# Patient Record
Sex: MALE | Race: BLACK OR AFRICAN AMERICAN | NOT HISPANIC OR LATINO | URBAN - METROPOLITAN AREA
[De-identification: names, ages, dates, MRNs, and addresses within clinical notes are randomized per-mention and may not be internally consistent; named-entity substitution may affect disease eponyms.]

---

## 2023-04-19 ENCOUNTER — NURSING HOME VISIT (OUTPATIENT)
Dept: POST ACUTE CARE | Facility: EXTERNAL LOCATION | Age: 63
End: 2023-04-19
Payer: MEDICAID

## 2023-04-19 DIAGNOSIS — F33.9 RECURRENT MAJOR DEPRESSIVE DISORDER, REMISSION STATUS UNSPECIFIED (CMS-HCC): ICD-10-CM

## 2023-04-19 DIAGNOSIS — E11.9 TYPE 2 DIABETES MELLITUS WITHOUT COMPLICATION, WITHOUT LONG-TERM CURRENT USE OF INSULIN (MULTI): Primary | ICD-10-CM

## 2023-04-19 DIAGNOSIS — R53.81 PHYSICAL DECONDITIONING: ICD-10-CM

## 2023-04-19 DIAGNOSIS — F20.0 CHRONIC PARANOID SCHIZOPHRENIA (MULTI): ICD-10-CM

## 2023-04-19 DIAGNOSIS — I10 PRIMARY HYPERTENSION: ICD-10-CM

## 2023-04-19 PROCEDURE — 99308 SBSQ NF CARE LOW MDM 20: CPT | Performed by: INTERNAL MEDICINE

## 2023-04-19 NOTE — LETTER
Patient: Carter Jefferson  : 1960    Encounter Date: 2023    Subjective- monthly follow up.   H/O schizophrenia, hypertension, hyperlipidemia, diabetes seen today for a routine visit .He is sitting in his bed and denies any complaints no concerns per staff.   ROS:   General-no fever   Chest-no pain   Respiratory-nocough   Abdomen-no pain or diarrhea   General: no acute distress   Pain:   Vital signs: /77   T 97.8   p 79 Weight 151.6   Gen- NAD, sitting in bed   Lungs: clear to auscultation B/L   Cardio: S1-S2 normal   ABD: Soft, nontender   Musc/Skel:No deformities   Extremities: No pedal edema   Neuro: No neurological deficits   Psych: Schizophrenia     Assessment/plan-   Hypertension- stable   c/w ASA   Hyperlipidemia-   Diabetes type 2-Hba1c 5.5   continue glyburide, Metformin   Schizophrenia-continue with fluphenazine   Follow-up with psych   Weakness- c/w supportive care      Electronically Signed By: Dianne Jewell MD   23 11:53 AM

## 2023-04-21 PROBLEM — E11.9 TYPE 2 DIABETES MELLITUS (MULTI): Status: ACTIVE | Noted: 2023-04-21

## 2023-04-21 PROBLEM — F20.0 CHRONIC PARANOID SCHIZOPHRENIA (MULTI): Status: ACTIVE | Noted: 2023-04-21

## 2023-04-21 PROBLEM — F32.A DEPRESSION: Status: ACTIVE | Noted: 2023-04-21

## 2023-04-21 PROBLEM — F32.9 MAJOR DEPRESSIVE DISORDER: Status: ACTIVE | Noted: 2023-04-21

## 2023-04-21 PROBLEM — R53.81 PHYSICAL DECONDITIONING: Status: ACTIVE | Noted: 2023-04-21

## 2023-04-21 PROBLEM — E11.40 DIABETIC NEUROPATHY (MULTI): Status: ACTIVE | Noted: 2023-04-21

## 2023-04-21 PROBLEM — I10 PRIMARY HYPERTENSION: Status: ACTIVE | Noted: 2023-04-21

## 2023-04-21 PROBLEM — F20.9 SCHIZOPHRENIA (MULTI): Status: ACTIVE | Noted: 2023-04-21

## 2023-04-21 PROBLEM — I25.10 CAD (CORONARY ARTERY DISEASE): Status: ACTIVE | Noted: 2023-04-21

## 2023-04-21 NOTE — PROGRESS NOTES
Subjective- monthly follow up.   H/O schizophrenia, hypertension, hyperlipidemia, diabetes seen today for a routine visit .He is sitting in his bed and denies any complaints no concerns per staff.   ROS:   General-no fever   Chest-no pain   Respiratory-nocough   Abdomen-no pain or diarrhea   General: no acute distress   Pain:   Vital signs: /77   T 97.8   p 79 Weight 151.6   Gen- NAD, sitting in bed   Lungs: clear to auscultation B/L   Cardio: S1-S2 normal   ABD: Soft, nontender   Musc/Skel:No deformities   Extremities: No pedal edema   Neuro: No neurological deficits   Psych: Schizophrenia     Assessment/plan-   Hypertension- stable   c/w ASA   Hyperlipidemia-   Diabetes type 2-Hba1c 5.5   continue glyburide, Metformin   Schizophrenia-continue with fluphenazine   Follow-up with psych   Weakness- c/w supportive care

## 2023-05-02 ENCOUNTER — NURSING HOME VISIT (OUTPATIENT)
Dept: POST ACUTE CARE | Facility: EXTERNAL LOCATION | Age: 63
End: 2023-05-02
Payer: MEDICAID

## 2023-05-02 DIAGNOSIS — M19.90 OSTEOARTHRITIS, UNSPECIFIED OSTEOARTHRITIS TYPE, UNSPECIFIED SITE: Primary | ICD-10-CM

## 2023-05-02 DIAGNOSIS — F20.0 CHRONIC PARANOID SCHIZOPHRENIA (MULTI): ICD-10-CM

## 2023-05-02 DIAGNOSIS — F29 PSYCHOSIS, UNSPECIFIED PSYCHOSIS TYPE (MULTI): ICD-10-CM

## 2023-05-02 DIAGNOSIS — E13.42 DIABETIC POLYNEUROPATHY ASSOCIATED WITH OTHER SPECIFIED DIABETES MELLITUS (MULTI): ICD-10-CM

## 2023-05-02 DIAGNOSIS — F33.9 RECURRENT MAJOR DEPRESSIVE DISORDER, REMISSION STATUS UNSPECIFIED (CMS-HCC): ICD-10-CM

## 2023-05-02 DIAGNOSIS — E11.9 TYPE 2 DIABETES MELLITUS WITHOUT COMPLICATION, WITHOUT LONG-TERM CURRENT USE OF INSULIN (MULTI): ICD-10-CM

## 2023-05-02 DIAGNOSIS — I25.10 CORONARY ARTERY DISEASE INVOLVING NATIVE HEART WITHOUT ANGINA PECTORIS, UNSPECIFIED VESSEL OR LESION TYPE: ICD-10-CM

## 2023-05-02 PROCEDURE — 99309 SBSQ NF CARE MODERATE MDM 30: CPT | Performed by: NURSE PRACTITIONER

## 2023-05-02 NOTE — LETTER
Patient: Carter Jefferson  : 1960    Encounter Date: 2023    Name: Carter Jefferson    YOB: 1960    Code Status: Full Code    Chief Complaint:   Chronic disease management;  osteoarthritis; etc....    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     Diagnoses as follows:      Osteoarthritis: Chronic. Stable.  On acetaminophen PRN for pain.   No complaints of pain today.  No reports of patient complaining of pain from staff.  Ambulates independently without any devices.  He does not walk around frequently.  No recent falls reported.   Patient seen today he is in bed.  Typically the patient is usually in bed.   Calm, cooperative.  Mentation at baseline.          Type 2 Diabetes; noncompliance with diagnostic testing:   On Metformin & Glyburide.  Prior HA1c results:  21 HA1c 5.6 %   10/12/21 HA1c 5.5 %   No recent HA1c available because patient consistently refuses blood work.  Discussed importance of allowing blood work to be done because it has been such a long time.  He has refused on multiple occasions, but he agrees to allow blood work to be done tomorrow morning.         CAD:  On Aspirin.   No complaints of chest pain.    Paranoid Schizophrenia;unspecified psychosis;Major Depressive Disorder:   On Fluphenazine.  He is compliant with taking his medications.  Cooperative but he is slightly irritable at  times.   He is calm and cooperative during assessment.  Mentation at baseline.  No agitation reported.   No recent weight loss reported.   Patient sleeps a lot.     Diabetic neuropathy:  Not on any medications currently for neuropathy.  No complaints of neuropathy symptoms today.              Reviewed  EMR  Reviewed medical, social, surgical and family history.  Reviewed all current medications and performed medication reconciliation.  Performed prescription drug management.  Reviewed vital signs AND lab results  Reviewed [facility] Documentation  Discussed patient with nursing.     ROS: 10 point ROS performed; Negative unless noted in HPI.      Surgical History  Problems    · No history of surgery    Family History  Mother    · No pertinent family history    Social History   · Current every day smoker   · Light cigarette smoker    · Lives in long-term care facility    · No illicit drug use   · Quit consuming alcohol in remote past    Allergies  NoKnown    · No Known Allergies      Outside Labs:  CBC: Date: 10/19/21, WBC: 11.6, Hgb: 12, Hct: 38.5, PLT: 310   CBC: Date: 10/12/21, WBC: 13.2, Hgb: 11.9, Hct: 38.2, PLT: 305   BMP: Date: 10/19/21, Na: 140, K: 4.2, Cl: 104, CO2: 26, BUN: 17, Cr: 1.0, Glu: 51, Ca: 9.7   BMP: Date: 10/12/21, Na: 140, K: 4.3, Cl: 103, CO2: 27, BUN: 17, Cr: 1.1, Glu: 80, Ca: 9.7   Hepatic Function Tests: Date: 10/12/21, AST: 12, ALT: 12, Alk Phos: 44, T Bili: 0.3, Albumin: 4.0   Hepatic Function Tests: Date: 10/19/21, AST: 12, ALT: 11, Alk Phos: 40, T Bili: 0.3, Albumin: 3.9   11/18/20 HA1 C 5.6 %    10/12/21 HA1c 5.5 %.          Lab Results   Component Value Date    WBC 17.2 (H) 11/11/2020    HGB 12.8 (L) 11/11/2020    HCT 41.9 11/11/2020     11/11/2020    ALT 12 11/11/2020    AST 14 11/11/2020     11/11/2020    K 3.7 11/11/2020     11/11/2020    CREATININE 0.96 11/11/2020    BUN 14 11/11/2020    CO2 27 11/11/2020    HGBA1C 5.6 11/11/2020             /90    "Pulse 75   Temp 36.4 °C (97.6 °F)   Resp 20   Ht 1.753 m (5' 9\")   Wt 66.1 kg (145 lb 12.8 oz)   SpO2 97%   BMI 21.53 kg/m²      Physical Exam  Vitals and nursing note reviewed.   Constitutional:       General: He is awake.      Appearance: He is underweight.      Comments: Chronically ill   HENT:      Head: Normocephalic.      Right Ear: External ear normal.      Left Ear: External ear normal.      Nose: Nose normal.      Mouth/Throat:      Mouth: Mucous membranes are moist.      Pharynx: Oropharynx is clear.   Eyes:      Extraocular Movements: Extraocular movements intact.      Conjunctiva/sclera: Conjunctivae normal.      Pupils: Pupils are equal, round, and reactive to light.   Cardiovascular:      Rate and Rhythm: Normal rate and regular rhythm.      Pulses: Normal pulses.      Heart sounds: Normal heart sounds.   Pulmonary:      Effort: Pulmonary effort is normal.      Breath sounds: Normal breath sounds.   Abdominal:      General: Bowel sounds are normal.      Palpations: Abdomen is soft.   Musculoskeletal:         General: Normal range of motion.      Cervical back: Normal range of motion and neck supple.   Skin:     General: Skin is warm and dry.   Neurological:      Mental Status: He is alert and oriented to person, place, and time. Mental status is at baseline.      Motor: Weakness present.   Psychiatric:         Mood and Affect: Mood normal. Affect is flat.         Behavior: Behavior is slowed. Behavior is not agitated or aggressive. Behavior is cooperative.         Judgment: Judgment is not inappropriate.          Assessment/Plan     Ordered CMP, CBC with diff., Ha1c, and lipid panel LABS for tomorrow.       Osteoarthritis:  Continue acetaminophen PRN for pain.   Monitor for pain.  Fall prevention strategies.         Type 2 Diabetes; noncompliance with diagnostic testing:   Continue Metformin & Glyburide.  Prior HA1c results:  11/18/21 HA1c 5.6 %   10/12/21 HA1c 5.5 %   No recent HA1c available " because patient consistently refuses blood work.  Discussed importance of allowing blood work to be done because it has been such a long time.  He has refused on multiple occasions, but he agrees to allow blood work to be done tomorrow morning.           CAD:  On Aspirin.   No complaints of chest pain.    Paranoid Schizophrenia;unspecified psychosis; Major Depressive Disorder:   Continue Fluphenazine.      Continue compliance with medications.  Monitor for agitation and behaviors.   Monitor for weight loss.    Diabetic neuropathy:  Not on any medications currently for neuropathy.  Monitor for signs and symptoms of neuropathy.      Problem List Items Addressed This Visit          Nervous    Diabetic neuropathy (CMS/HCC)       Circulatory    CAD (coronary artery disease)       Endocrine/Metabolic    Type 2 diabetes mellitus (CMS/HCC)       Other    Chronic paranoid schizophrenia (CMS/HCC)    Major depressive disorder     Other Visit Diagnoses       Osteoarthritis, unspecified osteoarthritis type, unspecified site    -  Primary    Psychosis, unspecified psychosis type (CMS/HCC)                CHON Valentine       Electronically Signed By: CHON Valentine   5/5/23 10:37 PM

## 2023-05-05 VITALS
TEMPERATURE: 97.6 F | WEIGHT: 145.8 LBS | BODY MASS INDEX: 21.59 KG/M2 | HEIGHT: 69 IN | OXYGEN SATURATION: 97 % | SYSTOLIC BLOOD PRESSURE: 135 MMHG | RESPIRATION RATE: 20 BRPM | DIASTOLIC BLOOD PRESSURE: 90 MMHG | HEART RATE: 75 BPM

## 2023-05-06 NOTE — PROGRESS NOTES
Name: Carter Jefferson    YOB: 1960    Code Status: Full Code    Chief Complaint:   Chronic disease management;  osteoarthritis; etc....    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     Diagnoses as follows:      Osteoarthritis: Chronic. Stable.  On acetaminophen PRN for pain.   No complaints of pain today.  No reports of patient complaining of pain from staff.  Ambulates independently without any devices.  He does not walk around frequently.  No recent falls reported.   Patient seen today he is in bed.  Typically the patient is usually in bed.   Calm, cooperative.  Mentation at baseline.          Type 2 Diabetes; noncompliance with diagnostic testing:   On Metformin & Glyburide.  Prior HA1c results:  11/18/21 HA1c 5.6 %   10/12/21 HA1c 5.5 %   No recent HA1c available because patient consistently refuses blood work.  Discussed importance of allowing blood work to be done because it has been such a long time.  He has refused on multiple occasions, but he agrees to allow blood work to be done tomorrow morning.         CAD:  On Aspirin.   No complaints of chest pain.    Paranoid Schizophrenia;unspecified psychosis;Major Depressive Disorder:   On Fluphenazine.  He is compliant with taking his medications.  Cooperative but he is slightly irritable at times.   He is calm and cooperative during assessment.  Mentation at  "baseline.  No agitation reported.   No recent weight loss reported.   Patient sleeps a lot.     Diabetic neuropathy:  Not on any medications currently for neuropathy.  No complaints of neuropathy symptoms today.              Reviewed  EMR  Reviewed medical, social, surgical and family history.  Reviewed all current medications and performed medication reconciliation.  Performed prescription drug management.  Reviewed vital signs AND lab results  Reviewed [facility] Documentation  Discussed patient with nursing.     ROS: 10 point ROS performed; Negative unless noted in HPI.      Surgical History  Problems    · No history of surgery    Family History  Mother    · No pertinent family history    Social History   · Current every day smoker   · Light cigarette smoker    · Lives in long-term care facility    · No illicit drug use   · Quit consuming alcohol in remote past    Allergies  NoKnown    · No Known Allergies      Outside Labs:  CBC: Date: 10/19/21, WBC: 11.6, Hgb: 12, Hct: 38.5, PLT: 310   CBC: Date: 10/12/21, WBC: 13.2, Hgb: 11.9, Hct: 38.2, PLT: 305   BMP: Date: 10/19/21, Na: 140, K: 4.2, Cl: 104, CO2: 26, BUN: 17, Cr: 1.0, Glu: 51, Ca: 9.7   BMP: Date: 10/12/21, Na: 140, K: 4.3, Cl: 103, CO2: 27, BUN: 17, Cr: 1.1, Glu: 80, Ca: 9.7   Hepatic Function Tests: Date: 10/12/21, AST: 12, ALT: 12, Alk Phos: 44, T Bili: 0.3, Albumin: 4.0   Hepatic Function Tests: Date: 10/19/21, AST: 12, ALT: 11, Alk Phos: 40, T Bili: 0.3, Albumin: 3.9   11/18/20 HA1 C 5.6 %    10/12/21 HA1c 5.5 %.          Lab Results   Component Value Date    WBC 17.2 (H) 11/11/2020    HGB 12.8 (L) 11/11/2020    HCT 41.9 11/11/2020     11/11/2020    ALT 12 11/11/2020    AST 14 11/11/2020     11/11/2020    K 3.7 11/11/2020     11/11/2020    CREATININE 0.96 11/11/2020    BUN 14 11/11/2020    CO2 27 11/11/2020    HGBA1C 5.6 11/11/2020             /90   Pulse 75   Temp 36.4 °C (97.6 °F)   Resp 20   Ht 1.753 m (5' 9\")  "  Wt 66.1 kg (145 lb 12.8 oz)   SpO2 97%   BMI 21.53 kg/m²      Physical Exam  Vitals and nursing note reviewed.   Constitutional:       General: He is awake.      Appearance: He is underweight.      Comments: Chronically ill   HENT:      Head: Normocephalic.      Right Ear: External ear normal.      Left Ear: External ear normal.      Nose: Nose normal.      Mouth/Throat:      Mouth: Mucous membranes are moist.      Pharynx: Oropharynx is clear.   Eyes:      Extraocular Movements: Extraocular movements intact.      Conjunctiva/sclera: Conjunctivae normal.      Pupils: Pupils are equal, round, and reactive to light.   Cardiovascular:      Rate and Rhythm: Normal rate and regular rhythm.      Pulses: Normal pulses.      Heart sounds: Normal heart sounds.   Pulmonary:      Effort: Pulmonary effort is normal.      Breath sounds: Normal breath sounds.   Abdominal:      General: Bowel sounds are normal.      Palpations: Abdomen is soft.   Musculoskeletal:         General: Normal range of motion.      Cervical back: Normal range of motion and neck supple.   Skin:     General: Skin is warm and dry.   Neurological:      Mental Status: He is alert and oriented to person, place, and time. Mental status is at baseline.      Motor: Weakness present.   Psychiatric:         Mood and Affect: Mood normal. Affect is flat.         Behavior: Behavior is slowed. Behavior is not agitated or aggressive. Behavior is cooperative.         Judgment: Judgment is not inappropriate.          Assessment/Plan      Ordered CMP, CBC with diff., Ha1c, and lipid panel LABS for tomorrow.       Osteoarthritis:  Continue acetaminophen PRN for pain.   Monitor for pain.  Fall prevention strategies.         Type 2 Diabetes; noncompliance with diagnostic testing:   Continue Metformin & Glyburide.  Prior HA1c results:  11/18/21 HA1c 5.6 %   10/12/21 HA1c 5.5 %   No recent HA1c available because patient consistently refuses blood work.  Discussed importance  of allowing blood work to be done because it has been such a long time.  He has refused on multiple occasions, but he agrees to allow blood work to be done tomorrow morning.           CAD:  On Aspirin.   No complaints of chest pain.    Paranoid Schizophrenia;unspecified psychosis; Major Depressive Disorder:   Continue Fluphenazine.      Continue compliance with medications.  Monitor for agitation and behaviors.   Monitor for weight loss.    Diabetic neuropathy:  Not on any medications currently for neuropathy.  Monitor for signs and symptoms of neuropathy.      Problem List Items Addressed This Visit          Nervous    Diabetic neuropathy (CMS/HCC)       Circulatory    CAD (coronary artery disease)       Endocrine/Metabolic    Type 2 diabetes mellitus (CMS/HCC)       Other    Chronic paranoid schizophrenia (CMS/HCC)    Major depressive disorder     Other Visit Diagnoses       Osteoarthritis, unspecified osteoarthritis type, unspecified site    -  Primary    Psychosis, unspecified psychosis type (CMS/HCC)                Bethany Jones, APRN-CNP

## 2023-06-06 ENCOUNTER — NURSING HOME VISIT (OUTPATIENT)
Dept: POST ACUTE CARE | Facility: EXTERNAL LOCATION | Age: 63
End: 2023-06-06
Payer: MEDICAID

## 2023-06-06 DIAGNOSIS — M19.90 OSTEOARTHRITIS, UNSPECIFIED OSTEOARTHRITIS TYPE, UNSPECIFIED SITE: ICD-10-CM

## 2023-06-06 DIAGNOSIS — M62.81 GENERALIZED MUSCLE WEAKNESS: ICD-10-CM

## 2023-06-06 DIAGNOSIS — I25.10 CORONARY ARTERY DISEASE INVOLVING NATIVE HEART WITHOUT ANGINA PECTORIS, UNSPECIFIED VESSEL OR LESION TYPE: ICD-10-CM

## 2023-06-06 DIAGNOSIS — F20.0 CHRONIC PARANOID SCHIZOPHRENIA (MULTI): ICD-10-CM

## 2023-06-06 DIAGNOSIS — R63.4 WEIGHT LOSS: ICD-10-CM

## 2023-06-06 DIAGNOSIS — E11.9 TYPE 2 DIABETES MELLITUS WITHOUT COMPLICATION, WITHOUT LONG-TERM CURRENT USE OF INSULIN (MULTI): Primary | ICD-10-CM

## 2023-06-06 DIAGNOSIS — F33.9 RECURRENT MAJOR DEPRESSIVE DISORDER, REMISSION STATUS UNSPECIFIED (CMS-HCC): ICD-10-CM

## 2023-06-06 DIAGNOSIS — Z91.199 NONCOMPLIANCE WITH DIAGNOSTIC TESTING: ICD-10-CM

## 2023-06-06 DIAGNOSIS — E13.42 DIABETIC POLYNEUROPATHY ASSOCIATED WITH OTHER SPECIFIED DIABETES MELLITUS (MULTI): ICD-10-CM

## 2023-06-06 PROCEDURE — 99309 SBSQ NF CARE MODERATE MDM 30: CPT | Performed by: NURSE PRACTITIONER

## 2023-06-06 NOTE — LETTER
Patient: Carter Jefferson  : 1960    Encounter Date: 2023    Name: Carter Jefferson    YOB: 1960    Code Status: Full Code    Chief Complaint:   Follow up on DM 2; etc....    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     Diagnoses as follows:    Type 2 Diabetes:   On Metformin 1000 mg PO BID & Glyburide. 2.5 mg PO Q AM.   HA1c results:  21 HA1c 5.6 %   10/12/21 HA1c 5.5 %   5/3/2023: HA1c 5%      Osteoarthritis:   On acetaminophen PRN for pain.   No complaints of pain today.  No reports of patient complaining of pain from staff.  Ambulates independently without any devices.  He does not walk around frequently.  No recent falls reported.   Patient seen today he is in bed.  Typically the patient is usually in bed.   Calm, cooperative.  Mentation at baseline.    Major Depressive Disorder; Paranoid Schizophrenia;unspecified psychosis;:   On Fluphenazine.  He is compliant with taking his medications.  Cooperative but sometimes irritable.  He is calm and cooperative during assessment.  Mentation at baseline.  No agitation reported.   No recent weight loss reported.   Patient sleeps a lot.     Weight loss:  Recent weight trends:  22 161 #  23 157.2 #  23 159.2 #  3/3/23 158.6 #  23 145.8 #  5/3/23 145.8 #  23 145.2 #  Currently on regular diet, regular texture, thin consistency.      CAD:  On Aspirin.   No complaints of chest pain.    Diabetic neuropathy:  Not on any medications currently for neuropathy.  No complaints of neuropathy symptoms today.     Noncompliance with diagnostic testing:  Patient has been noncompliant with diagnostic testing.  He always refused blood work.  Have discussed the importance of bloodwork with the patient.  Patient finally allowed blood work to be done on 5/3/23.    Generalized muscle weakness:  Patient is able to ambulate but usually is always in bed.  Does not ambulate very much.  No recent falls reported.          Reviewed  EMR  Reviewed medical, social, surgical and family history.  Reviewed all current medications and performed medication reconciliation.  Performed prescription drug management.  Reviewed vital signs AND lab results  Reviewed [facility] Documentation  Discussed patient with nursing.     ROS: 10 point ROS performed; Negative unless noted in HPI.      Surgical History  Problems    · No history of surgery    Family History  Mother    · No pertinent family history    Social History   · Current every day smoker   · Light cigarette smoker    · Lives in long-term care facility    · No illicit drug use   · Quit consuming alcohol in remote past    Allergies  NoKnown    · No Known Allergies      Outside Labs:  CBC: Date: 10/19/21, WBC: 11.6, Hgb: 12, Hct: 38.5, PLT: 310   CBC: Date: 10/12/21, WBC: 13.2, Hgb: 11.9, Hct: 38.2, PLT: 305   BMP: Date: 10/19/21, Na: 140, K: 4.2, Cl: 104, CO2: 26, BUN: 17, Cr: 1.0, Glu: 51, Ca: 9.7   BMP: Date: 10/12/21, Na: 140, K: 4.3, Cl: 103, CO2: 27, BUN: 17, Cr: 1.1, Glu: 80, Ca: 9.7   Hepatic Function Tests: Date: 10/12/21, AST: 12, ALT: 12, Alk Phos: 44, T Bili: 0.3, Albumin: 4.0   Hepatic Function Tests: Date: 10/19/21, AST: 12, ALT: 11, Alk Phos: 40, T Bili: 0.3, Albumin: 3.9   11/18/20 HA1 C 5.6 %    10/12/21 HA1c 5.5 %.     RECENT LABS:  BMP: Date: 5/3/2023 Na 141 K 4.2 Cr 0.9 BUN 17 glucose 49 Ca 9.6 GFR  "103  CBC: Date: 5/3/2023 WBC 13.3 Hgb 11.3 hematocrit 36.5 platelets 323  Liver function: Date: 5/3/2023 ALT 7 AST 11 alkaline phos.  47 bilirubin 0.4 albumin 3.8 protein 8.2  Date: 5/3/2023: Lipid panel: Cholesterol 137; triglycerides 65; HDL 31; LDL 93.  Date 5/3/2023: HA1c 5%      Lab Results   Component Value Date    WBC 17.2 (H) 11/11/2020    HGB 12.8 (L) 11/11/2020    HCT 41.9 11/11/2020     11/11/2020    ALT 12 11/11/2020    AST 14 11/11/2020     11/11/2020    K 3.7 11/11/2020     11/11/2020    CREATININE 0.96 11/11/2020    BUN 14 11/11/2020    CO2 27 11/11/2020    HGBA1C 5.6 11/11/2020             /70   Pulse 66   Temp 36.1 °C (97 °F)   Resp 18   Ht 1.753 m (5' 9\")   Wt 65.9 kg (145 lb 3.2 oz)   SpO2 99%   BMI 21.44 kg/m²      Physical Exam  Vitals and nursing note reviewed.   Constitutional:       General: He is awake.      Appearance: He is underweight.      Comments: Chronically ill   HENT:      Head: Normocephalic.      Right Ear: External ear normal.      Left Ear: External ear normal.      Nose: Nose normal.      Mouth/Throat:      Mouth: Mucous membranes are moist.      Pharynx: Oropharynx is clear.   Eyes:      Extraocular Movements: Extraocular movements intact.      Conjunctiva/sclera: Conjunctivae normal.      Pupils: Pupils are equal, round, and reactive to light.   Cardiovascular:      Rate and Rhythm: Normal rate and regular rhythm.      Pulses: Normal pulses.      Heart sounds: Normal heart sounds.   Pulmonary:      Effort: Pulmonary effort is normal.      Breath sounds: Normal breath sounds.   Abdominal:      General: Bowel sounds are normal.      Palpations: Abdomen is soft.   Musculoskeletal:         General: Normal range of motion.      Cervical back: Normal range of motion and neck supple.   Skin:     General: Skin is warm and dry.   Neurological:      Mental Status: He is alert and oriented to person, place, and time. Mental status is at baseline.      " Motor: Weakness present.   Psychiatric:         Mood and Affect: Mood normal. Affect is flat.         Behavior: Behavior is slowed. Behavior is not agitated or aggressive. Behavior is cooperative.         Judgment: Judgment is not inappropriate.          Assessment/Plan     Type 2 Diabetes:   Discontinue Metformin 1000 mg PO BID.  Start Metformin 500 mg PO BID.   Continue Glyburide. 2.5 mg PO Q AM.   Monitor Ha1c Q 3 months.  ###Start to monitor accuchecks Q AM ROUTINELY for 2 weeks.   If accuchecks WNL will reduce/discontinue some medications.         Osteoarthritis:   Continue acetaminophen PRN for pain.       Monitor for pain.      Fall prevention strategies.    Major Depressive Disorder; Paranoid Schizophrenia;unspecified psychosis;:   Continue Fluphenazine.  Monitor for agitation.  Monitor for behaviors.     Weight loss:  Monitor weights.  Continue regular diet, regular texture, thin consistency.  Dietary consult to start supplements.   In house dietician to also monitor weights.      CAD:       Continue aspirin.     Diabetic neuropathy:  Not on any medications currently for neuropathy.      Monitor for neuropathy symptoms.    Noncompliance with diagnostic testing:  Patient has been noncompliant with diagnostic testing.  He always refused blood work.  Have discussed the importance of bloodwork with the patient.  Patient finally allowed blood work to be done on 5/3/23.    Generalized muscle weakness:      Fall prevention strategies.         Problem List Items Addressed This Visit          Nervous    Diabetic neuropathy (CMS/HCC)       Circulatory    CAD (coronary artery disease)       Endocrine/Metabolic    Type 2 diabetes mellitus (CMS/HCC) - Primary       Other    Chronic paranoid schizophrenia (CMS/HCC)    Major depressive disorder     Other Visit Diagnoses       Osteoarthritis, unspecified osteoarthritis type, unspecified site        Weight loss        Noncompliance with diagnostic testing        Generalized  muscle weakness              CHON Valentine       Electronically Signed By: CHON Valentine   6/9/23  8:55 PM

## 2023-06-09 VITALS
HEIGHT: 69 IN | HEART RATE: 66 BPM | RESPIRATION RATE: 18 BRPM | DIASTOLIC BLOOD PRESSURE: 70 MMHG | TEMPERATURE: 97 F | BODY MASS INDEX: 21.51 KG/M2 | OXYGEN SATURATION: 99 % | SYSTOLIC BLOOD PRESSURE: 122 MMHG | WEIGHT: 145.2 LBS

## 2023-06-10 NOTE — PROGRESS NOTES
Name: Carter Jefferson    YOB: 1960    Code Status: Full Code    Chief Complaint:   Follow up on DM 2; etc....    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     Diagnoses as follows:    Type 2 Diabetes:   On Metformin 1000 mg PO BID & Glyburide. 2.5 mg PO Q AM.   HA1c results:  11/18/21 HA1c 5.6 %   10/12/21 HA1c 5.5 %   5/3/2023: HA1c 5%      Osteoarthritis:   On acetaminophen PRN for pain.   No complaints of pain today.  No reports of patient complaining of pain from staff.  Ambulates independently without any devices.  He does not walk around frequently.  No recent falls reported.   Patient seen today he is in bed.  Typically the patient is usually in bed.   Calm, cooperative.  Mentation at baseline.    Major Depressive Disorder; Paranoid Schizophrenia;unspecified psychosis;:   On Fluphenazine.  He is compliant with taking his medications.  Cooperative but sometimes irritable.  He is calm and cooperative during assessment.  Mentation at baseline.  No agitation reported.   No recent weight loss reported.   Patient sleeps a lot.     Weight loss:  Recent weight trends:  12/5/22 161 #  1/5/23 157.2 #  2/1/23 159.2 #  3/3/23 158.6 #  4/2/23 145.8 #  5/3/23 145.8 #  6/2/23 145.2 #  Currently on regular diet, regular texture, thin consistency.     CAD:  On Aspirin.   No complaints of chest pain.    Diabetic neuropathy:  Not on  Pt called inquiring about script from urology office that was supposed to be sent on 8/19  Informed pt of the recommendations provided by Norma De and Ketty Calvo RN yesterday  Informed there was no mention of script for pt  Reiterated recommendations and upcoming appt  Instructed to call with worsening s/s  Pt verbalized understanding  any medications currently for neuropathy.  No complaints of neuropathy symptoms today.     Noncompliance with diagnostic testing:  Patient has been noncompliant with diagnostic testing.  He always refused blood work.  Have discussed the importance of bloodwork with the patient.  Patient finally allowed blood work to be done on 5/3/23.    Generalized muscle weakness:  Patient is able to ambulate but usually is always in bed.  Does not ambulate very much.  No recent falls reported.          Reviewed  EMR  Reviewed medical, social, surgical and family history.  Reviewed all current medications and performed medication reconciliation.  Performed prescription drug management.  Reviewed vital signs AND lab results  Reviewed [facility] Documentation  Discussed patient with nursing.     ROS: 10 point ROS performed; Negative unless noted in HPI.      Surgical History  Problems    · No history of surgery    Family History  Mother    · No pertinent family history    Social History   · Current every day smoker   · Light cigarette smoker    · Lives in long-term care facility    · No illicit drug use   · Quit consuming alcohol in remote past    Allergies  NoKnown    · No Known Allergies      Outside Labs:  CBC: Date: 10/19/21, WBC: 11.6, Hgb: 12, Hct: 38.5, PLT: 310   CBC: Date: 10/12/21, WBC: 13.2, Hgb: 11.9, Hct: 38.2, PLT: 305   BMP: Date: 10/19/21, Na: 140, K: 4.2, Cl: 104, CO2: 26, BUN: 17, Cr: 1.0, Glu: 51, Ca: 9.7   BMP: Date: 10/12/21, Na: 140, K: 4.3, Cl: 103, CO2: 27, BUN: 17, Cr: 1.1, Glu: 80, Ca: 9.7   Hepatic Function Tests: Date: 10/12/21, AST: 12, ALT: 12, Alk Phos: 44, T Bili: 0.3, Albumin: 4.0   Hepatic Function Tests: Date: 10/19/21, AST: 12, ALT: 11, Alk Phos: 40, T Bili: 0.3, Albumin: 3.9   11/18/20 HA1 C 5.6 %    10/12/21 HA1c 5.5 %.     RECENT LABS:  BMP: Date: 5/3/2023 Na 141 K 4.2 Cr 0.9 BUN 17 glucose 49 Ca 9.6   CBC: Date: 5/3/2023 WBC 13.3 Hgb 11.3 hematocrit 36.5 platelets 323  Liver  "function: Date: 5/3/2023 ALT 7 AST 11 alkaline phos.  47 bilirubin 0.4 albumin 3.8 protein 8.2  Date: 5/3/2023: Lipid panel: Cholesterol 137; triglycerides 65; HDL 31; LDL 93.  Date 5/3/2023: HA1c 5%      Lab Results   Component Value Date    WBC 17.2 (H) 11/11/2020    HGB 12.8 (L) 11/11/2020    HCT 41.9 11/11/2020     11/11/2020    ALT 12 11/11/2020    AST 14 11/11/2020     11/11/2020    K 3.7 11/11/2020     11/11/2020    CREATININE 0.96 11/11/2020    BUN 14 11/11/2020    CO2 27 11/11/2020    HGBA1C 5.6 11/11/2020             /70   Pulse 66   Temp 36.1 °C (97 °F)   Resp 18   Ht 1.753 m (5' 9\")   Wt 65.9 kg (145 lb 3.2 oz)   SpO2 99%   BMI 21.44 kg/m²      Physical Exam  Vitals and nursing note reviewed.   Constitutional:       General: He is awake.      Appearance: He is underweight.      Comments: Chronically ill   HENT:      Head: Normocephalic.      Right Ear: External ear normal.      Left Ear: External ear normal.      Nose: Nose normal.      Mouth/Throat:      Mouth: Mucous membranes are moist.      Pharynx: Oropharynx is clear.   Eyes:      Extraocular Movements: Extraocular movements intact.      Conjunctiva/sclera: Conjunctivae normal.      Pupils: Pupils are equal, round, and reactive to light.   Cardiovascular:      Rate and Rhythm: Normal rate and regular rhythm.      Pulses: Normal pulses.      Heart sounds: Normal heart sounds.   Pulmonary:      Effort: Pulmonary effort is normal.      Breath sounds: Normal breath sounds.   Abdominal:      General: Bowel sounds are normal.      Palpations: Abdomen is soft.   Musculoskeletal:         General: Normal range of motion.      Cervical back: Normal range of motion and neck supple.   Skin:     General: Skin is warm and dry.   Neurological:      Mental Status: He is alert and oriented to person, place, and time. Mental status is at baseline.      Motor: Weakness present.   Psychiatric:         Mood and Affect: Mood normal. " Affect is flat.         Behavior: Behavior is slowed. Behavior is not agitated or aggressive. Behavior is cooperative.         Judgment: Judgment is not inappropriate.          Assessment/Plan      Type 2 Diabetes:   Discontinue Metformin 1000 mg PO BID.  Start Metformin 500 mg PO BID.   Continue Glyburide. 2.5 mg PO Q AM.   Monitor Ha1c Q 3 months.  ###Start to monitor accuchecks Q AM ROUTINELY for 2 weeks.   If accuchecks WNL will reduce/discontinue some medications.         Osteoarthritis:   Continue acetaminophen PRN for pain.       Monitor for pain.      Fall prevention strategies.    Major Depressive Disorder; Paranoid Schizophrenia;unspecified psychosis;:   Continue Fluphenazine.  Monitor for agitation.  Monitor for behaviors.     Weight loss:  Monitor weights.  Continue regular diet, regular texture, thin consistency.  Dietary consult to start supplements.   In house dietician to also monitor weights.      CAD:       Continue aspirin.     Diabetic neuropathy:  Not on any medications currently for neuropathy.      Monitor for neuropathy symptoms.    Noncompliance with diagnostic testing:  Patient has been noncompliant with diagnostic testing.  He always refused blood work.  Have discussed the importance of bloodwork with the patient.  Patient finally allowed blood work to be done on 5/3/23.    Generalized muscle weakness:      Fall prevention strategies.         Problem List Items Addressed This Visit          Nervous    Diabetic neuropathy (CMS/HCC)       Circulatory    CAD (coronary artery disease)       Endocrine/Metabolic    Type 2 diabetes mellitus (CMS/HCC) - Primary       Other    Chronic paranoid schizophrenia (CMS/HCC)    Major depressive disorder     Other Visit Diagnoses       Osteoarthritis, unspecified osteoarthritis type, unspecified site        Weight loss        Noncompliance with diagnostic testing        Generalized muscle weakness              Bethany Jones, APRN-CNP

## 2023-06-22 ENCOUNTER — NURSING HOME VISIT (OUTPATIENT)
Dept: POST ACUTE CARE | Facility: EXTERNAL LOCATION | Age: 63
End: 2023-06-22
Payer: MEDICAID

## 2023-06-22 DIAGNOSIS — M62.81 GENERALIZED MUSCLE WEAKNESS: ICD-10-CM

## 2023-06-22 DIAGNOSIS — I25.10 CORONARY ARTERY DISEASE INVOLVING NATIVE HEART WITHOUT ANGINA PECTORIS, UNSPECIFIED VESSEL OR LESION TYPE: ICD-10-CM

## 2023-06-22 DIAGNOSIS — F33.9 RECURRENT MAJOR DEPRESSIVE DISORDER, REMISSION STATUS UNSPECIFIED (CMS-HCC): ICD-10-CM

## 2023-06-22 DIAGNOSIS — F20.0 CHRONIC PARANOID SCHIZOPHRENIA (MULTI): ICD-10-CM
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
## 2023-06-22 DIAGNOSIS — S09.90XA INJURY OF HEAD, INITIAL ENCOUNTER: Primary | ICD-10-CM

## 2023-06-22 DIAGNOSIS — R63.4 WEIGHT LOSS: ICD-10-CM

## 2023-06-22 DIAGNOSIS — E11.9 TYPE 2 DIABETES MELLITUS WITHOUT COMPLICATION, WITHOUT LONG-TERM CURRENT USE OF INSULIN (MULTI): ICD-10-CM

## 2023-06-22 DIAGNOSIS — M19.90 OSTEOARTHRITIS, UNSPECIFIED OSTEOARTHRITIS TYPE, UNSPECIFIED SITE: ICD-10-CM

## 2023-06-22 PROCEDURE — 99309 SBSQ NF CARE MODERATE MDM 30: CPT | Performed by: NURSE PRACTITIONER

## 2023-06-22 NOTE — LETTER
Patient: Carter Jefferson  : 1960    Encounter Date: 2023    Name: Carter Jefferson    YOB: 1960    Code Status: Full Code    Chief Complaint:   Head injury; s/p fall; etc....    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     Diagnoses as follows:    Head injury; s/p fall:  Nursing and patient reports that patient fell and hit his head earlier today.  He was at the vending machine and bent over because he dropped a quarter and lost his balance and fell backwards hitting his head on the floor.  The fall was unwitnessed. His head is not bleeding. He did not lose consciousness. He did not report feeling dizziness prior to falling.   He states his head is sore where he bumped it.   He is not complaining of any other injury/pain related to the fall.   His mentation is at baseline.   He is sitting up in a wheelchair today.  He is alert, calm, and cooperative.   No complaints of dizziness.  No chest pain.      Osteoarthritis:   On acetaminophen PRN for pain.   No complaints of pain today.  No reports of patient complaining of pain from staff.  Ambulates independently without any devices.  He does not walk around frequently.    Major Depressive Disorder; Paranoid Schizophrenia;unspecified psychosis;:   On Fluphenazine.  He is compliant with taking his medications.  Cooperative but sometimes irritable.  Mentation at  baseline.  No agitation reported.   No recent weight loss reported.   Patient sleeps a lot.     Type 2 Diabetes:   On Metformin 500 mg PO BID & Glyburide. 2.5 mg PO Q AM.   HA1c results:  11/18/21 HA1c 5.6 %   10/12/21 HA1c 5.5 %   5/3/2023: HA1c 5%          Weight loss:  Recent weight trends:  12/5/22 161 #  1/5/23 157.2 #  2/1/23 159.2 #  3/3/23 158.6 #  4/2/23 145.8 #  5/3/23 145.8 #  6/2/23 145.2 #  Currently on regular diet, regular texture, thin consistency.  Weights are trending down.      CAD:  On Aspirin.   No complaints of chest pain.      Generalized muscle weakness:  Patient is able to ambulate but usually is always in bed.  Does not ambulate very much.  No recent falls reported.          Reviewed  EMR  Reviewed medical, social, surgical and family history.  Reviewed all current medications and performed medication reconciliation.  Performed prescription drug management.  Reviewed vital signs AND lab results  Reviewed [facility] Documentation  Discussed patient with nursing.     ROS: 10 point ROS performed; Negative unless noted in HPI.      Surgical History  Problems    · No history of surgery    Family History  Mother    · No pertinent family history    Social History   · Current every day smoker   · Light cigarette smoker    · Lives in long-term care facility    · No illicit drug use   · Quit consuming alcohol in remote past    Allergies  NoKnown    · No Known Allergies      Outside Labs:  CBC: Date: 10/19/21, WBC: 11.6, Hgb: 12, Hct: 38.5, PLT: 310   CBC: Date: 10/12/21, WBC: 13.2, Hgb: 11.9, Hct: 38.2, PLT: 305   BMP: Date: 10/19/21, Na: 140, K: 4.2, Cl: 104, CO2: 26, BUN: 17, Cr: 1.0, Glu: 51, Ca: 9.7   BMP: Date: 10/12/21, Na: 140, K: 4.3, Cl: 103, CO2: 27, BUN: 17, Cr: 1.1, Glu: 80, Ca: 9.7   Hepatic Function Tests: Date: 10/12/21, AST: 12, ALT: 12, Alk Phos: 44, T Bili: 0.3, Albumin: 4.0   Hepatic Function Tests: Date: 10/19/21, AST: 12, ALT: 11, Alk Phos: 40, T Bili: 0.3, Albumin: 3.9  "  11/18/20 HA1 C 5.6 %    10/12/21 HA1c 5.5 %.     RECENT LABS:  BMP: Date: 5/3/2023 Na 141 K 4.2 Cr 0.9 BUN 17 glucose 49 Ca 9.6   CBC: Date: 5/3/2023 WBC 13.3 Hgb 11.3 hematocrit 36.5 platelets 323  Liver function: Date: 5/3/2023 ALT 7 AST 11 alkaline phos.  47 bilirubin 0.4 albumin 3.8 protein 8.2  Date: 5/3/2023: Lipid panel: Cholesterol 137; triglycerides 65; HDL 31; LDL 93.  Date 5/3/2023: HA1c 5%      Lab Results   Component Value Date    WBC 17.2 (H) 11/11/2020    HGB 12.8 (L) 11/11/2020    HCT 41.9 11/11/2020     11/11/2020    ALT 12 11/11/2020    AST 14 11/11/2020     11/11/2020    K 3.7 11/11/2020     11/11/2020    CREATININE 0.96 11/11/2020    BUN 14 11/11/2020    CO2 27 11/11/2020    HGBA1C 5.6 11/11/2020             /70   Pulse 66   Temp 36.1 °C (97 °F)   Resp 18   Ht 1.753 m (5' 9\")   Wt 65.9 kg (145 lb 3.2 oz)   SpO2 99%   BMI 21.44 kg/m²      Physical Exam  Vitals and nursing note reviewed.   Constitutional:       General: He is awake.      Appearance: He is underweight.      Comments: Chronically ill   HENT:      Head: Normocephalic.      Right Ear: External ear normal.      Left Ear: External ear normal.      Nose: Nose normal.      Mouth/Throat:      Mouth: Mucous membranes are moist.      Pharynx: Oropharynx is clear.   Eyes:      Extraocular Movements: Extraocular movements intact.      Conjunctiva/sclera: Conjunctivae normal.      Pupils: Pupils are equal, round, and reactive to light.   Cardiovascular:      Rate and Rhythm: Normal rate and regular rhythm.      Pulses: Normal pulses.      Heart sounds: Normal heart sounds.   Pulmonary:      Effort: Pulmonary effort is normal.      Breath sounds: Normal breath sounds.   Abdominal:      General: Bowel sounds are normal.      Palpations: Abdomen is soft.   Musculoskeletal:         General: Normal range of motion.      Cervical back: Normal range of motion and neck supple.   Skin:     General: Skin is warm " and dry.   Neurological:      Mental Status: He is alert and oriented to person, place, and time. Mental status is at baseline.      Motor: Weakness present.   Psychiatric:         Mood and Affect: Mood normal. Affect is flat.         Behavior: Behavior is slowed. Behavior is not agitated or aggressive. Behavior is cooperative.         Judgment: Judgment is not inappropriate.          Assessment/Plan     Head injury; s/p fall:  Nursing and patient reports that patient fell and hit his head earlier today.  He was at the vending machine and bent over because he dropped a quarter and lost his balance and fell backwards hitting his head on the floor.  The fall was unwitnessed. His head is not bleeding. He did not lose consciousness. He did not report feeling dizziness prior to falling.   He states his head is sore where he bumped it.       Ordered nursing to send patient to [facility] ER for evaluation and CT of the head related to fall.       Nursing to call report to [facility] ER nurse explaining accident.       Nursing to schedule transportation to [facility] ER-pt. Does not need to go 911.    Osteoarthritis:   Continue acetaminophen PRN for pain.        Fall prevention strategies.       PT/OT eval/treat 2/2 to fall.    Paranoid Schizophrenia;unspecified psychosis; Major Depressive Disorder :   Continue Fluphenazine.    Type 2 Diabetes:   Continue Metformin 500 mg PO BID & Glyburide. 2.5 mg PO Q AM.  Monitor accuchecks QAM for 1 week.     Weight loss:  Monitor weights.  Continue regular diet, regular texture, thin consistency.     CAD:  Continue Aspirin.   Monitor for chest pain.    Generalized muscle weakness:  Fall prevention strategies.  PT/OT eval. Since recent fall.           Problem List Items Addressed This Visit          Circulatory    CAD (coronary artery disease)       Endocrine/Metabolic    Type 2 diabetes mellitus (CMS/HCC)       Other    Chronic paranoid schizophrenia (CMS/HCC)    Major depressive disorder      Other Visit Diagnoses       Injury of head, initial encounter    -  Primary    Accidental fall, initial encounter        Osteoarthritis, unspecified osteoarthritis type, unspecified site        Weight loss        Generalized muscle weakness            CHON Valentine       Electronically Signed By: CHON Valentine   6/24/23  3:37 PM

## 2023-06-23 ENCOUNTER — NURSING HOME VISIT (OUTPATIENT)
Dept: POST ACUTE CARE | Facility: EXTERNAL LOCATION | Age: 63
End: 2023-06-23
Payer: MEDICAID

## 2023-06-23 DIAGNOSIS — E11.00 TYPE 2 DIABETES MELLITUS WITH HYPEROSMOLARITY WITHOUT COMA, WITHOUT LONG-TERM CURRENT USE OF INSULIN (MULTI): ICD-10-CM

## 2023-06-23 DIAGNOSIS — I10 PRIMARY HYPERTENSION: ICD-10-CM

## 2023-06-23 DIAGNOSIS — F32.0 CURRENT MILD EPISODE OF MAJOR DEPRESSIVE DISORDER WITHOUT PRIOR EPISODE (CMS-HCC): Primary | ICD-10-CM

## 2023-06-23 PROCEDURE — 99308 SBSQ NF CARE LOW MDM 20: CPT | Performed by: INTERNAL MEDICINE

## 2023-06-23 NOTE — LETTER
Patient: Carter Jefferson  : 1960    Encounter Date: 2023     Subjective- monthly follow up.  H/O schizophrenia, hypertension, hyperlipidemia, diabetes seen today for a routine visit .He is sitting in his bed and denies any complaints no concerns per staff.  ROS:  General-no fever  Chest-no pain  Respiratory-nocough  Abdomen-no pain or diarrhea  General: no acute distress  Pain:  Vital signs: /70  T 97  79 Weight 145.2 lbs  Gen- NAD, sitting in bed  Lungs: clear to auscultation B/L  Cardio: S1-S2 normal  ABD: Soft, nontender  Musc/Skel:No deformities  Extremities: No pedal edema  Neuro: No neurological deficits  Psych: Schizophrenia  Assessment/plan-  Hypertension- stable  c/w ASA  Hyperlipidemia-  Diabetes type 2-Hba1c 5.0  D/C glyburide, c/w Metformin  Schizophrenia-continue with fluphenazine  Follow-up with psych  Weakness- c/w supportive care      Electronically Signed By: Dianne Jewell MD   23  8:37 PM

## 2023-06-24 VITALS
BODY MASS INDEX: 21.51 KG/M2 | DIASTOLIC BLOOD PRESSURE: 70 MMHG | HEART RATE: 66 BPM | HEIGHT: 69 IN | RESPIRATION RATE: 18 BRPM | TEMPERATURE: 97 F | OXYGEN SATURATION: 99 % | SYSTOLIC BLOOD PRESSURE: 122 MMHG | WEIGHT: 145.2 LBS

## 2023-06-24 NOTE — PROGRESS NOTES
Name: Carter Jefferson    YOB: 1960    Code Status: Full Code    Chief Complaint:   Head injury; s/p fall; etc....    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     Diagnoses as follows:    Head injury; s/p fall:  Nursing and patient reports that patient fell and hit his head earlier today.  He was at the vending machine and bent over because he dropped a quarter and lost his balance and fell backwards hitting his head on the floor.  The fall was unwitnessed. His head is not bleeding. He did not lose consciousness. He did not report feeling dizziness prior to falling.   He states his head is sore where he bumped it.   He is not complaining of any other injury/pain related to the fall.   His mentation is at baseline.   He is sitting up in a wheelchair today.  He is alert, calm, and cooperative.   No complaints of dizziness.  No chest pain.      Osteoarthritis:   On acetaminophen PRN for pain.   No complaints of pain today.  No reports of patient complaining of pain from staff.  Ambulates independently without any devices.  He does not walk around frequently.    Major Depressive Disorder; Paranoid Schizophrenia;unspecified psychosis;:   On Fluphenazine.  He is compliant with taking his medications.  Cooperative but sometimes irritable.  Mentation at baseline.  No agitation reported.   No recent weight loss reported.    Patient sleeps a lot.     Type 2 Diabetes:   On Metformin 500 mg PO BID & Glyburide. 2.5 mg PO Q AM.   HA1c results:  11/18/21 HA1c 5.6 %   10/12/21 HA1c 5.5 %   5/3/2023: HA1c 5%          Weight loss:  Recent weight trends:  12/5/22 161 #  1/5/23 157.2 #  2/1/23 159.2 #  3/3/23 158.6 #  4/2/23 145.8 #  5/3/23 145.8 #  6/2/23 145.2 #  Currently on regular diet, regular texture, thin consistency.  Weights are trending down.      CAD:  On Aspirin.   No complaints of chest pain.      Generalized muscle weakness:  Patient is able to ambulate but usually is always in bed.  Does not ambulate very much.  No recent falls reported.          Reviewed  EMR  Reviewed medical, social, surgical and family history.  Reviewed all current medications and performed medication reconciliation.  Performed prescription drug management.  Reviewed vital signs AND lab results  Reviewed [facility] Documentation  Discussed patient with nursing.     ROS: 10 point ROS performed; Negative unless noted in HPI.      Surgical History  Problems    · No history of surgery    Family History  Mother    · No pertinent family history    Social History   · Current every day smoker   · Light cigarette smoker    · Lives in long-term care facility    · No illicit drug use   · Quit consuming alcohol in remote past    Allergies  NoKnown    · No Known Allergies      Outside Labs:  CBC: Date: 10/19/21, WBC: 11.6, Hgb: 12, Hct: 38.5, PLT: 310   CBC: Date: 10/12/21, WBC: 13.2, Hgb: 11.9, Hct: 38.2, PLT: 305   BMP: Date: 10/19/21, Na: 140, K: 4.2, Cl: 104, CO2: 26, BUN: 17, Cr: 1.0, Glu: 51, Ca: 9.7   BMP: Date: 10/12/21, Na: 140, K: 4.3, Cl: 103, CO2: 27, BUN: 17, Cr: 1.1, Glu: 80, Ca: 9.7   Hepatic Function Tests: Date: 10/12/21, AST: 12, ALT: 12, Alk Phos: 44, T Bili: 0.3, Albumin: 4.0   Hepatic Function Tests: Date: 10/19/21, AST: 12, ALT: 11, Alk Phos: 40, T Bili: 0.3, Albumin: 3.9   11/18/20 HA1 C 5.6 %    10/12/21 HA1c 5.5 %.     RECENT  "LABS:  BMP: Date: 5/3/2023 Na 141 K 4.2 Cr 0.9 BUN 17 glucose 49 Ca 9.6   CBC: Date: 5/3/2023 WBC 13.3 Hgb 11.3 hematocrit 36.5 platelets 323  Liver function: Date: 5/3/2023 ALT 7 AST 11 alkaline phos.  47 bilirubin 0.4 albumin 3.8 protein 8.2  Date: 5/3/2023: Lipid panel: Cholesterol 137; triglycerides 65; HDL 31; LDL 93.  Date 5/3/2023: HA1c 5%      Lab Results   Component Value Date    WBC 17.2 (H) 11/11/2020    HGB 12.8 (L) 11/11/2020    HCT 41.9 11/11/2020     11/11/2020    ALT 12 11/11/2020    AST 14 11/11/2020     11/11/2020    K 3.7 11/11/2020     11/11/2020    CREATININE 0.96 11/11/2020    BUN 14 11/11/2020    CO2 27 11/11/2020    HGBA1C 5.6 11/11/2020             /70   Pulse 66   Temp 36.1 °C (97 °F)   Resp 18   Ht 1.753 m (5' 9\")   Wt 65.9 kg (145 lb 3.2 oz)   SpO2 99%   BMI 21.44 kg/m²      Physical Exam  Vitals and nursing note reviewed.   Constitutional:       General: He is awake.      Appearance: He is underweight.      Comments: Chronically ill   HENT:      Head: Normocephalic.      Right Ear: External ear normal.      Left Ear: External ear normal.      Nose: Nose normal.      Mouth/Throat:      Mouth: Mucous membranes are moist.      Pharynx: Oropharynx is clear.   Eyes:      Extraocular Movements: Extraocular movements intact.      Conjunctiva/sclera: Conjunctivae normal.      Pupils: Pupils are equal, round, and reactive to light.   Cardiovascular:      Rate and Rhythm: Normal rate and regular rhythm.      Pulses: Normal pulses.      Heart sounds: Normal heart sounds.   Pulmonary:      Effort: Pulmonary effort is normal.      Breath sounds: Normal breath sounds.   Abdominal:      General: Bowel sounds are normal.      Palpations: Abdomen is soft.   Musculoskeletal:         General: Normal range of motion.      Cervical back: Normal range of motion and neck supple.   Skin:     General: Skin is warm and dry.   Neurological:      Mental Status: He is alert and " oriented to person, place, and time. Mental status is at baseline.      Motor: Weakness present.   Psychiatric:         Mood and Affect: Mood normal. Affect is flat.         Behavior: Behavior is slowed. Behavior is not agitated or aggressive. Behavior is cooperative.         Judgment: Judgment is not inappropriate.          Assessment/Plan      Head injury; s/p fall:  Nursing and patient reports that patient fell and hit his head earlier today.  He was at the vending machine and bent over because he dropped a quarter and lost his balance and fell backwards hitting his head on the floor.  The fall was unwitnessed. His head is not bleeding. He did not lose consciousness. He did not report feeling dizziness prior to falling.   He states his head is sore where he bumped it.       Ordered nursing to send patient to [facility] ER for evaluation and CT of the head related to fall.       Nursing to call report to [facility] ER nurse explaining accident.       Nursing to schedule transportation to [facility]-pt. Does not need to go 911.    Osteoarthritis:   Continue acetaminophen PRN for pain.        Fall prevention strategies.       PT/OT eval/treat 2/2 to fall.    Paranoid Schizophrenia;unspecified psychosis; Major Depressive Disorder :   Continue Fluphenazine.    Type 2 Diabetes:   Continue Metformin 500 mg PO BID & Glyburide. 2.5 mg PO Q AM.  Monitor accuchecks QAM for 1 week.     Weight loss:  Monitor weights.  Continue regular diet, regular texture, thin consistency.     CAD:  Continue Aspirin.   Monitor for chest pain.    Generalized muscle weakness:  Fall prevention strategies.  PT/OT eval. Since recent fall.           Problem List Items Addressed This Visit          Circulatory    CAD (coronary artery disease)       Endocrine/Metabolic    Type 2 diabetes mellitus (CMS/HCC)       Other    Chronic paranoid schizophrenia (CMS/HCC)    Major depressive disorder     Other Visit Diagnoses       Injury of head, initial  encounter    -  Primary    Accidental fall, initial encounter        Osteoarthritis, unspecified osteoarthritis type, unspecified site        Weight loss        Generalized muscle weakness            Bethany Jones, APRN-CNP    details…

## 2023-06-29 NOTE — PROGRESS NOTES
Subjective- monthly follow up.  H/O schizophrenia, hypertension, hyperlipidemia, diabetes seen today for a routine visit .He is sitting in his bed and denies any complaints no concerns per staff.  ROS:  General-no fever  Chest-no pain  Respiratory-nocough  Abdomen-no pain or diarrhea  General: no acute distress  Pain:  Vital signs: /70  T 97  79 Weight 145.2 lbs  Gen- NAD, sitting in bed  Lungs: clear to auscultation B/L  Cardio: S1-S2 normal  ABD: Soft, nontender  Musc/Skel:No deformities  Extremities: No pedal edema  Neuro: No neurological deficits  Psych: Schizophrenia  Assessment/plan-  Hypertension- stable  c/w ASA  Hyperlipidemia-  Diabetes type 2-Hba1c 5.0  D/C glyburide, c/w Metformin  Schizophrenia-continue with fluphenazine  Follow-up with psych  Weakness- c/w supportive care

## 2023-07-11 ENCOUNTER — NURSING HOME VISIT (OUTPATIENT)
Dept: POST ACUTE CARE | Facility: EXTERNAL LOCATION | Age: 63
End: 2023-07-11
Payer: MEDICAID

## 2023-07-11 DIAGNOSIS — M19.90 OSTEOARTHRITIS, UNSPECIFIED OSTEOARTHRITIS TYPE, UNSPECIFIED SITE: ICD-10-CM
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
## 2023-07-11 DIAGNOSIS — F20.0 CHRONIC PARANOID SCHIZOPHRENIA (MULTI): ICD-10-CM

## 2023-07-11 DIAGNOSIS — M62.81 GENERALIZED MUSCLE WEAKNESS: ICD-10-CM

## 2023-07-11 DIAGNOSIS — F17.200 SMOKER UNMOTIVATED TO QUIT: ICD-10-CM

## 2023-07-11 DIAGNOSIS — F33.9 RECURRENT MAJOR DEPRESSIVE DISORDER, REMISSION STATUS UNSPECIFIED (CMS-HCC): ICD-10-CM

## 2023-07-11 DIAGNOSIS — R63.4 WEIGHT LOSS: ICD-10-CM

## 2023-07-11 DIAGNOSIS — E11.9 TYPE 2 DIABETES MELLITUS WITHOUT COMPLICATION, WITHOUT LONG-TERM CURRENT USE OF INSULIN (MULTI): ICD-10-CM

## 2023-07-11 DIAGNOSIS — S09.90XS HEAD INJURY, SEQUELA: Primary | ICD-10-CM

## 2023-07-11 DIAGNOSIS — I25.10 CORONARY ARTERY DISEASE INVOLVING NATIVE HEART WITHOUT ANGINA PECTORIS, UNSPECIFIED VESSEL OR LESION TYPE: ICD-10-CM

## 2023-07-11 DIAGNOSIS — F29 PSYCHOSIS, UNSPECIFIED PSYCHOSIS TYPE (MULTI): ICD-10-CM

## 2023-07-11 DIAGNOSIS — J44.9 CHRONIC OBSTRUCTIVE PULMONARY DISEASE, UNSPECIFIED COPD TYPE (MULTI): ICD-10-CM

## 2023-07-11 PROCEDURE — 99309 SBSQ NF CARE MODERATE MDM 30: CPT | Performed by: NURSE PRACTITIONER

## 2023-07-11 NOTE — LETTER
Patient: Carter Jefferson  : 1960    Encounter Date: 2023    Name: Carter Jefferson    YOB: 1960    Code Status: Full Code    Chief Complaint:   Follow up on Head injury; s/p accidental fall--sequela; etc....    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     RECENT EMERGENCY ROOM COURSE:  Patient sent to [facility] ER on 23 after a fall and head injury from [facility].   In the ER it was thought that the mechanical fall was low suspicion for TIA or stroke STEMI and STEMI metabolic   Encephalopathy. Patient is his baseline mental status no red flags no fever no tachycardia   no hypoxia and remains normotensive neuro exam is normal no traumatic injury was noted on examination.  Primary secondary survey was negative.    In the ER patient had a CT scan and cervical spine scan which were without contrast and were negative unremarkable.  He also had a lung x-ray, which showed COPD. [facility] was [facility] to [facility] from the ER with   supportive care with strict and precautions and fall precautions.         Patient at [facility] in Long Term Care.    Diagnoses as follows:    Head injury; s/p fall--sequela:  Nursing and patient reported that patient fell and hit his head on 23.  He was at the vending machine and bent over because he dropped a quarter and  lost his balance and fell backwards hitting his head on the floor.  The fall was unwitnessed. His head is not bleeding. He did not lose consciousness. He did not report feeling dizziness prior to falling.   Patient was sent to [facility] Er for evaluation and treatment.   He was not found to have any injuries related to the fall.   Patient seen today, he is in bed.   No complaints of head pain or headaches.   His mentation is at baseline.   He is alert, calm, and cooperative.   No complaints of dizziness.  No chest pain.    Type 2 Diabetes:   On Metformin 500 mg PO BID & Glyburide. 2.5 mg PO Q AM.   HA1c results:  11/18/21 HA1c 5.6 %   10/12/21 HA1c 5.5 %   5/3/2023: HA1c 5%      COPD; current every day smoker:  Recent chest x-ray performed in the ER at [facility] showed COPD.  No complaints of cough. No SOB, or desaturations reported.   Not on any medications for COPD.   Patient smokes daily, he states he smokes 2-3 cigarettes per day.  Discussed smoking cessation.   He does not wish to quit smoking at this time.         Major Depressive Disorder; Paranoid Schizophrenia;unspecified psychosis;:   On Fluphenazine.  He is compliant with taking his medications.  Cooperative but sometimes irritable.  Mentation at baseline.  No agitation reported.   No recent weight loss reported.   Patient sleeps a lot.     Osteoarthritis:   On acetaminophen PRN for pain.   No complaints of pain today.  No reports of patient complaining of pain from staff.  Ambulates independently without any devices.  He does not walk around frequently.     CAD:    On aspirin.      No complaints of chest pain.          Weight loss:  Recent weight trends:  1/5/23 157.2 #  2/1/23 159.2 #  3/3/23 158.6 #  4/2/23 145.8 #  5/3/23 145.8 #  6/2/23 145.2 #  7/5/23 143 #  Currently on regular diet, regular texture, thin consistency.  Weights are trending down.     Generalized muscle weakness:  Patient is able to ambulate but usually is always in bed.  Does not  ambulate very much.       Reviewed [facility] ER records.   Reviewed  EMR  Reviewed medical, social, surgical and family history.  Reviewed all current medications and performed medication reconciliation.  Performed prescription drug management.  Reviewed vital signs AND lab results  Reviewed [facility] Documentation  Discussed patient with nursing.     ROS: 10 point ROS performed; Negative unless noted in HPI.      Surgical History  Problems    · No history of surgery    Family History  Mother    · No pertinent family history    Social History   · Current every day smoker   · Light cigarette smoker    · Lives in long-term care facility    · No illicit drug use   · Quit consuming alcohol in remote past    Allergies  NoKnown    · No Known Allergies      Outside Labs:  CBC: Date: 10/19/21, WBC: 11.6, Hgb: 12, Hct: 38.5, PLT: 310   CBC: Date: 10/12/21, WBC: 13.2, Hgb: 11.9, Hct: 38.2, PLT: 305   BMP: Date: 10/19/21, Na: 140, K: 4.2, Cl: 104, CO2: 26, BUN: 17, Cr: 1.0, Glu: 51, Ca: 9.7   BMP: Date: 10/12/21, Na: 140, K: 4.3, Cl: 103, CO2: 27, BUN: 17, Cr: 1.1, Glu: 80, Ca: 9.7   Hepatic Function Tests: Date: 10/12/21, AST: 12, ALT: 12, Alk Phos: 44, T Bili: 0.3, Albumin: 4.0   Hepatic Function Tests: Date: 10/19/21, AST: 12, ALT: 11, Alk Phos: 40, T Bili: 0.3, Albumin: 3.9   11/18/20 HA1 C 5.6 %    10/12/21 HA1c 5.5 %.     RECENT LABS:  BMP: Date: 5/3/2023 Na 141 K 4.2 Cr 0.9 BUN 17 glucose 49 Ca 9.6   CBC: Date: 5/3/2023 WBC 13.3 Hgb 11.3 hematocrit 36.5 platelets 323  Liver function: Date: 5/3/2023 ALT 7 AST 11 alkaline phos.  47 bilirubin 0.4 albumin 3.8 protein 8.2  Date: 5/3/2023: Lipid panel: Cholesterol 137; triglycerides 65; HDL 31; LDL 93.  Date 5/3/2023: HA1c 5%      Lab Results   Component Value Date    WBC 17.2 (H) 11/11/2020    HGB 12.8 (L) 11/11/2020    HCT 41.9 11/11/2020     11/11/2020    ALT 12 11/11/2020    AST 14 11/11/2020     11/11/2020    K 3.7 11/11/2020      "11/11/2020    CREATININE 0.96 11/11/2020    BUN 14 11/11/2020    CO2 27 11/11/2020    HGBA1C 5.6 11/11/2020             /70   Pulse 66   Temp 36.1 °C (97 °F)   Resp 18   Ht 1.753 m (5' 9\")   Wt 64.9 kg (143 lb)   SpO2 98%   BMI 21.12 kg/m²      Physical Exam  Vitals and nursing note reviewed.   Constitutional:       General: He is awake.      Appearance: He is underweight.      Comments: Chronically ill   HENT:      Head: Normocephalic.      Right Ear: External ear normal.      Left Ear: External ear normal.      Nose: Nose normal.      Mouth/Throat:      Mouth: Mucous membranes are moist.      Pharynx: Oropharynx is clear.   Eyes:      Extraocular Movements: Extraocular movements intact.      Conjunctiva/sclera: Conjunctivae normal.      Pupils: Pupils are equal, round, and reactive to light.   Cardiovascular:      Rate and Rhythm: Normal rate and regular rhythm.      Pulses: Normal pulses.      Heart sounds: Normal heart sounds.   Pulmonary:      Effort: Pulmonary effort is normal.      Breath sounds: Normal breath sounds.   Abdominal:      General: Bowel sounds are normal.      Palpations: Abdomen is soft.   Musculoskeletal:         General: Normal range of motion.      Cervical back: Normal range of motion and neck supple.   Skin:     General: Skin is warm and dry.   Neurological:      Mental Status: He is alert and oriented to person, place, and time. Mental status is at baseline.      Motor: Weakness present.   Psychiatric:         Mood and Affect: Mood normal. Affect is flat.         Behavior: Behavior is slowed. Behavior is not agitated or aggressive. Behavior is cooperative.         Judgment: Judgment is not inappropriate.          Assessment/Plan   Ordered CMP, CBC with diff. And Ha1c for tomorrow.    Head injury; s/p fall--sequela:  No complaints of head pain.       Patient was sent to [facility] Er for evaluation and treatment.   He was not found to have any injuries related to the fall. "   Fall prevention strategies.    Type 2 Diabetes:   Continue Metformin 500 mg PO BID & Glyburide. 2.5 mg PO Q AM.   Monitor Ha1c.      COPD; current every day smoker:  Recent chest x-ray performed in the ER at [facility] showed COPD.  Monitor for cough and SOB.  Discussed smoking cessation.   He does not wish to quit smoking at this time.         Major Depressive Disorder; Paranoid Schizophrenia;unspecified psychosis;:   Continue Fluphenazine.  Monitor for agitation and psychosis.         Osteoarthritis:   Continue acetaminophen PRN for pain.        CAD:    Continue aspirin.      Monitor for chest pain          Weight loss:  Monitor weights.  Continue regular diet, regular texture, thin consistency.  Dietary consult.          Generalized muscle weakness:       Fall prevention strategies.                  Problem List Items Addressed This Visit          Cardiac and Vasculature    CAD (coronary artery disease)       Endocrine/Metabolic    Type 2 diabetes mellitus (CMS/HCC)       Mental Health    Chronic paranoid schizophrenia (CMS/HCC)    Major depressive disorder     Other Visit Diagnoses       Head injury, sequela    -  Primary    Accidental fall, sequela        Chronic obstructive pulmonary disease, unspecified COPD type (CMS/HCC)        Smoker unmotivated to quit        Psychosis, unspecified psychosis type (CMS/HCC)        Osteoarthritis, unspecified osteoarthritis type, unspecified site        Weight loss        Generalized muscle weakness            CHON Valentine       Electronically Signed By: CHON Valenitne   7/15/23  6:02 PM

## 2023-07-15 VITALS
HEART RATE: 66 BPM | WEIGHT: 143 LBS | DIASTOLIC BLOOD PRESSURE: 70 MMHG | SYSTOLIC BLOOD PRESSURE: 122 MMHG | OXYGEN SATURATION: 98 % | RESPIRATION RATE: 18 BRPM | HEIGHT: 69 IN | BODY MASS INDEX: 21.18 KG/M2 | TEMPERATURE: 97 F

## 2023-07-15 NOTE — PROGRESS NOTES
Name: Carter Jefferson    YOB: 1960    Code Status: Full Code    Chief Complaint:   Follow up on Head injury; s/p accidental fall--sequela; etc....    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     RECENT EMERGENCY ROOM COURSE:  Patient sent to [facility] ER on 6/22/23 after a fall and head injury from [facility].   In the ER it was thought that the mechanical fall was low suspicion for TIA or stroke STEMI and STEMI metabolic   Encephalopathy. Patient is his baseline mental status no red flags no fever no tachycardia   no hypoxia and remains normotensive neuro exam is normal no traumatic injury was noted on examination.  Primary secondary survey was negative.    In the ER patient had a CT scan and cervical spine scan which were without contrast and were negative unremarkable.  He also had a lung x-ray, which showed COPD. [facility] to [facility] from the ER with   supportive care with strict and precautions and fall precautions.         Patient at [facility] in Long Term Care.    Diagnoses as follows:    Head injury; s/p fall--sequela:  Nursing and patient reported that patient fell and hit his head on 6/22/23.  He was at the vending machine and bent over because he dropped a quarter and lost his balance and fell backwards hitting his head on the floor.  The fall  was unwitnessed. His head is not bleeding. He did not lose consciousness. He did not report feeling dizziness prior to falling.   Patient was sent to [facility] Er for evaluation and treatment.   He was not found to have any injuries related to the fall.   Patient seen today, he is in bed.   No complaints of head pain or headaches.   His mentation is at baseline.   He is alert, calm, and cooperative.   No complaints of dizziness.  No chest pain.    Type 2 Diabetes:   On Metformin 500 mg PO BID & Glyburide. 2.5 mg PO Q AM.   HA1c results:  11/18/21 HA1c 5.6 %   10/12/21 HA1c 5.5 %   5/3/2023: HA1c 5%      COPD; current every day smoker:  Recent chest x-ray performed in the ER at [facility] showed COPD.  No complaints of cough. No SOB, or desaturations reported.   Not on any medications for COPD.   Patient smokes daily, he states he smokes 2-3 cigarettes per day.  Discussed smoking cessation.   He does not wish to quit smoking at this time.         Major Depressive Disorder; Paranoid Schizophrenia;unspecified psychosis;:   On Fluphenazine.  He is compliant with taking his medications.  Cooperative but sometimes irritable.  Mentation at baseline.  No agitation reported.   No recent weight loss reported.   Patient sleeps a lot.     Osteoarthritis:   On acetaminophen PRN for pain.   No complaints of pain today.  No reports of patient complaining of pain from staff.  Ambulates independently without any devices.  He does not walk around frequently.     CAD:    On aspirin.      No complaints of chest pain.          Weight loss:  Recent weight trends:  1/5/23 157.2 #  2/1/23 159.2 #  3/3/23 158.6 #  4/2/23 145.8 #  5/3/23 145.8 #  6/2/23 145.2 #  7/5/23 143 #  Currently on regular diet, regular texture, thin consistency.  Weights are trending down.     Generalized muscle weakness:  Patient is able to ambulate but usually is always in bed.  Does not ambulate very much.       Reviewed [facility] ER records.   Reviewed   EMR  Reviewed medical, social, surgical and family history.  Reviewed all current medications and performed medication reconciliation.  Performed prescription drug management.  Reviewed vital signs AND lab results  Reviewed [facility] Care Documentation  Discussed patient with nursing.     ROS: 10 point ROS performed; Negative unless noted in HPI.      Surgical History  Problems    · No history of surgery    Family History  Mother    · No pertinent family history    Social History   · Current every day smoker   · Light cigarette smoker    · Lives in long-term care facility    · No illicit drug use   · Quit consuming alcohol in remote past    Allergies  NoKnown    · No Known Allergies      Outside Labs:  CBC: Date: 10/19/21, WBC: 11.6, Hgb: 12, Hct: 38.5, PLT: 310   CBC: Date: 10/12/21, WBC: 13.2, Hgb: 11.9, Hct: 38.2, PLT: 305   BMP: Date: 10/19/21, Na: 140, K: 4.2, Cl: 104, CO2: 26, BUN: 17, Cr: 1.0, Glu: 51, Ca: 9.7   BMP: Date: 10/12/21, Na: 140, K: 4.3, Cl: 103, CO2: 27, BUN: 17, Cr: 1.1, Glu: 80, Ca: 9.7   Hepatic Function Tests: Date: 10/12/21, AST: 12, ALT: 12, Alk Phos: 44, T Bili: 0.3, Albumin: 4.0   Hepatic Function Tests: Date: 10/19/21, AST: 12, ALT: 11, Alk Phos: 40, T Bili: 0.3, Albumin: 3.9   11/18/20 HA1 C 5.6 %    10/12/21 HA1c 5.5 %.     RECENT LABS:  BMP: Date: 5/3/2023 Na 141 K 4.2 Cr 0.9 BUN 17 glucose 49 Ca 9.6   CBC: Date: 5/3/2023 WBC 13.3 Hgb 11.3 hematocrit 36.5 platelets 323  Liver function: Date: 5/3/2023 ALT 7 AST 11 alkaline phos.  47 bilirubin 0.4 albumin 3.8 protein 8.2  Date: 5/3/2023: Lipid panel: Cholesterol 137; triglycerides 65; HDL 31; LDL 93.  Date 5/3/2023: HA1c 5%      Lab Results   Component Value Date    WBC 17.2 (H) 11/11/2020    HGB 12.8 (L) 11/11/2020    HCT 41.9 11/11/2020     11/11/2020    ALT 12 11/11/2020    AST 14 11/11/2020     11/11/2020    K 3.7 11/11/2020     11/11/2020    CREATININE 0.96 11/11/2020    BUN 14 11/11/2020    CO2 27  "11/11/2020    HGBA1C 5.6 11/11/2020             /70   Pulse 66   Temp 36.1 °C (97 °F)   Resp 18   Ht 1.753 m (5' 9\")   Wt 64.9 kg (143 lb)   SpO2 98%   BMI 21.12 kg/m²      Physical Exam  Vitals and nursing note reviewed.   Constitutional:       General: He is awake.      Appearance: He is underweight.      Comments: Chronically ill   HENT:      Head: Normocephalic.      Right Ear: External ear normal.      Left Ear: External ear normal.      Nose: Nose normal.      Mouth/Throat:      Mouth: Mucous membranes are moist.      Pharynx: Oropharynx is clear.   Eyes:      Extraocular Movements: Extraocular movements intact.      Conjunctiva/sclera: Conjunctivae normal.      Pupils: Pupils are equal, round, and reactive to light.   Cardiovascular:      Rate and Rhythm: Normal rate and regular rhythm.      Pulses: Normal pulses.      Heart sounds: Normal heart sounds.   Pulmonary:      Effort: Pulmonary effort is normal.      Breath sounds: Normal breath sounds.   Abdominal:      General: Bowel sounds are normal.      Palpations: Abdomen is soft.   Musculoskeletal:         General: Normal range of motion.      Cervical back: Normal range of motion and neck supple.   Skin:     General: Skin is warm and dry.   Neurological:      Mental Status: He is alert and oriented to person, place, and time. Mental status is at baseline.      Motor: Weakness present.   Psychiatric:         Mood and Affect: Mood normal. Affect is flat.         Behavior: Behavior is slowed. Behavior is not agitated or aggressive. Behavior is cooperative.         Judgment: Judgment is not inappropriate.          Assessment/Plan    Ordered CMP, CBC with diff. And Ha1c for tomorrow.    Head injury; s/p fall--sequela:  No complaints of head pain.       Patient was sent to [facility] Er for evaluation and treatment.   He was not found to have any injuries related to the fall.   Fall prevention strategies.    Type 2 Diabetes:   Continue Metformin 500 " mg PO BID & Glyburide. 2.5 mg PO Q AM.   Monitor Ha1c.      COPD; current every day smoker:  Recent chest x-ray performed in the ER at [facility] showed COPD.  Monitor for cough and SOB.  Discussed smoking cessation.   He does not wish to quit smoking at this time.         Major Depressive Disorder; Paranoid Schizophrenia;unspecified psychosis;:   Continue Fluphenazine.  Monitor for agitation and psychosis.         Osteoarthritis:   Continue acetaminophen PRN for pain.        CAD:    Continue aspirin.      Monitor for chest pain          Weight loss:  Monitor weights.  Continue regular diet, regular texture, thin consistency.  Dietary consult.          Generalized muscle weakness:       Fall prevention strategies.                  Problem List Items Addressed This Visit          Cardiac and Vasculature    CAD (coronary artery disease)       Endocrine/Metabolic    Type 2 diabetes mellitus (CMS/HCC)       Mental Health    Chronic paranoid schizophrenia (CMS/HCC)    Major depressive disorder     Other Visit Diagnoses       Head injury, sequela    -  Primary    Accidental fall, sequela        Chronic obstructive pulmonary disease, unspecified COPD type (CMS/HCC)        Smoker unmotivated to quit        Psychosis, unspecified psychosis type (CMS/HCC)        Osteoarthritis, unspecified osteoarthritis type, unspecified site        Weight loss        Generalized muscle weakness            Bethany Jones, APRN-CNP

## 2023-08-03 ENCOUNTER — NURSING HOME VISIT (OUTPATIENT)
Dept: POST ACUTE CARE | Facility: EXTERNAL LOCATION | Age: 63
End: 2023-08-03
Payer: MEDICAID

## 2023-08-03 DIAGNOSIS — E11.9 TYPE 2 DIABETES MELLITUS WITHOUT COMPLICATION, WITHOUT LONG-TERM CURRENT USE OF INSULIN (MULTI): Primary | ICD-10-CM

## 2023-08-03 DIAGNOSIS — F20.0 CHRONIC PARANOID SCHIZOPHRENIA (MULTI): ICD-10-CM

## 2023-08-03 DIAGNOSIS — Z91.199 NONCOMPLIANCE WITH DIAGNOSTIC TESTING: ICD-10-CM

## 2023-08-03 DIAGNOSIS — F29 PSYCHOSIS, UNSPECIFIED PSYCHOSIS TYPE (MULTI): ICD-10-CM

## 2023-08-03 DIAGNOSIS — E11.42 DIABETIC POLYNEUROPATHY ASSOCIATED WITH TYPE 2 DIABETES MELLITUS (MULTI): ICD-10-CM

## 2023-08-03 DIAGNOSIS — M62.81 GENERALIZED MUSCLE WEAKNESS: ICD-10-CM

## 2023-08-03 DIAGNOSIS — R63.4 WEIGHT LOSS: ICD-10-CM

## 2023-08-03 DIAGNOSIS — F33.9 RECURRENT MAJOR DEPRESSIVE DISORDER, REMISSION STATUS UNSPECIFIED (CMS-HCC): ICD-10-CM

## 2023-08-03 DIAGNOSIS — M19.90 OSTEOARTHRITIS, UNSPECIFIED OSTEOARTHRITIS TYPE, UNSPECIFIED SITE: ICD-10-CM

## 2023-08-03 DIAGNOSIS — I25.10 CORONARY ARTERY DISEASE INVOLVING NATIVE HEART WITHOUT ANGINA PECTORIS, UNSPECIFIED VESSEL OR LESION TYPE: ICD-10-CM

## 2023-08-03 PROCEDURE — 99309 SBSQ NF CARE MODERATE MDM 30: CPT | Performed by: NURSE PRACTITIONER

## 2023-08-03 NOTE — LETTER
Patient: Carter Jefferson  : 1960    Encounter Date: 2023    Name: Carter Jefferson    YOB: 1960    Code Status: Full Code    Chief Complaint:   Follow up on DM 2; etc....    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     Diagnoses as follows:    Type 2 Diabetes:   On Metformin 500 mg PO BID & Glyburide 2.5 mg PO Q AM.  Metformin was recently reduced from 1000 mg PO BID to 500 mg PO BID due to Ha1c remaining consistently low.    HA1c results:  21 HA1c 5.6 %   10/12/21 HA1c 5.5 %   5/3/2023: HA1c 5%  Monitored accuchecks briefly.  Accucheck results were: 220 mg/dL, 97 mg/dL, 137 mg/dL.  Patient seen today he is in bed.  Typically the patient is usually in bed.   Calm, cooperative.  Mentation at baseline.  No complaints of chest pain, headaches or dizziness.        Paranoid Schizophrenia;unspecified psychosis; Major Depressive Disorder:   On Fluphenazine.  He is compliant with taking his medications.  Cooperative but sometimes irritable.  He is calm and cooperative during assessment today.  His mentation at baseline.  No agitation reported by staff.  Patient sleeps a lot.      CAD:  On Aspirin.   No complaints of chest pain.    Diabetic neuropathy:  Not on any medications currently for neuropathy.  No complaints of neuropathy symptoms today.       Osteoarthritis:   On acetaminophen PRN for pain.   No  complaints of pain today.  No reports of patient complaining of pain from staff.  Ambulates independently without any devices.  He does not walk around frequently.  No recent falls reported.     Weight loss:  Recent weight trends:  12/5/22 161 #  1/5/23 157.2 #  2/1/23 159.2 #  3/3/23 158.6 #  4/2/23 145.8 #  5/3/23 145.8 #  6/2/23 145.2 #  7/5/23 143 #  8/3/23 148 #  Currently on regular diet, regular texture, thin consistency.  Patient has gained a small amount of weight this month.       Noncompliance with diagnostic testing:  Patient has been noncompliant with diagnostic testing.  He always refused blood work.  Patient recently refused blood work again in July.   Have discussed the importance of bloodwork with the patient.  He allowed blood work to be done on 5/3/23.    Generalized muscle weakness:  Patient is able to ambulate but usually is always in bed.  Does not ambulate very much.  No recent falls reported.          Reviewed  EMR  Reviewed medical, social, surgical and family history.  Reviewed all current medications and performed medication reconciliation.  Performed prescription drug management.  Reviewed vital signs AND lab results  Reviewed [facility] Documentation  Discussed patient with nursing.     ROS: 10 point ROS performed; Negative unless noted in HPI.      Surgical History  Problems    · No history of surgery    Family History  Mother    · No pertinent family history    Social History   · Current every day smoker   · Light cigarette smoker    · Lives in long-term care facility    · No illicit drug use   · Quit consuming alcohol in remote past    Allergies  NoKnown    · No Known Allergies      Outside Labs:  CBC: Date: 10/19/21, WBC: 11.6, Hgb: 12, Hct: 38.5, PLT: 310   CBC: Date: 10/12/21, WBC: 13.2, Hgb: 11.9, Hct: 38.2, PLT: 305   BMP: Date: 10/19/21, Na: 140, K: 4.2, Cl: 104, CO2: 26, BUN: 17, Cr: 1.0, Glu: 51, Ca: 9.7   BMP: Date: 10/12/21, Na: 140, K: 4.3, Cl: 103, CO2: 27, BUN:  "17, Cr: 1.1, Glu: 80, Ca: 9.7   Hepatic Function Tests: Date: 10/12/21, AST: 12, ALT: 12, Alk Phos: 44, T Bili: 0.3, Albumin: 4.0   Hepatic Function Tests: Date: 10/19/21, AST: 12, ALT: 11, Alk Phos: 40, T Bili: 0.3, Albumin: 3.9   11/18/20 HA1 C 5.6 %    10/12/21 HA1c 5.5 %.     RECENT LABS:  BMP: Date: 5/3/2023 Na 141 K 4.2 Cr 0.9 BUN 17 glucose 49 Ca 9.6   CBC: Date: 5/3/2023 WBC 13.3 Hgb 11.3 hematocrit 36.5 platelets 323  Liver function: Date: 5/3/2023 ALT 7 AST 11 alkaline phos.  47 bilirubin 0.4 albumin 3.8 protein 8.2  Date: 5/3/2023: Lipid panel: Cholesterol 137; triglycerides 65; HDL 31; LDL 93.  Date 5/3/2023: HA1c 5%      Lab Results   Component Value Date    WBC 17.2 (H) 11/11/2020    HGB 12.8 (L) 11/11/2020    HCT 41.9 11/11/2020     11/11/2020    ALT 12 11/11/2020    AST 14 11/11/2020     11/11/2020    K 3.7 11/11/2020     11/11/2020    CREATININE 0.96 11/11/2020    BUN 14 11/11/2020    CO2 27 11/11/2020    HGBA1C 5.6 11/11/2020             /70   Pulse 68   Temp 36.4 °C (97.6 °F)   Resp 18   Ht 1.753 m (5' 9\")   Wt 67.1 kg (148 lb)   SpO2 98%   BMI 21.86 kg/m²      Physical Exam  Vitals and nursing note reviewed.   Constitutional:       General: He is awake.      Appearance: He is underweight.      Comments: Chronically ill   HENT:      Head: Normocephalic.      Right Ear: External ear normal.      Left Ear: External ear normal.      Nose: Nose normal.      Mouth/Throat:      Mouth: Mucous membranes are moist.      Pharynx: Oropharynx is clear.   Eyes:      Extraocular Movements: Extraocular movements intact.      Conjunctiva/sclera: Conjunctivae normal.      Pupils: Pupils are equal, round, and reactive to light.   Cardiovascular:      Rate and Rhythm: Normal rate and regular rhythm.      Pulses: Normal pulses.      Heart sounds: Normal heart sounds.   Pulmonary:      Effort: Pulmonary effort is normal.      Breath sounds: Normal breath sounds.   Abdominal:      " General: Bowel sounds are normal.      Palpations: Abdomen is soft.   Musculoskeletal:         General: Normal range of motion.      Cervical back: Normal range of motion and neck supple.   Skin:     General: Skin is warm and dry.   Neurological:      Mental Status: He is alert and oriented to person, place, and time. Mental status is at baseline.      Motor: Weakness present.   Psychiatric:         Mood and Affect: Mood normal. Affect is flat.         Behavior: Behavior is slowed. Behavior is not agitated or aggressive. Behavior is cooperative.         Judgment: Judgment is not inappropriate.          Assessment/Plan   Ordered CMP, CBC with diff. Ha1c and vitamin D 25 hydroxy level.      Type 2 Diabetes:  Continue Metformin 500 mg PO BID.   Continue Glyburide 2.5 mg PO Q AM.   Monitor Ha1c Q 3 months.  ###Start to monitor accuchecks Q AM ROUTINELY for 2 weeks.   If accuchecks WNL will reduce/discontinue some medications.     Paranoid Schizophrenia;unspecified psychosis; Major Depressive Disorder:           Continue Fluphenazine.          Monitor for agitation.      CAD:    Continue Aspirin.     Monitor for chest pain.    Diabetic neuropathy:  Monitor for worsening neuropathy.    Osteoarthritis:   Continue acetaminophen PRN for pain.   Monitor for pain.     Weight loss:  Monitor weights.  Continue regular diet, regular texture, thin consistency.    Noncompliance with diagnostic testing:  Discussed noncompliance with patient.    Generalized muscle weakness:      Fall prevention strategies.                     Problem List Items Addressed This Visit          Cardiac and Vasculature    CAD (coronary artery disease)       Endocrine/Metabolic    Type 2 diabetes mellitus (CMS/HCC) - Primary       Mental Health    Chronic paranoid schizophrenia (CMS/HCC)    Major depressive disorder       Neuro    Diabetic neuropathy (CMS/HCC)     Other Visit Diagnoses       Psychosis, unspecified psychosis type (CMS/HCC)         Osteoarthritis, unspecified osteoarthritis type, unspecified site        Weight loss        Noncompliance with diagnostic testing        Generalized muscle weakness            CHON Valentine       Electronically Signed By: CHON Valentine   8/6/23  6:09 PM

## 2023-08-06 VITALS
TEMPERATURE: 97.6 F | BODY MASS INDEX: 21.92 KG/M2 | HEART RATE: 68 BPM | DIASTOLIC BLOOD PRESSURE: 70 MMHG | OXYGEN SATURATION: 98 % | SYSTOLIC BLOOD PRESSURE: 122 MMHG | RESPIRATION RATE: 18 BRPM | WEIGHT: 148 LBS | HEIGHT: 69 IN

## 2023-08-06 NOTE — PROGRESS NOTES
Name: Carter Jefferson    YOB: 1960    Code Status: Full Code    Chief Complaint:   Follow up on DM 2; etc....    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     Diagnoses as follows:    Type 2 Diabetes:   On Metformin 500 mg PO BID & Glyburide 2.5 mg PO Q AM.  Metformin was recently reduced from 1000 mg PO BID to 500 mg PO BID due to Ha1c remaining consistently low.    HA1c results:  11/18/21 HA1c 5.6 %   10/12/21 HA1c 5.5 %   5/3/2023: HA1c 5%  Monitored accuchecks briefly.  Accucheck results were: 220 mg/dL, 97 mg/dL, 137 mg/dL.  Patient seen today he is in bed.  Typically the patient is usually in bed.   Calm, cooperative.  Mentation at baseline.  No complaints of chest pain, headaches or dizziness.        Paranoid Schizophrenia;unspecified psychosis; Major Depressive Disorder:   On Fluphenazine.  He is compliant with taking his medications.  Cooperative but sometimes irritable.  He is calm and cooperative during assessment today.  His mentation at baseline.  No agitation reported by staff.  Patient sleeps a lot.      CAD:  On Aspirin.   No complaints of chest pain.    Diabetic neuropathy:  Not on any medications currently for neuropathy.  No complaints of neuropathy symptoms today.       Osteoarthritis:   On acetaminophen PRN for pain.   No complaints of pain today.  No reports of patient complaining of pain from  staff.  Ambulates independently without any devices.  He does not walk around frequently.  No recent falls reported.     Weight loss:  Recent weight trends:  12/5/22 161 #  1/5/23 157.2 #  2/1/23 159.2 #  3/3/23 158.6 #  4/2/23 145.8 #  5/3/23 145.8 #  6/2/23 145.2 #  7/5/23 143 #  8/3/23 148 #  Currently on regular diet, regular texture, thin consistency.  Patient has gained a small amount of weight this month.       Noncompliance with diagnostic testing:  Patient has been noncompliant with diagnostic testing.  He always refused blood work.  Patient recently refused blood work again in July.   Have discussed the importance of bloodwork with the patient.  He allowed blood work to be done on 5/3/23.    Generalized muscle weakness:  Patient is able to ambulate but usually is always in bed.  Does not ambulate very much.  No recent falls reported.          Reviewed  EMR  Reviewed medical, social, surgical and family history.  Reviewed all current medications and performed medication reconciliation.  Performed prescription drug management.  Reviewed vital signs AND lab results  Reviewed [facility] Documentation  Discussed patient with nursing.     ROS: 10 point ROS performed; Negative unless noted in HPI.      Surgical History  Problems    · No history of surgery    Family History  Mother    · No pertinent family history    Social History   · Current every day smoker   · Light cigarette smoker    · Lives in long-term care facility    · No illicit drug use   · Quit consuming alcohol in remote past    Allergies  NoKnown    · No Known Allergies      Outside Labs:  CBC: Date: 10/19/21, WBC: 11.6, Hgb: 12, Hct: 38.5, PLT: 310   CBC: Date: 10/12/21, WBC: 13.2, Hgb: 11.9, Hct: 38.2, PLT: 305   BMP: Date: 10/19/21, Na: 140, K: 4.2, Cl: 104, CO2: 26, BUN: 17, Cr: 1.0, Glu: 51, Ca: 9.7   BMP: Date: 10/12/21, Na: 140, K: 4.3, Cl: 103, CO2: 27, BUN: 17, Cr: 1.1, Glu: 80, Ca: 9.7   Hepatic Function Tests: Date: 10/12/21,  "AST: 12, ALT: 12, Alk Phos: 44, T Bili: 0.3, Albumin: 4.0   Hepatic Function Tests: Date: 10/19/21, AST: 12, ALT: 11, Alk Phos: 40, T Bili: 0.3, Albumin: 3.9   11/18/20 HA1 C 5.6 %    10/12/21 HA1c 5.5 %.     RECENT LABS:  BMP: Date: 5/3/2023 Na 141 K 4.2 Cr 0.9 BUN 17 glucose 49 Ca 9.6   CBC: Date: 5/3/2023 WBC 13.3 Hgb 11.3 hematocrit 36.5 platelets 323  Liver function: Date: 5/3/2023 ALT 7 AST 11 alkaline phos.  47 bilirubin 0.4 albumin 3.8 protein 8.2  Date: 5/3/2023: Lipid panel: Cholesterol 137; triglycerides 65; HDL 31; LDL 93.  Date 5/3/2023: HA1c 5%      Lab Results   Component Value Date    WBC 17.2 (H) 11/11/2020    HGB 12.8 (L) 11/11/2020    HCT 41.9 11/11/2020     11/11/2020    ALT 12 11/11/2020    AST 14 11/11/2020     11/11/2020    K 3.7 11/11/2020     11/11/2020    CREATININE 0.96 11/11/2020    BUN 14 11/11/2020    CO2 27 11/11/2020    HGBA1C 5.6 11/11/2020             /70   Pulse 68   Temp 36.4 °C (97.6 °F)   Resp 18   Ht 1.753 m (5' 9\")   Wt 67.1 kg (148 lb)   SpO2 98%   BMI 21.86 kg/m²      Physical Exam  Vitals and nursing note reviewed.   Constitutional:       General: He is awake.      Appearance: He is underweight.      Comments: Chronically ill   HENT:      Head: Normocephalic.      Right Ear: External ear normal.      Left Ear: External ear normal.      Nose: Nose normal.      Mouth/Throat:      Mouth: Mucous membranes are moist.      Pharynx: Oropharynx is clear.   Eyes:      Extraocular Movements: Extraocular movements intact.      Conjunctiva/sclera: Conjunctivae normal.      Pupils: Pupils are equal, round, and reactive to light.   Cardiovascular:      Rate and Rhythm: Normal rate and regular rhythm.      Pulses: Normal pulses.      Heart sounds: Normal heart sounds.   Pulmonary:      Effort: Pulmonary effort is normal.      Breath sounds: Normal breath sounds.   Abdominal:      General: Bowel sounds are normal.      Palpations: Abdomen is soft. "   Musculoskeletal:         General: Normal range of motion.      Cervical back: Normal range of motion and neck supple.   Skin:     General: Skin is warm and dry.   Neurological:      Mental Status: He is alert and oriented to person, place, and time. Mental status is at baseline.      Motor: Weakness present.   Psychiatric:         Mood and Affect: Mood normal. Affect is flat.         Behavior: Behavior is slowed. Behavior is not agitated or aggressive. Behavior is cooperative.         Judgment: Judgment is not inappropriate.          Assessment/Plan    Ordered CMP, CBC with diff. Ha1c and vitamin D 25 hydroxy level.      Type 2 Diabetes:  Continue Metformin 500 mg PO BID.   Continue Glyburide 2.5 mg PO Q AM.   Monitor Ha1c Q 3 months.  ###Start to monitor accuchecks Q AM ROUTINELY for 2 weeks.   If accuchecks WNL will reduce/discontinue some medications.     Paranoid Schizophrenia;unspecified psychosis; Major Depressive Disorder:           Continue Fluphenazine.          Monitor for agitation.      CAD:    Continue Aspirin.     Monitor for chest pain.    Diabetic neuropathy:  Monitor for worsening neuropathy.    Osteoarthritis:   Continue acetaminophen PRN for pain.   Monitor for pain.     Weight loss:  Monitor weights.  Continue regular diet, regular texture, thin consistency.    Noncompliance with diagnostic testing:  Discussed noncompliance with patient.    Generalized muscle weakness:      Fall prevention strategies.                     Problem List Items Addressed This Visit          Cardiac and Vasculature    CAD (coronary artery disease)       Endocrine/Metabolic    Type 2 diabetes mellitus (CMS/HCC) - Primary       Mental Health    Chronic paranoid schizophrenia (CMS/HCC)    Major depressive disorder       Neuro    Diabetic neuropathy (CMS/HCC)     Other Visit Diagnoses       Psychosis, unspecified psychosis type (CMS/HCC)        Osteoarthritis, unspecified osteoarthritis type, unspecified site         Weight loss        Noncompliance with diagnostic testing        Generalized muscle weakness            Bethany Jones, APRN-CNP

## 2023-08-23 ENCOUNTER — NURSING HOME VISIT (OUTPATIENT)
Dept: POST ACUTE CARE | Facility: EXTERNAL LOCATION | Age: 63
End: 2023-08-23
Payer: MEDICAID

## 2023-08-23 DIAGNOSIS — I10 PRIMARY HYPERTENSION: ICD-10-CM

## 2023-08-23 DIAGNOSIS — E11.00 TYPE 2 DIABETES MELLITUS WITH HYPEROSMOLARITY WITHOUT COMA, WITHOUT LONG-TERM CURRENT USE OF INSULIN (MULTI): Primary | ICD-10-CM

## 2023-08-23 DIAGNOSIS — F20.89 OTHER SCHIZOPHRENIA (MULTI): ICD-10-CM

## 2023-08-23 DIAGNOSIS — F32.0 CURRENT MILD EPISODE OF MAJOR DEPRESSIVE DISORDER WITHOUT PRIOR EPISODE (CMS-HCC): ICD-10-CM

## 2023-08-23 PROCEDURE — 99308 SBSQ NF CARE LOW MDM 20: CPT | Performed by: INTERNAL MEDICINE

## 2023-08-23 NOTE — LETTER
Patient: Carter Jefferson  : 1960    Encounter Date: 2023    Subjective- monthly follow up.   H/O schizophrenia, hypertension, hyperlipidemia, diabetes seen today for a routine visit .He is lying in his bed and denies any complaints no concerns per staff.   ROS:   General-no fever   Chest-no pain   Respiratory-nocough   Abdomen-no pain or diarrhea   General: no acute distress   Pain:   Vital signs: /95   T 97   79 Weight 148 lbs   Gen- NAD, sitting in bed   Lungs: clear to auscultation B/L   Cardio: S1-S2 normal   ABD: Soft, nontender   Musc/Skel:No deformities   Extremities: No pedal edema   Neuro: No neurological deficits   Psych: Schizophrenia   Assessment/plan-   Hypertension- stable   c/w ASA   Hyperlipidemia-   Diabetes type 2-Hba1c 5.0   D/C glyburide, c/w Metformin   Schizophrenia-continue with fluphenazine   Follow-up with psych   Weakness- c/w supportive care      Electronically Signed By: Dianne Jewell MD   23 12:59 PM

## 2023-08-25 NOTE — PROGRESS NOTES
Subjective- monthly follow up.   H/O schizophrenia, hypertension, hyperlipidemia, diabetes seen today for a routine visit .He is lying in his bed and denies any complaints no concerns per staff.   ROS:   General-no fever   Chest-no pain   Respiratory-nocough   Abdomen-no pain or diarrhea   General: no acute distress   Pain:   Vital signs: /95   T 97   79 Weight 148 lbs   Gen- NAD, sitting in bed   Lungs: clear to auscultation B/L   Cardio: S1-S2 normal   ABD: Soft, nontender   Musc/Skel:No deformities   Extremities: No pedal edema   Neuro: No neurological deficits   Psych: Schizophrenia   Assessment/plan-   Hypertension- stable   c/w ASA   Hyperlipidemia-   Diabetes type 2-Hba1c 5.0   D/C glyburide, c/w Metformin   Schizophrenia-continue with fluphenazine   Follow-up with psych   Weakness- c/w supportive care

## 2023-09-14 ENCOUNTER — NURSING HOME VISIT (OUTPATIENT)
Dept: POST ACUTE CARE | Facility: EXTERNAL LOCATION | Age: 63
End: 2023-09-14
Payer: MEDICAID

## 2023-09-14 DIAGNOSIS — F33.9 RECURRENT MAJOR DEPRESSIVE DISORDER, REMISSION STATUS UNSPECIFIED (CMS-HCC): ICD-10-CM

## 2023-09-14 DIAGNOSIS — E11.9 TYPE 2 DIABETES MELLITUS WITHOUT COMPLICATION, WITHOUT LONG-TERM CURRENT USE OF INSULIN (MULTI): Primary | ICD-10-CM

## 2023-09-14 DIAGNOSIS — F20.0 CHRONIC PARANOID SCHIZOPHRENIA (MULTI): ICD-10-CM

## 2023-09-14 DIAGNOSIS — R63.4 WEIGHT LOSS: ICD-10-CM

## 2023-09-14 DIAGNOSIS — E16.2 HYPOGLYCEMIA: ICD-10-CM

## 2023-09-14 DIAGNOSIS — M62.81 GENERALIZED MUSCLE WEAKNESS: ICD-10-CM

## 2023-09-14 DIAGNOSIS — Z91.199 NONCOMPLIANCE WITH DIAGNOSTIC TESTING: ICD-10-CM

## 2023-09-14 DIAGNOSIS — F29 PSYCHOSIS, UNSPECIFIED PSYCHOSIS TYPE (MULTI): ICD-10-CM

## 2023-09-14 DIAGNOSIS — I25.10 CORONARY ARTERY DISEASE INVOLVING NATIVE HEART WITHOUT ANGINA PECTORIS, UNSPECIFIED VESSEL OR LESION TYPE: ICD-10-CM

## 2023-09-14 DIAGNOSIS — E11.42 DIABETIC POLYNEUROPATHY ASSOCIATED WITH TYPE 2 DIABETES MELLITUS (MULTI): ICD-10-CM

## 2023-09-14 DIAGNOSIS — M19.90 OSTEOARTHRITIS, UNSPECIFIED OSTEOARTHRITIS TYPE, UNSPECIFIED SITE: ICD-10-CM

## 2023-09-14 PROCEDURE — 99309 SBSQ NF CARE MODERATE MDM 30: CPT | Performed by: NURSE PRACTITIONER

## 2023-09-14 NOTE — LETTER
Patient: Carter Jefferson  : 1960    Encounter Date: 2023    Name: Carter Jefferson    YOB: 1960    Code Status: Full Code    Chief Complaint:   Follow up on DM 2; etc....    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     Diagnoses as follows:    Type 2 Diabetes; hypoglycemia:   On Metformin 500 mg PO BID & Glyburide 2.5 mg PO Q AM.   HA1c results:  21 HA1c 5.6 %   10/12/21 HA1c 5.5 %   5/3/2023: HA1c 5%  2023 Ha1c 5.3 %           Recent glucose on 8/3/23  labs low---44 mg/dL          Patient was recently reduced from Metformin 1000 mg PO BID to 500 mg PO BID.   Patient seen today he is in bed.  Typically the patient is usually in bed.   Calm, cooperative.  Mentation at baseline.  No complaints of chest pain, headaches or dizziness.    CAD:  On Aspirin.   No complaints of chest pain.          Paranoid Schizophrenia;unspecified psychosis; Major Depressive Disorder:   On Fluphenazine.  He is compliant with taking his medications.  Cooperative but sometimes irritable.  He is calm and cooperative during assessment today.  His mentation at baseline.  No agitation reported by staff.  Patient sleeps a lot.         Osteoarthritis:   On acetaminophen PRN for pain.   No complaints of pain today.  No reports of patient complaining of pain from staff.  Ambulates independently without any devices.  He does not  Recommend pelvic ultrasound. She can do yoga, exercise and balanced eating. Meditation, aromatherapy. Consider SSRI.   walk around frequently.  No recent falls reported.     Weight loss:  Recent weight trends:  3/3/23 158.6 #  4/2/23 145.8 #  5/3/23 145.8 #  6/2/23 145.2 #  7/5/23 143 #  8/3/23 148 #  9/1/23 145.8 #      Currently on regular diet, regular texture, thin consistency.       Diabetic neuropathy:  Not on any medications currently for neuropathy.  No complaints of neuropathy symptoms today.       Noncompliance with diagnostic testing:  Patient has been noncompliant with diagnostic testing.  He always refused blood work.  Patient recently refused blood work again in July.   Have discussed the importance of bloodwork with the patient.  He allowed blood work to be done recently a couple of times.    Generalized muscle weakness:  Patient is able to ambulate but usually is always in bed.  Does not ambulate very much.  No recent falls reported.          Reviewed  EMR  Reviewed medical, social, surgical and family history.  Reviewed all current medications and performed medication reconciliation.  Performed prescription drug management.  Reviewed vital signs AND lab results  Reviewed [facility] Documentation  Discussed patient with nursing.     ROS: 10 point ROS performed; Negative unless noted in HPI.      Surgical History  Problems    · No history of surgery    Family History  Mother    · No pertinent family history    Social History   · Current every day smoker   · Light cigarette smoker    · Lives in long-term care facility    · No illicit drug use   · Quit consuming alcohol in remote past    Allergies  NoKnown    · No Known Allergies      Outside Labs:  CBC: Date: 10/19/21, WBC: 11.6, Hgb: 12, Hct: 38.5, PLT: 310   CBC: Date: 10/12/21, WBC: 13.2, Hgb: 11.9, Hct: 38.2, PLT: 305   BMP: Date: 10/19/21, Na: 140, K: 4.2, Cl: 104, CO2: 26, BUN: 17, Cr: 1.0, Glu: 51, Ca: 9.7   BMP: Date: 10/12/21, Na: 140, K: 4.3, Cl: 103, CO2: 27, BUN: 17, Cr: 1.1, Glu: 80, Ca: 9.7   Hepatic Function Tests: Date: 10/12/21, AST: 12, ALT:  "12, Alk Phos: 44, T Bili: 0.3, Albumin: 4.0   Hepatic Function Tests: Date: 10/19/21, AST: 12, ALT: 11, Alk Phos: 40, T Bili: 0.3, Albumin: 3.9   11/18/20 HA1 C 5.6 %    10/12/21 HA1c 5.5 %.     BMP: Date: 5/3/2023 Na 141 K 4.2 Cr 0.9 BUN 17 glucose 49 Ca 9.6   CBC: Date: 5/3/2023 WBC 13.3 Hgb 11.3 hematocrit 36.5 platelets 323  Liver function: Date: 5/3/2023 ALT 7 AST 11 alkaline phos.  47 bilirubin 0.4 albumin 3.8 protein 8.2  Date: 5/3/2023: Lipid panel: Cholesterol 137; triglycerides 65; HDL 31; LDL 93.  Date 5/3/2023: HA1c 5%    RECENT LABS:  BMP: Date: 8/4/2023 Na 140 K 4.1 Cr 1.1 BUN 18 glucose 44 Ca 9.2 GFR 82  CBC: Date: 8/4/2023 WBC 10.6 Hgb 10.5 hematocrit 33.3 platelets 388  Liver function: Date: 8/4/2023 ALT 5 AST 11 alkaline phos.  57 bilirubin 0.3 albumin 3.7 protein 7.9    8/4/2023 hemoglobin A1c 5.3%    8/4/2023 vitamin D 25-hydroxy level 49.22 WNL          Lab Results   Component Value Date    WBC 17.2 (H) 11/11/2020    HGB 12.8 (L) 11/11/2020    HCT 41.9 11/11/2020     11/11/2020    ALT 12 11/11/2020    AST 14 11/11/2020     11/11/2020    K 3.7 11/11/2020     11/11/2020    CREATININE 0.96 11/11/2020    BUN 14 11/11/2020    CO2 27 11/11/2020    HGBA1C 5.6 11/11/2020             /83   Pulse 68   Temp 36.4 °C (97.6 °F)   Resp 18   Ht 1.753 m (5' 9\")   Wt 66.1 kg (145 lb 12.8 oz)   SpO2 97%   BMI 21.53 kg/m²      Physical Exam  Vitals and nursing note reviewed.   Constitutional:       General: He is awake.      Appearance: He is underweight.      Comments: Chronically ill   HENT:      Head: Normocephalic.      Right Ear: External ear normal.      Left Ear: External ear normal.      Nose: Nose normal.      Mouth/Throat:      Mouth: Mucous membranes are moist.      Pharynx: Oropharynx is clear.   Eyes:      Extraocular Movements: Extraocular movements intact.      Conjunctiva/sclera: Conjunctivae normal.      Pupils: Pupils are equal, round, and reactive to light. "   Cardiovascular:      Rate and Rhythm: Normal rate and regular rhythm.      Pulses: Normal pulses.      Heart sounds: Normal heart sounds.   Pulmonary:      Effort: Pulmonary effort is normal.      Breath sounds: Normal breath sounds.   Abdominal:      General: Bowel sounds are normal.      Palpations: Abdomen is soft.   Musculoskeletal:         General: Normal range of motion.      Cervical back: Normal range of motion and neck supple.   Skin:     General: Skin is warm and dry.   Neurological:      Mental Status: He is alert and oriented to person, place, and time. Mental status is at baseline.      Motor: Weakness present.   Psychiatric:         Mood and Affect: Mood normal. Affect is flat.         Behavior: Behavior is slowed. Behavior is not agitated or aggressive. Behavior is cooperative.         Judgment: Judgment is not inappropriate.          Assessment/Plan      Type 2 Diabetes; hypoglycemia:  Continue Metformin 500 mg PO BID.   Discontinue Glyburide 2.5 mg PO Q AM.   Monitor Ha1c Q 3 months.  ###Start to monitor accuchecks Q AM ROUTINELY for 2 weeks then Q AM once a week.  Start diabetic snack at bedtime.   If accuchecks remain WNL, will discontinue metformin 500 mg PO BID and start metformin 500 mg PO Q AM.    CAD:      Continue Aspirin.       Monitor for chest pain.          Paranoid Schizophrenia; unspecified psychosis; Major Depressive Disorder:   Continue Fluphenazine.  He is compliant with taking his medications.  Monitor for agitation, psychosis, and behaviors.      Osteoarthritis:   Continue acetaminophen PRN for pain.     Weight loss:      Monitor weights.     Dietary consult for weight loss.     Diabetic neuropathy:  Not on any medications currently for neuropathy.      Noncompliance with diagnostic testing:  Patient has been noncompliant with diagnostic testing.  Have discussed the importance of bloodwork with the patient.  He allowed blood work to be done recently a couple of  times.    Generalized muscle weakness:  Patient is able to ambulate but usually is always in bed.  Does not ambulate very much.  Fall prevention strategies.            Problem List Items Addressed This Visit          Cardiac and Vasculature    CAD (coronary artery disease)       Endocrine/Metabolic    Type 2 diabetes mellitus (CMS/HCC) - Primary       Mental Health    Chronic paranoid schizophrenia (CMS/HCC)    Major depressive disorder       Neuro    Diabetic neuropathy (CMS/HCC)     Other Visit Diagnoses       Hypoglycemia        Psychosis, unspecified psychosis type (CMS/HCC)        Osteoarthritis, unspecified osteoarthritis type, unspecified site        Weight loss        Noncompliance with diagnostic testing        Generalized muscle weakness            CHON Valentine       Electronically Signed By: CHON Valentine   9/17/23  9:01 PM

## 2023-09-17 VITALS
BODY MASS INDEX: 21.59 KG/M2 | SYSTOLIC BLOOD PRESSURE: 125 MMHG | WEIGHT: 145.8 LBS | HEIGHT: 69 IN | OXYGEN SATURATION: 97 % | TEMPERATURE: 97.6 F | HEART RATE: 68 BPM | DIASTOLIC BLOOD PRESSURE: 83 MMHG | RESPIRATION RATE: 18 BRPM

## 2023-10-12 ENCOUNTER — NURSING HOME VISIT (OUTPATIENT)
Dept: POST ACUTE CARE | Facility: EXTERNAL LOCATION | Age: 63
End: 2023-10-12
Payer: MEDICAID

## 2023-10-12 DIAGNOSIS — R63.4 WEIGHT LOSS: ICD-10-CM

## 2023-10-12 DIAGNOSIS — M62.81 GENERALIZED MUSCLE WEAKNESS: ICD-10-CM

## 2023-10-12 DIAGNOSIS — F29 PSYCHOSIS, UNSPECIFIED PSYCHOSIS TYPE (MULTI): ICD-10-CM

## 2023-10-12 DIAGNOSIS — Z91.199 NONCOMPLIANCE WITH DIAGNOSTIC TESTING: ICD-10-CM

## 2023-10-12 DIAGNOSIS — F20.0 CHRONIC PARANOID SCHIZOPHRENIA (MULTI): ICD-10-CM

## 2023-10-12 DIAGNOSIS — E11.42 DIABETIC POLYNEUROPATHY ASSOCIATED WITH TYPE 2 DIABETES MELLITUS (MULTI): ICD-10-CM

## 2023-10-12 DIAGNOSIS — I25.10 CORONARY ARTERY DISEASE INVOLVING NATIVE HEART WITHOUT ANGINA PECTORIS, UNSPECIFIED VESSEL OR LESION TYPE: ICD-10-CM

## 2023-10-12 DIAGNOSIS — M19.90 OSTEOARTHRITIS, UNSPECIFIED OSTEOARTHRITIS TYPE, UNSPECIFIED SITE: ICD-10-CM

## 2023-10-12 DIAGNOSIS — F33.9 RECURRENT MAJOR DEPRESSIVE DISORDER, REMISSION STATUS UNSPECIFIED (CMS-HCC): ICD-10-CM

## 2023-10-12 DIAGNOSIS — F20.89 OTHER SCHIZOPHRENIA (MULTI): ICD-10-CM

## 2023-10-12 DIAGNOSIS — E11.9 TYPE 2 DIABETES MELLITUS WITHOUT COMPLICATION, WITHOUT LONG-TERM CURRENT USE OF INSULIN (MULTI): Primary | ICD-10-CM

## 2023-10-12 PROCEDURE — 99309 SBSQ NF CARE MODERATE MDM 30: CPT | Performed by: NURSE PRACTITIONER

## 2023-10-12 NOTE — LETTER
Patient: Carter Jefferson  : 1960    Encounter Date: 10/12/2023    Name: Carter Jefferson    YOB: 1960    Code Status: Full Code    Chief Complaint:   Follow up on DM 2; etc....    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     Diagnoses as follows:    Type 2 Diabetes:   Curently on Metformin 500 mg PO BID.  Glyburide 2.5 mg was recently discontinued.    HA1c results:  21 HA1c 5.6 %   10/12/21 HA1c 5.5 %   5/3/2023: HA1c 5%  2023 Ha1c 5.3 %  No hypoglycemia reported.           Patient was recently reduced from Metformin 1000 mg PO BID to 500 mg PO BID.           Recent accuchecks: 127 mg/dL, 136 mg/dL, 113 mg/dL.  Patient seen today he is in bed.  Typically the patient is usually in bed.   Calm, cooperative.  Mentation at baseline.  No complaints of chest pain, headaches or dizziness.    Weight loss:  Recent weight trends:  3/3/23 158.6 #  23 145.8 #  5/3/23 145.8 #  23 145.2 #  23 143 #  8/3/23 148 #  23 145.8 #  10/12/23 151.8 #  Slightly weight gain.      Currently on regular diet, regular texture, thin consistency.         Paranoid Schizophrenia;unspecified psychosis; Major Depressive Disorder:   On Fluphenazine.  Patient is compliant with taking his medications.  He is cooperative most of the time.  He is calm and cooperative today.  His mentation is at baseline.  No recent agitation  reported by staff.  Patient generally sleeps a lot.         Diabetic neuropathy:  Not on any medications currently for neuropathy.  No complaints of neuropathy symptoms today.         CAD:      On Aspirin.       No complaints of chest pain.          Generalized muscle weakness:  Patient is able to ambulate but usually prefers to stay in bed.  Does not ambulate very much.  No recent falls reported.     Osteoarthritis:   On acetaminophen PRN for pain.   No complaints of pain today.  No reports of patient complaining of pain from staff.  Ambulates independently without any devices.  He does not walk around frequently.  No recent falls reported.     Noncompliance with diagnostic testing:  Patient has been noncompliant with diagnostic testing.  He usually refuses blood work.  Patient recently refused blood work again in July.   Have discussed the importance of bloodwork with the patient.  He allowed blood work to be done recently in August, 2023.               Reviewed  EMR  Reviewed medical, social, surgical and family history.  Reviewed all current medications and performed medication reconciliation.  Performed prescription drug management.  Reviewed vital signs AND lab results  Reviewed [facility] Documentation  Discussed patient with nursing.     ROS: 10 point ROS performed; Negative unless noted in HPI.      Surgical History  Problems    · No history of surgery    Family History  Mother    · No pertinent family history    Social History   · Current every day smoker   · Light cigarette smoker    · Lives in long-term care facility    · No illicit drug use   · Quit consuming alcohol in remote past    Allergies  NoKnown    · No Known Allergies      Outside Labs:  CBC: Date: 10/19/21, WBC: 11.6, Hgb: 12, Hct: 38.5, PLT: 310   CBC: Date: 10/12/21, WBC: 13.2, Hgb: 11.9, Hct: 38.2, PLT: 305   BMP: Date: 10/19/21, Na: 140, K: 4.2, Cl: 104, CO2: 26, BUN: 17, Cr: 1.0, Glu: 51, Ca: 9.7   BMP: Date: 10/12/21, Na: 140,  "K: 4.3, Cl: 103, CO2: 27, BUN: 17, Cr: 1.1, Glu: 80, Ca: 9.7   Hepatic Function Tests: Date: 10/12/21, AST: 12, ALT: 12, Alk Phos: 44, T Bili: 0.3, Albumin: 4.0   Hepatic Function Tests: Date: 10/19/21, AST: 12, ALT: 11, Alk Phos: 40, T Bili: 0.3, Albumin: 3.9   11/18/20 HA1 C 5.6 %    10/12/21 HA1c 5.5 %.     BMP: Date: 5/3/2023 Na 141 K 4.2 Cr 0.9 BUN 17 glucose 49 Ca 9.6   CBC: Date: 5/3/2023 WBC 13.3 Hgb 11.3 hematocrit 36.5 platelets 323  Liver function: Date: 5/3/2023 ALT 7 AST 11 alkaline phos.  47 bilirubin 0.4 albumin 3.8 protein 8.2  Date: 5/3/2023: Lipid panel: Cholesterol 137; triglycerides 65; HDL 31; LDL 93.  Date 5/3/2023: HA1c 5%    RECENT LABS:  BMP: Date: 8/4/2023 Na 140 K 4.1 Cr 1.1 BUN 18 glucose 44 Ca 9.2 GFR 82  CBC: Date: 8/4/2023 WBC 10.6 Hgb 10.5 hematocrit 33.3 platelets 388  Liver function: Date: 8/4/2023 ALT 5 AST 11 alkaline phos.  57 bilirubin 0.3 albumin 3.7 protein 7.9    8/4/2023 hemoglobin A1c 5.3%    8/4/2023 vitamin D 25-hydroxy level 49.22 WNL          Lab Results   Component Value Date    WBC 17.2 (H) 11/11/2020    HGB 12.8 (L) 11/11/2020    HCT 41.9 11/11/2020     11/11/2020    ALT 12 11/11/2020    AST 14 11/11/2020     11/11/2020    K 3.7 11/11/2020     11/11/2020    CREATININE 0.96 11/11/2020    BUN 14 11/11/2020    CO2 27 11/11/2020    HGBA1C 5.6 11/11/2020             /78   Pulse 88   Temp 36.9 °C (98.5 °F)   Resp 18   Ht 1.753 m (5' 9\")   Wt 68.9 kg (151 lb 12.8 oz)   SpO2 98%   BMI 22.42 kg/m²      Physical Exam  Vitals and nursing note reviewed.   Constitutional:       General: He is awake.      Appearance: He is underweight.      Comments: Chronically ill   HENT:      Head: Normocephalic.      Right Ear: External ear normal.      Left Ear: External ear normal.      Nose: Nose normal.      Mouth/Throat:      Mouth: Mucous membranes are moist.      Pharynx: Oropharynx is clear.   Eyes:      Extraocular Movements: Extraocular " movements intact.      Conjunctiva/sclera: Conjunctivae normal.      Pupils: Pupils are equal, round, and reactive to light.   Cardiovascular:      Rate and Rhythm: Normal rate and regular rhythm.      Pulses: Normal pulses.      Heart sounds: Normal heart sounds.   Pulmonary:      Effort: Pulmonary effort is normal.      Breath sounds: Normal breath sounds.   Abdominal:      General: Bowel sounds are normal.      Palpations: Abdomen is soft.   Musculoskeletal:         General: Normal range of motion.      Cervical back: Normal range of motion and neck supple.   Skin:     General: Skin is warm and dry.   Neurological:      Mental Status: He is alert and oriented to person, place, and time. Mental status is at baseline.      Motor: Weakness present.   Psychiatric:         Mood and Affect: Mood normal. Affect is flat.         Behavior: Behavior is slowed. Behavior is not agitated or aggressive. Behavior is cooperative.         Judgment: Judgment is not inappropriate.          Assessment/Plan    Ordered BMP, CBC with diff. Ha1c for the 1st Tuesday in November, 2023.       Type 2 Diabetes:  Discontinue Metformin 500 mg PO BID.  Start Metformin 500 mg PO Q AM.           Monitor Ha1c Q 3 months.  Continue to monitor accuchecks Q AM.   Continue diabetic snack at bedtime.           Order Ha1c for the 1st Tuesday in November.     Weight loss:      Monitor weights.     Dietary consult for weight loss.                Paranoid Schizophrenia; unspecified psychosis; Major Depressive Disorder:   Continue Fluphenazine.  He is compliant with taking his medications.  Monitor for agitation, psychosis, and behaviors.     Diabetic neuropathy:  Not on any medications currently for neuropathy.    CAD:      Continue Aspirin.       Monitor for chest pain.                Osteoarthritis:   Continue acetaminophen PRN for pain.                 Generalized muscle weakness:  Patient is able to ambulate but usually is always in bed.  Does not  ambulate very much.  Fall prevention strategies.     Noncompliance with diagnostic testing:  Patient has been noncompliant with diagnostic testing.  Have discussed the importance of bloodwork with the patient.  He allowed blood work to be done recently a couple of times.               Problem List Items Addressed This Visit          Cardiac and Vasculature    CAD (coronary artery disease)       Endocrine/Metabolic    Type 2 diabetes mellitus (CMS/HCC) - Primary       Mental Health    Chronic paranoid schizophrenia (CMS/HCC)    Major depressive disorder    Schizophrenia (CMS/HCC)       Neuro    Diabetic neuropathy (CMS/[facility])     Other Visit Diagnoses       Weight loss        Psychosis, unspecified psychosis type (CMS/HCC)        Osteoarthritis, unspecified osteoarthritis type, unspecified site        Generalized muscle weakness        Noncompliance with diagnostic testing            CHON Valentine       Electronically Signed By: CHON Valentine   10/18/23  3:41 PM

## 2023-10-18 VITALS
BODY MASS INDEX: 22.48 KG/M2 | RESPIRATION RATE: 18 BRPM | HEART RATE: 88 BPM | WEIGHT: 151.8 LBS | HEIGHT: 69 IN | OXYGEN SATURATION: 98 % | DIASTOLIC BLOOD PRESSURE: 78 MMHG | SYSTOLIC BLOOD PRESSURE: 140 MMHG | TEMPERATURE: 98.5 F

## 2023-10-18 NOTE — PROGRESS NOTES
Name: Carter Jefferson    YOB: 1960    Code Status: Full Code    Chief Complaint:   Follow up on DM 2; etc....    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     Diagnoses as follows:    Type 2 Diabetes:   Curently on Metformin 500 mg PO BID.  Glyburide 2.5 mg was recently discontinued.    HA1c results:  11/18/21 HA1c 5.6 %   10/12/21 HA1c 5.5 %   5/3/2023: HA1c 5%  8/4/2023 Ha1c 5.3 %  No hypoglycemia reported.           Patient was recently reduced from Metformin 1000 mg PO BID to 500 mg PO BID.           Recent accuchecks: 127 mg/dL, 136 mg/dL, 113 mg/dL.  Patient seen today he is in bed.  Typically the patient is usually in bed.   Calm, cooperative.  Mentation at baseline.  No complaints of chest pain, headaches or dizziness.    Weight loss:  Recent weight trends:  3/3/23 158.6 #  4/2/23 145.8 #  5/3/23 145.8 #  6/2/23 145.2 #  7/5/23 143 #  8/3/23 148 #  9/1/23 145.8 #  10/12/23 151.8 #  Slightly weight gain.      Currently on regular diet, regular texture, thin consistency.         Paranoid Schizophrenia;unspecified psychosis; Major Depressive Disorder:   On Fluphenazine.  Patient is compliant with taking his medications.  He is cooperative most of the time.  He is calm and cooperative today.  His mentation is at baseline.  No recent agitation reported by staff.  Patient generally sleeps a lot.         Diabetic  neuropathy:  Not on any medications currently for neuropathy.  No complaints of neuropathy symptoms today.         CAD:      On Aspirin.       No complaints of chest pain.          Generalized muscle weakness:  Patient is able to ambulate but usually prefers to stay in bed.  Does not ambulate very much.  No recent falls reported.     Osteoarthritis:   On acetaminophen PRN for pain.   No complaints of pain today.  No reports of patient complaining of pain from staff.  Ambulates independently without any devices.  He does not walk around frequently.  No recent falls reported.     Noncompliance with diagnostic testing:  Patient has been noncompliant with diagnostic testing.  He usually refuses blood work.  Patient recently refused blood work again in July.   Have discussed the importance of bloodwork with the patient.  He allowed blood work to be done recently in August, 2023.               Reviewed  EMR  Reviewed medical, social, surgical and family history.  Reviewed all current medications and performed medication reconciliation.  Performed prescription drug management.  Reviewed vital signs AND lab results  Reviewed [facility] Documentation  Discussed patient with nursing.     ROS: 10 point ROS performed; Negative unless noted in HPI.      Surgical History  Problems    · No history of surgery    Family History  Mother    · No pertinent family history    Social History   · Current every day smoker   · Light cigarette smoker    · Lives in long-term care facility    · No illicit drug use   · Quit consuming alcohol in remote past    Allergies  NoKnown    · No Known Allergies      Outside Labs:  CBC: Date: 10/19/21, WBC: 11.6, Hgb: 12, Hct: 38.5, PLT: 310   CBC: Date: 10/12/21, WBC: 13.2, Hgb: 11.9, Hct: 38.2, PLT: 305   BMP: Date: 10/19/21, Na: 140, K: 4.2, Cl: 104, CO2: 26, BUN: 17, Cr: 1.0, Glu: 51, Ca: 9.7   BMP: Date: 10/12/21, Na: 140, K: 4.3, Cl: 103, CO2: 27, BUN: 17, Cr: 1.1, Glu: 80, Ca: 9.7   Hepatic  "Function Tests: Date: 10/12/21, AST: 12, ALT: 12, Alk Phos: 44, T Bili: 0.3, Albumin: 4.0   Hepatic Function Tests: Date: 10/19/21, AST: 12, ALT: 11, Alk Phos: 40, T Bili: 0.3, Albumin: 3.9   11/18/20 HA1 C 5.6 %    10/12/21 HA1c 5.5 %.     BMP: Date: 5/3/2023 Na 141 K 4.2 Cr 0.9 BUN 17 glucose 49 Ca 9.6   CBC: Date: 5/3/2023 WBC 13.3 Hgb 11.3 hematocrit 36.5 platelets 323  Liver function: Date: 5/3/2023 ALT 7 AST 11 alkaline phos.  47 bilirubin 0.4 albumin 3.8 protein 8.2  Date: 5/3/2023: Lipid panel: Cholesterol 137; triglycerides 65; HDL 31; LDL 93.  Date 5/3/2023: HA1c 5%    RECENT LABS:  BMP: Date: 8/4/2023 Na 140 K 4.1 Cr 1.1 BUN 18 glucose 44 Ca 9.2 GFR 82  CBC: Date: 8/4/2023 WBC 10.6 Hgb 10.5 hematocrit 33.3 platelets 388  Liver function: Date: 8/4/2023 ALT 5 AST 11 alkaline phos.  57 bilirubin 0.3 albumin 3.7 protein 7.9    8/4/2023 hemoglobin A1c 5.3%    8/4/2023 vitamin D 25-hydroxy level 49.22 WNL          Lab Results   Component Value Date    WBC 17.2 (H) 11/11/2020    HGB 12.8 (L) 11/11/2020    HCT 41.9 11/11/2020     11/11/2020    ALT 12 11/11/2020    AST 14 11/11/2020     11/11/2020    K 3.7 11/11/2020     11/11/2020    CREATININE 0.96 11/11/2020    BUN 14 11/11/2020    CO2 27 11/11/2020    HGBA1C 5.6 11/11/2020             /78   Pulse 88   Temp 36.9 °C (98.5 °F)   Resp 18   Ht 1.753 m (5' 9\")   Wt 68.9 kg (151 lb 12.8 oz)   SpO2 98%   BMI 22.42 kg/m²      Physical Exam  Vitals and nursing note reviewed.   Constitutional:       General: He is awake.      Appearance: He is underweight.      Comments: Chronically ill   HENT:      Head: Normocephalic.      Right Ear: External ear normal.      Left Ear: External ear normal.      Nose: Nose normal.      Mouth/Throat:      Mouth: Mucous membranes are moist.      Pharynx: Oropharynx is clear.   Eyes:      Extraocular Movements: Extraocular movements intact.      Conjunctiva/sclera: Conjunctivae normal.      Pupils: " Pupils are equal, round, and reactive to light.   Cardiovascular:      Rate and Rhythm: Normal rate and regular rhythm.      Pulses: Normal pulses.      Heart sounds: Normal heart sounds.   Pulmonary:      Effort: Pulmonary effort is normal.      Breath sounds: Normal breath sounds.   Abdominal:      General: Bowel sounds are normal.      Palpations: Abdomen is soft.   Musculoskeletal:         General: Normal range of motion.      Cervical back: Normal range of motion and neck supple.   Skin:     General: Skin is warm and dry.   Neurological:      Mental Status: He is alert and oriented to person, place, and time. Mental status is at baseline.      Motor: Weakness present.   Psychiatric:         Mood and Affect: Mood normal. Affect is flat.         Behavior: Behavior is slowed. Behavior is not agitated or aggressive. Behavior is cooperative.         Judgment: Judgment is not inappropriate.          Assessment/Plan     Ordered BMP, CBC with diff. Ha1c for the 1st Tuesday in November, 2023.       Type 2 Diabetes:  Discontinue Metformin 500 mg PO BID.  Start Metformin 500 mg PO Q AM.           Monitor Ha1c Q 3 months.  Continue to monitor accuchecks Q AM.   Continue diabetic snack at bedtime.           Order Ha1c for the 1st Tuesday in November.     Weight loss:      Monitor weights.     Dietary consult for weight loss.                Paranoid Schizophrenia; unspecified psychosis; Major Depressive Disorder:   Continue Fluphenazine.  He is compliant with taking his medications.  Monitor for agitation, psychosis, and behaviors.     Diabetic neuropathy:  Not on any medications currently for neuropathy.    CAD:      Continue Aspirin.       Monitor for chest pain.                Osteoarthritis:   Continue acetaminophen PRN for pain.                 Generalized muscle weakness:  Patient is able to ambulate but usually is always in bed.  Does not ambulate very much.  Fall prevention strategies.     Noncompliance with  diagnostic testing:  Patient has been noncompliant with diagnostic testing.  Have discussed the importance of bloodwork with the patient.  He allowed blood work to be done recently a couple of times.               Problem List Items Addressed This Visit          Cardiac and Vasculature    CAD (coronary artery disease)       Endocrine/Metabolic    Type 2 diabetes mellitus (CMS/HCC) - Primary       Mental Health    Chronic paranoid schizophrenia (CMS/HCC)    Major depressive disorder    Schizophrenia (CMS/HCC)       Neuro    Diabetic neuropathy (CMS/HCC)     Other Visit Diagnoses       Weight loss        Psychosis, unspecified psychosis type (CMS/HCC)        Osteoarthritis, unspecified osteoarthritis type, unspecified site        Generalized muscle weakness        Noncompliance with diagnostic testing            Bethany Jones, APRN-CNP

## 2023-11-07 ENCOUNTER — NURSING HOME VISIT (OUTPATIENT)
Dept: POST ACUTE CARE | Facility: EXTERNAL LOCATION | Age: 63
End: 2023-11-07
Payer: MEDICAID

## 2023-11-07 DIAGNOSIS — E13.42 DIABETIC POLYNEUROPATHY ASSOCIATED WITH OTHER SPECIFIED DIABETES MELLITUS (MULTI): ICD-10-CM

## 2023-11-07 DIAGNOSIS — F29 PSYCHOSIS, UNSPECIFIED PSYCHOSIS TYPE (MULTI): ICD-10-CM

## 2023-11-07 DIAGNOSIS — F33.9 RECURRENT MAJOR DEPRESSIVE DISORDER, REMISSION STATUS UNSPECIFIED (CMS-HCC): ICD-10-CM

## 2023-11-07 DIAGNOSIS — M19.90 OSTEOARTHRITIS, UNSPECIFIED OSTEOARTHRITIS TYPE, UNSPECIFIED SITE: ICD-10-CM

## 2023-11-07 DIAGNOSIS — R68.89 FLUCTUATION OF WEIGHT: ICD-10-CM

## 2023-11-07 DIAGNOSIS — F20.89 OTHER SCHIZOPHRENIA (MULTI): ICD-10-CM

## 2023-11-07 DIAGNOSIS — I25.10 CORONARY ARTERY DISEASE INVOLVING NATIVE HEART WITHOUT ANGINA PECTORIS, UNSPECIFIED VESSEL OR LESION TYPE: ICD-10-CM

## 2023-11-07 DIAGNOSIS — M62.81 GENERALIZED MUSCLE WEAKNESS: ICD-10-CM

## 2023-11-07 DIAGNOSIS — E11.9 TYPE 2 DIABETES MELLITUS WITHOUT COMPLICATION, WITHOUT LONG-TERM CURRENT USE OF INSULIN (MULTI): Primary | ICD-10-CM

## 2023-11-07 PROCEDURE — 99309 SBSQ NF CARE MODERATE MDM 30: CPT | Performed by: NURSE PRACTITIONER

## 2023-11-07 NOTE — LETTER
Patient: Carter Jefferson  : 1960    Encounter Date: 2023    Name: Carter Jefferson    YOB: 1960    Code Status: Full Code    Chief Complaint:   Follow up on DM 2; etc....    HPI       63 year old male at [facility] in [facility].  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     Diagnoses as follows:    Type 2 Diabetes:   On Metformin 500 mg PO every day.  Glyburide 2.5 mg was recently discontinued and metformin was decreased to 500 mg PO every day.  However, now Ha1c is increasing.   HA1c results:  21 HA1c 5.6 %   10/12/21 HA1c 5.5 %   5/3/2023: HA1c 5%  2023 Ha1c 5.3 %  2023: Hemoglobin A1c 6.1%  No hypoglycemia reported.          Recent accuchecks: 120 mg/dL, 137 mg/dL, 118 mg/dL.  Patient seen today he is in bed.  Calm, cooperative.  Mentation at baseline.  No complaints of chest pain, headaches or dizziness.    Paranoid Schizophrenia;unspecified psychosis; Major Depressive Disorder:   On Fluphenazine.  Patient is compliant with taking his medications.  He is cooperative most of the time.  He is calm and cooperative today.  His mentation is at baseline.  No recent agitation reported by staff.  Patient generally sleeps a lot.      CAD:      On Aspirin.       No complaints of chest pain.    Weight fluctuation:  Recent weight trends:  3/3/23 158.6 #  23 145.8 #  5/3/23 145.8  #  6/2/23 145.2 #  7/5/23 143 #  8/3/23 148 #  9/1/23 145.8 #  10/12/23 151.8 #  11/4/23 151#      Currently on regular diet, regular texture, thin consistency.               Diabetic neuropathy:  Not on any medications currently for neuropathy.  No complaints of neuropathy symptoms today.     Generalized muscle weakness:  Patient is able to ambulate but usually prefers to stay in bed.  Does not ambulate very much.  No recent falls reported.     Osteoarthritis:   On acetaminophen PRN for pain.   No complaints of pain today.  No reports of patient complaining of pain from staff.  Ambulates independently without any devices.  He does not walk around frequently.  No recent falls reported.                Reviewed  EMR  Reviewed medical, social, surgical and family history.  Reviewed all current medications and performed medication reconciliation.  Performed prescription drug management.  Reviewed vital signs AND lab results  Reviewed [facility] Documentation  Discussed patient with nursing.     ROS: 10 point ROS performed; Negative unless noted in HPI.      Surgical History  Problems    · No history of surgery    Family History  Mother    · No pertinent family history    Social History   · Current every day smoker   · Light cigarette smoker    · Lives in long-term care facility    · No illicit drug use   · Quit consuming alcohol in remote past    Allergies  NoKnown    · No Known Allergies      Outside Labs:  CBC: Date: 10/19/21, WBC: 11.6, Hgb: 12, Hct: 38.5, PLT: 310   CBC: Date: 10/12/21, WBC: 13.2, Hgb: 11.9, Hct: 38.2, PLT: 305   BMP: Date: 10/19/21, Na: 140, K: 4.2, Cl: 104, CO2: 26, BUN: 17, Cr: 1.0, Glu: 51, Ca: 9.7   BMP: Date: 10/12/21, Na: 140, K: 4.3, Cl: 103, CO2: 27, BUN: 17, Cr: 1.1, Glu: 80, Ca: 9.7   Hepatic Function Tests: Date: 10/12/21, AST: 12, ALT: 12, Alk Phos: 44, T Bili: 0.3, Albumin: 4.0   Hepatic Function Tests: Date: 10/19/21, AST: 12, ALT: 11, Alk Phos: 40, T Bili: 0.3, Albumin: 3.9  "  11/18/20 HA1 C 5.6 %    10/12/21 HA1c 5.5 %.     BMP: Date: 5/3/2023 Na 141 K 4.2 Cr 0.9 BUN 17 glucose 49 Ca 9.6   CBC: Date: 5/3/2023 WBC 13.3 Hgb 11.3 hematocrit 36.5 platelets 323  Liver function: Date: 5/3/2023 ALT 7 AST 11 alkaline phos.  47 bilirubin 0.4 albumin 3.8 protein 8.2  Date: 5/3/2023: Lipid panel: Cholesterol 137; triglycerides 65; HDL 31; LDL 93.  Date 5/3/2023: HA1c 5%    BMP: Date: 8/4/2023 Na 140 K 4.1 Cr 1.1 BUN 18 glucose 44 Ca 9.2 GFR 82  CBC: Date: 8/4/2023 WBC 10.6 Hgb 10.5 hematocrit 33.3 platelets 388  Liver function: Date: 8/4/2023 ALT 5 AST 11 alkaline phos.  57 bilirubin 0.3 albumin 3.7 protein 7.9    8/4/2023 hemoglobin A1c 5.3%    8/4/2023 vitamin D 25-hydroxy level 49.22 WNL    BMP: Date: 11/7/2023 Na 140 K 4.7 Cr 1.1 BUN 18 glucose 96 Ca 9.4 GFR 82  CBC: Date: 11/7/2023 WBC 10.1 Hgb 11 hematocrit 35.4 platelets 278    11/7/2023: Hemoglobin A1c 6.1%                Lab Results   Component Value Date    WBC 17.2 (H) 11/11/2020    HGB 12.8 (L) 11/11/2020    HCT 41.9 11/11/2020     11/11/2020    ALT 12 11/11/2020    AST 14 11/11/2020     11/11/2020    K 3.7 11/11/2020     11/11/2020    CREATININE 0.96 11/11/2020    BUN 14 11/11/2020    CO2 27 11/11/2020    HGBA1C 5.6 11/11/2020             /68   Pulse 88   Temp 36.8 °C (98.2 °F)   Resp 18   Ht 1.753 m (5' 9\")   Wt 68.5 kg (151 lb)   SpO2 97%   BMI 22.30 kg/m²      Physical Exam  Vitals and nursing note reviewed.   Constitutional:       General: He is awake.      Appearance: He is underweight.      Comments: Chronically ill   HENT:      Head: Normocephalic.      Right Ear: External ear normal.      Left Ear: External ear normal.      Nose: Nose normal.      Mouth/Throat:      Mouth: Mucous membranes are moist.      Pharynx: Oropharynx is clear.   Eyes:      Extraocular Movements: Extraocular movements intact.      Conjunctiva/sclera: Conjunctivae normal.      Pupils: Pupils are equal, round, and " reactive to light.   Cardiovascular:      Rate and Rhythm: Normal rate and regular rhythm.      Pulses: Normal pulses.      Heart sounds: Normal heart sounds.   Pulmonary:      Effort: Pulmonary effort is normal.      Breath sounds: Normal breath sounds.   Abdominal:      General: Bowel sounds are normal.      Palpations: Abdomen is soft.   Musculoskeletal:         General: Normal range of motion.      Cervical back: Normal range of motion and neck supple.   Skin:     General: Skin is warm and dry.   Neurological:      Mental Status: He is alert and oriented to person, place, and time. Mental status is at baseline.      Motor: Weakness present.   Psychiatric:         Mood and Affect: Mood normal. Affect is flat.         Behavior: Behavior is slowed. Behavior is not agitated or aggressive. Behavior is cooperative.         Judgment: Judgment is not inappropriate.          Assessment/Plan            Type 2 Diabetes:  Discontinue Metformin 500 mg PO QD  Start Metformin 500 mg PO BID.           Monitor Ha1c Q 3 months.  Continue to monitor accuchecks Q AM.   Continue diabetic snack at bedtime.             Paranoid Schizophrenia;unspecified psychosis; Major Depressive Disorder:           Continue Fluphenazine.          Follow up with in house psych.            CAD:          Continue  Aspirin.           Monitor for chest pain.           Weight fluctuation:          Monitor weights.            Diabetic neuropathy:  Not on any medications currently for neuropathy.             Generalized muscle weakness:  Patient is able to ambulate but usually prefers to stay in bed.  Does not ambulate very much.  Fall prevention strategies.    Osteoarthritis:   Continue acetaminophen PRN for pain.        Problem List Items Addressed This Visit          Cardiac and Vasculature    CAD (coronary artery disease)       Endocrine/Metabolic    Type 2 diabetes mellitus (CMS/HCC) - Primary       Mental Health    Major depressive disorder     Schizophrenia (CMS/[facility])       Neuro    Diabetic neuropathy (CMS/[facility])     Other Visit Diagnoses       Psychosis, unspecified psychosis type (CMS/[facility])        Fluctuation of weight        Osteoarthritis, unspecified osteoarthritis type, unspecified site        Generalized muscle weakness            CHON Valentine       Electronically Signed By: CHON Valentine   11/11/23 11:53 PM

## 2023-11-11 VITALS
TEMPERATURE: 98.2 F | RESPIRATION RATE: 18 BRPM | HEART RATE: 88 BPM | WEIGHT: 151 LBS | SYSTOLIC BLOOD PRESSURE: 109 MMHG | DIASTOLIC BLOOD PRESSURE: 68 MMHG | BODY MASS INDEX: 22.36 KG/M2 | HEIGHT: 69 IN | OXYGEN SATURATION: 97 %

## 2023-11-12 NOTE — PROGRESS NOTES
Name: Carter Jefferson    YOB: 1960    Code Status: Full Code    Chief Complaint:   Follow up on DM 2; etc....    HPI       63 year old male at [facility] in Long Term [facility].  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     Diagnoses as follows:    Type 2 Diabetes:   On Metformin 500 mg PO every day.  Glyburide 2.5 mg was recently discontinued and metformin was decreased to 500 mg PO every day.  However, now Ha1c is increasing.   HA1c results:  11/18/21 HA1c 5.6 %   10/12/21 HA1c 5.5 %   5/3/2023: HA1c 5%  8/4/2023 Ha1c 5.3 %  11/7/2023: Hemoglobin A1c 6.1%  No hypoglycemia reported.          Recent accuchecks: 120 mg/dL, 137 mg/dL, 118 mg/dL.  Patient seen today he is in bed.  Calm, cooperative.  Mentation at baseline.  No complaints of chest pain, headaches or dizziness.    Paranoid Schizophrenia;unspecified psychosis; Major Depressive Disorder:   On Fluphenazine.  Patient is compliant with taking his medications.  He is cooperative most of the time.  He is calm and cooperative today.  His mentation is at baseline.  No recent agitation reported by staff.  Patient generally sleeps a lot.      CAD:      On Aspirin.       No complaints of chest pain.    Weight fluctuation:  Recent weight trends:  3/3/23 158.6 #  4/2/23 145.8 #  5/3/23 145.8 #  6/2/23 145.2 #  7/5/23 143 #  8/3/23 148 #  9/1/23 145.8 #  10/12/23  151.8 #  11/4/23 151#      Currently on regular diet, regular texture, thin consistency.               Diabetic neuropathy:  Not on any medications currently for neuropathy.  No complaints of neuropathy symptoms today.     Generalized muscle weakness:  Patient is able to ambulate but usually prefers to stay in bed.  Does not ambulate very much.  No recent falls reported.     Osteoarthritis:   On acetaminophen PRN for pain.   No complaints of pain today.  No reports of patient complaining of pain from staff.  Ambulates independently without any devices.  He does not walk around frequently.  No recent falls reported.                Reviewed  EMR  Reviewed medical, social, surgical and family history.  Reviewed all current medications and performed medication reconciliation.  Performed prescription drug management.  Reviewed vital signs AND lab results  Reviewed [facility] Documentation  Discussed patient with nursing.     ROS: 10 point ROS performed; Negative unless noted in HPI.      Surgical History  Problems    · No history of surgery    Family History  Mother    · No pertinent family history    Social History   · Current every day smoker   · Light cigarette smoker    · Lives in long-term care facility    · No illicit drug use   · Quit consuming alcohol in remote past    Allergies  NoKnown    · No Known Allergies      Outside Labs:  CBC: Date: 10/19/21, WBC: 11.6, Hgb: 12, Hct: 38.5, PLT: 310   CBC: Date: 10/12/21, WBC: 13.2, Hgb: 11.9, Hct: 38.2, PLT: 305   BMP: Date: 10/19/21, Na: 140, K: 4.2, Cl: 104, CO2: 26, BUN: 17, Cr: 1.0, Glu: 51, Ca: 9.7   BMP: Date: 10/12/21, Na: 140, K: 4.3, Cl: 103, CO2: 27, BUN: 17, Cr: 1.1, Glu: 80, Ca: 9.7   Hepatic Function Tests: Date: 10/12/21, AST: 12, ALT: 12, Alk Phos: 44, T Bili: 0.3, Albumin: 4.0   Hepatic Function Tests: Date: 10/19/21, AST: 12, ALT: 11, Alk Phos: 40, T Bili: 0.3, Albumin: 3.9   11/18/20 HA1 C 5.6 %    10/12/21 HA1c 5.5 %.     BMP: Date: 5/3/2023  "Na 141 K 4.2 Cr 0.9 BUN 17 glucose 49 Ca 9.6   CBC: Date: 5/3/2023 WBC 13.3 Hgb 11.3 hematocrit 36.5 platelets 323  Liver function: Date: 5/3/2023 ALT 7 AST 11 alkaline phos.  47 bilirubin 0.4 albumin 3.8 protein 8.2  Date: 5/3/2023: Lipid panel: Cholesterol 137; triglycerides 65; HDL 31; LDL 93.  Date 5/3/2023: HA1c 5%    BMP: Date: 8/4/2023 Na 140 K 4.1 Cr 1.1 BUN 18 glucose 44 Ca 9.2 GFR 82  CBC: Date: 8/4/2023 WBC 10.6 Hgb 10.5 hematocrit 33.3 platelets 388  Liver function: Date: 8/4/2023 ALT 5 AST 11 alkaline phos.  57 bilirubin 0.3 albumin 3.7 protein 7.9    8/4/2023 hemoglobin A1c 5.3%    8/4/2023 vitamin D 25-hydroxy level 49.22 WNL    BMP: Date: 11/7/2023 Na 140 K 4.7 Cr 1.1 BUN 18 glucose 96 Ca 9.4 GFR 82  CBC: Date: 11/7/2023 WBC 10.1 Hgb 11 hematocrit 35.4 platelets 278    11/7/2023: Hemoglobin A1c 6.1%                Lab Results   Component Value Date    WBC 17.2 (H) 11/11/2020    HGB 12.8 (L) 11/11/2020    HCT 41.9 11/11/2020     11/11/2020    ALT 12 11/11/2020    AST 14 11/11/2020     11/11/2020    K 3.7 11/11/2020     11/11/2020    CREATININE 0.96 11/11/2020    BUN 14 11/11/2020    CO2 27 11/11/2020    HGBA1C 5.6 11/11/2020             /68   Pulse 88   Temp 36.8 °C (98.2 °F)   Resp 18   Ht 1.753 m (5' 9\")   Wt 68.5 kg (151 lb)   SpO2 97%   BMI 22.30 kg/m²      Physical Exam  Vitals and nursing note reviewed.   Constitutional:       General: He is awake.      Appearance: He is underweight.      Comments: Chronically ill   HENT:      Head: Normocephalic.      Right Ear: External ear normal.      Left Ear: External ear normal.      Nose: Nose normal.      Mouth/Throat:      Mouth: Mucous membranes are moist.      Pharynx: Oropharynx is clear.   Eyes:      Extraocular Movements: Extraocular movements intact.      Conjunctiva/sclera: Conjunctivae normal.      Pupils: Pupils are equal, round, and reactive to light.   Cardiovascular:      Rate and Rhythm: Normal rate " and regular rhythm.      Pulses: Normal pulses.      Heart sounds: Normal heart sounds.   Pulmonary:      Effort: Pulmonary effort is normal.      Breath sounds: Normal breath sounds.   Abdominal:      General: Bowel sounds are normal.      Palpations: Abdomen is soft.   Musculoskeletal:         General: Normal range of motion.      Cervical back: Normal range of motion and neck supple.   Skin:     General: Skin is warm and dry.   Neurological:      Mental Status: He is alert and oriented to person, place, and time. Mental status is at baseline.      Motor: Weakness present.   Psychiatric:         Mood and Affect: Mood normal. Affect is flat.         Behavior: Behavior is slowed. Behavior is not agitated or aggressive. Behavior is cooperative.         Judgment: Judgment is not inappropriate.          Assessment/Plan             Type 2 Diabetes:  Discontinue Metformin 500 mg PO QD  Start Metformin 500 mg PO BID.           Monitor Ha1c Q 3 months.  Continue to monitor accuchecks Q AM.   Continue diabetic snack at bedtime.             Paranoid Schizophrenia;unspecified psychosis; Major Depressive Disorder:           Continue Fluphenazine.          Follow up with in house psych.            CAD:          Continue  Aspirin.           Monitor for chest pain.           Weight fluctuation:          Monitor weights.            Diabetic neuropathy:  Not on any medications currently for neuropathy.             Generalized muscle weakness:  Patient is able to ambulate but usually prefers to stay in bed.  Does not ambulate very much.  Fall prevention strategies.    Osteoarthritis:   Continue acetaminophen PRN for pain.        Problem List Items Addressed This Visit          Cardiac and Vasculature    CAD (coronary artery disease)       Endocrine/Metabolic    Type 2 diabetes mellitus (CMS/HCC) - Primary       Mental Health    Major depressive disorder    Schizophrenia (CMS/HCC)       Neuro    Diabetic neuropathy (CMS/[facility])      Other Visit Diagnoses       Psychosis, unspecified psychosis type (CMS/[facility])        Fluctuation of weight        Osteoarthritis, unspecified osteoarthritis type, unspecified site        Generalized muscle weakness            Bethany Jones, APRN-CNP

## 2023-12-06 ENCOUNTER — NURSING HOME VISIT (OUTPATIENT)
Dept: POST ACUTE CARE | Facility: EXTERNAL LOCATION | Age: 63
End: 2023-12-06
Payer: MEDICAID

## 2023-12-06 DIAGNOSIS — I10 PRIMARY HYPERTENSION: ICD-10-CM

## 2023-12-06 DIAGNOSIS — F33.9 RECURRENT MAJOR DEPRESSIVE DISORDER, REMISSION STATUS UNSPECIFIED (CMS-HCC): ICD-10-CM

## 2023-12-06 DIAGNOSIS — E11.9 TYPE 2 DIABETES MELLITUS WITHOUT COMPLICATION, WITHOUT LONG-TERM CURRENT USE OF INSULIN (MULTI): Primary | ICD-10-CM

## 2023-12-06 DIAGNOSIS — F20.89 OTHER SCHIZOPHRENIA (MULTI): ICD-10-CM

## 2023-12-06 PROCEDURE — 99308 SBSQ NF CARE LOW MDM 20: CPT | Performed by: INTERNAL MEDICINE

## 2023-12-14 ENCOUNTER — NURSING HOME VISIT (OUTPATIENT)
Dept: POST ACUTE CARE | Facility: EXTERNAL LOCATION | Age: 63
End: 2023-12-14
Payer: MEDICAID

## 2023-12-14 DIAGNOSIS — Z91.199 NONCOMPLIANCE WITH DIAGNOSTIC TESTING: ICD-10-CM

## 2023-12-14 DIAGNOSIS — F20.0 CHRONIC PARANOID SCHIZOPHRENIA (MULTI): Primary | ICD-10-CM

## 2023-12-14 DIAGNOSIS — F33.9 RECURRENT MAJOR DEPRESSIVE DISORDER, REMISSION STATUS UNSPECIFIED (CMS-HCC): ICD-10-CM

## 2023-12-14 DIAGNOSIS — E11.9 TYPE 2 DIABETES MELLITUS WITHOUT COMPLICATION, WITHOUT LONG-TERM CURRENT USE OF INSULIN (MULTI): ICD-10-CM

## 2023-12-14 DIAGNOSIS — F29 PSYCHOSIS, UNSPECIFIED PSYCHOSIS TYPE (MULTI): ICD-10-CM

## 2023-12-14 DIAGNOSIS — M62.81 GENERALIZED MUSCLE WEAKNESS: ICD-10-CM

## 2023-12-14 PROCEDURE — 99308 SBSQ NF CARE LOW MDM 20: CPT | Performed by: NURSE PRACTITIONER

## 2023-12-14 NOTE — PROGRESS NOTES
Subjective- monthly follow up.   H/O schizophrenia, hypertension, hyperlipidemia, diabetes seen today for a routine visit .He is lying in his bed and denies any complaints no concerns per staff.   ROS:   General-no fever   Chest-no pain   Respiratory-nocough   Abdomen-no pain or diarrhea   General: no acute distress   Pain:   Vital signs: /95   T 97   79 Weight 148 lbs   Gen- NAD, sitting in bed   Lungs: clear to auscultation B/L   Cardio: S1-S2 normal   ABD: Soft, nontender   Musc/Skel:No deformities   Extremities: No pedal edema   Neuro: No neurological deficits   Psych: Schizophrenia     Assessment/plan-   Hypertension- stable   c/w ASA   Hyperlipidemia-   Diabetes type 2-Hba1c 6.1   c/w Metformin   Schizophrenia-continue with fluphenazine   Follow-up with psych   Weakness- c/w supportive care

## 2023-12-14 NOTE — LETTER
Patient: Carter Jefferson  : 1960    Encounter Date: 2023    Name: Carter Jefferson    YOB: 1960    Code Status: Full Code    Chief Complaint:   Follow up on Paranoid Schizophrenia; etc....    HPI       63 year old male at [facility] in [facility].  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     Diagnoses as follows:    Paranoid Schizophrenia; unspecified psychosis; Major Depressive Disorder; noncompliance with bloodwork results:   On Fluphenazine.  No psychosis or agitation reported.   Nursing reports he is  compliant with taking his medications.  However he is noncompliant with bloodwork at times.  Discussed importance of compliance with blood work with patient.   He is cooperative most of the time.  He is calm and cooperative today.  His mentation is at baseline.  No recent agitation reported by staff.  Patient generally sleeps a lot.   Patient seen today he is in bed.  Calm, cooperative.  Mentation at baseline.  No complaints of chest pain, headaches or dizziness.         Type 2 Diabetes:   On Metformin 500 mg PO every day.  Glyburide 2.5 mg was recently discontinued and metformin was decreased to 500 mg PO every day.  However, now Ha1c is increasing.   HA1c results:  21 HA1c 5.6 %   10/12/21 HA1c 5.5 %   5/3/2023: HA1c 5%  2023 Ha1c 5.3 %  2023: Hemoglobin A1c 6.1%  No  hypoglycemia reported.    Generalized muscle weakness:  Patient is able to ambulate but usually prefers to stay in bed.  Does not ambulate very much.  No recent falls reported.                Reviewed  EMR  Reviewed medical, social, surgical and family history.  Reviewed all current medications and performed medication reconciliation.  Performed prescription drug management.  Reviewed vital signs AND lab results  Reviewed [facility] Documentation  Discussed patient with nursing.     ROS: 10 point ROS performed; Negative unless noted in HPI.      Surgical History  Problems    · No history of surgery    Family History  Mother    · No pertinent family history    Social History   · Current every day smoker   · Light cigarette smoker    · Lives in long-term care facility    · No illicit drug use   · Quit consuming alcohol in remote past    Allergies  NoKnown    · No Known Allergies      Outside Labs:  CBC: Date: 10/19/21, WBC: 11.6, Hgb: 12, Hct: 38.5, PLT: 310   CBC: Date: 10/12/21, WBC: 13.2, Hgb: 11.9, Hct: 38.2, PLT: 305   BMP: Date: 10/19/21, Na: 140, K: 4.2, Cl: 104, CO2: 26, BUN: 17, Cr: 1.0, Glu: 51, Ca: 9.7   BMP: Date: 10/12/21, Na: 140, K: 4.3, Cl: 103, CO2: 27, BUN: 17, Cr: 1.1, Glu: 80, Ca: 9.7   Hepatic Function Tests: Date: 10/12/21, AST: 12, ALT: 12, Alk Phos: 44, T Bili: 0.3, Albumin: 4.0   Hepatic Function Tests: Date: 10/19/21, AST: 12, ALT: 11, Alk Phos: 40, T Bili: 0.3, Albumin: 3.9   11/18/20 HA1 C 5.6 %    10/12/21 HA1c 5.5 %.     BMP: Date: 5/3/2023 Na 141 K 4.2 Cr 0.9 BUN 17 glucose 49 Ca 9.6   CBC: Date: 5/3/2023 WBC 13.3 Hgb 11.3 hematocrit 36.5 platelets 323  Liver function: Date: 5/3/2023 ALT 7 AST 11 alkaline phos.  47 bilirubin 0.4 albumin 3.8 protein 8.2  Date: 5/3/2023: Lipid panel: Cholesterol 137; triglycerides 65; HDL 31; LDL 93.  Date 5/3/2023: HA1c 5%    BMP: Date: 8/4/2023 Na 140 K 4.1 Cr 1.1 BUN 18 glucose 44 Ca 9.2 GFR 82  CBC: Date: 8/4/2023 WBC 10.6 Hgb 10.5  "hematocrit 33.3 platelets 388  Liver function: Date: 8/4/2023 ALT 5 AST 11 alkaline phos.  57 bilirubin 0.3 albumin 3.7 protein 7.9    8/4/2023 hemoglobin A1c 5.3%    8/4/2023 vitamin D 25-hydroxy level 49.22 WNL    BMP: Date: 11/7/2023 Na 140 K 4.7 Cr 1.1 BUN 18 glucose 96 Ca 9.4 GFR 82  CBC: Date: 11/7/2023 WBC 10.1 Hgb 11 hematocrit 35.4 platelets 278    11/7/2023: Hemoglobin A1c 6.1%                Lab Results   Component Value Date    WBC 17.2 (H) 11/11/2020    HGB 12.8 (L) 11/11/2020    HCT 41.9 11/11/2020     11/11/2020    ALT 12 11/11/2020    AST 14 11/11/2020     11/11/2020    K 3.7 11/11/2020     11/11/2020    CREATININE 0.96 11/11/2020    BUN 14 11/11/2020    CO2 27 11/11/2020    HGBA1C 5.6 11/11/2020             /72   Pulse 66   Temp 36.4 °C (97.6 °F)   Resp 16   Ht 1.753 m (5' 9\")   Wt 66.5 kg (146 lb 11.2 oz)   SpO2 95%   BMI 21.66 kg/m²      Physical Exam  Vitals and nursing note reviewed.   Constitutional:       General: He is awake.      Appearance: He is underweight.      Comments: Chronically ill   HENT:      Head: Normocephalic.      Right Ear: External ear normal.      Left Ear: External ear normal.      Nose: Nose normal.      Mouth/Throat:      Mouth: Mucous membranes are moist.      Pharynx: Oropharynx is clear.   Eyes:      Extraocular Movements: Extraocular movements intact.      Conjunctiva/sclera: Conjunctivae normal.      Pupils: Pupils are equal, round, and reactive to light.   Cardiovascular:      Rate and Rhythm: Normal rate and regular rhythm.      Pulses: Normal pulses.      Heart sounds: Normal heart sounds.   Pulmonary:      Effort: Pulmonary effort is normal.      Breath sounds: Normal breath sounds.   Abdominal:      General: Bowel sounds are normal.      Palpations: Abdomen is soft.   Musculoskeletal:         General: Normal range of motion.      Cervical back: Normal range of motion and neck supple.   Skin:     General: Skin is warm and dry. "   Neurological:      Mental Status: He is alert and oriented to person, place, and time. Mental status is at baseline.      Motor: Weakness present.   Psychiatric:         Mood and Affect: Mood normal. Affect is flat.         Behavior: Behavior is slowed. Behavior is not agitated or aggressive. Behavior is cooperative.         Judgment: Judgment is not inappropriate.          Assessment/Plan                      Paranoid Schizophrenia;unspecified psychosis; Major Depressive Disorder; noncompliance with bloodwork:           Continue Fluphenazine.          Monitor for agitation and psychosis.          Encourage compliance with bloodwork.           Follow up with in house psych.            Type 2 Diabetes:  Continue Metformin 500 mg PO BID.           Monitor Ha1c Q 3 months.  Continue diabetic snack at bedtime.               Generalized muscle weakness:  Patient is able to ambulate but usually prefers to stay in bed.  Does not ambulate very much.  Fall prevention strategies.         Problem List Items Addressed This Visit          Endocrine/Metabolic    Type 2 diabetes mellitus (CMS/HCC)       Mental Health    Chronic paranoid schizophrenia (CMS/HCC) - Primary    Major depressive disorder     Other Visit Diagnoses       Psychosis, unspecified psychosis type (CMS/HCC)        Noncompliance with diagnostic testing        Generalized muscle weakness            CHON Valentine       Electronically Signed By: CHON Valentine   12/17/23  7:28 PM

## 2023-12-17 VITALS
RESPIRATION RATE: 16 BRPM | WEIGHT: 146.7 LBS | BODY MASS INDEX: 21.73 KG/M2 | HEIGHT: 69 IN | TEMPERATURE: 97.6 F | DIASTOLIC BLOOD PRESSURE: 72 MMHG | HEART RATE: 66 BPM | SYSTOLIC BLOOD PRESSURE: 121 MMHG | OXYGEN SATURATION: 95 %

## 2023-12-18 NOTE — PROGRESS NOTES
Name: Carter Jefferson    YOB: 1960    Code Status: Full Code    Chief Complaint:   Follow up on Paranoid Schizophrenia; etc....    HPI       63 year old male at [facility] in [facility].  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     Diagnoses as follows:    Paranoid Schizophrenia; unspecified psychosis; Major Depressive Disorder; noncompliance with bloodwork results:   On Fluphenazine.  No psychosis or agitation reported.   Nursing reports he is  compliant with taking his medications.  However he is noncompliant with bloodwork at times.  Discussed importance of compliance with blood work with patient.   He is cooperative most of the time.  He is calm and cooperative today.  His mentation is at baseline.  No recent agitation reported by staff.  Patient generally sleeps a lot.   Patient seen today he is in bed.  Calm, cooperative.  Mentation at baseline.  No complaints of chest pain, headaches or dizziness.         Type 2 Diabetes:   On Metformin 500 mg PO every day.  Glyburide 2.5 mg was recently discontinued and metformin was decreased to 500 mg PO every day.  However, now Ha1c is increasing.   HA1c results:  11/18/21 HA1c 5.6 %   10/12/21 HA1c 5.5 %   5/3/2023: HA1c 5%  8/4/2023 Ha1c 5.3 %  11/7/2023: Hemoglobin A1c 6.1%  No hypoglycemia reported.    Generalized muscle weakness:  Patient is able to  ambulate but usually prefers to stay in bed.  Does not ambulate very much.  No recent falls reported.                Reviewed  EMR  Reviewed medical, social, surgical and family history.  Reviewed all current medications and performed medication reconciliation.  Performed prescription drug management.  Reviewed vital signs AND lab results  Reviewed [facility] Documentation  Discussed patient with nursing.     ROS: 10 point ROS performed; Negative unless noted in HPI.      Surgical History  Problems    · No history of surgery    Family History  Mother    · No pertinent family history    Social History   · Current every day smoker   · Light cigarette smoker    · Lives in long-term care facility    · No illicit drug use   · Quit consuming alcohol in remote past    Allergies  NoKnown    · No Known Allergies      Outside Labs:  CBC: Date: 10/19/21, WBC: 11.6, Hgb: 12, Hct: 38.5, PLT: 310   CBC: Date: 10/12/21, WBC: 13.2, Hgb: 11.9, Hct: 38.2, PLT: 305   BMP: Date: 10/19/21, Na: 140, K: 4.2, Cl: 104, CO2: 26, BUN: 17, Cr: 1.0, Glu: 51, Ca: 9.7   BMP: Date: 10/12/21, Na: 140, K: 4.3, Cl: 103, CO2: 27, BUN: 17, Cr: 1.1, Glu: 80, Ca: 9.7   Hepatic Function Tests: Date: 10/12/21, AST: 12, ALT: 12, Alk Phos: 44, T Bili: 0.3, Albumin: 4.0   Hepatic Function Tests: Date: 10/19/21, AST: 12, ALT: 11, Alk Phos: 40, T Bili: 0.3, Albumin: 3.9   11/18/20 HA1 C 5.6 %    10/12/21 HA1c 5.5 %.     BMP: Date: 5/3/2023 Na 141 K 4.2 Cr 0.9 BUN 17 glucose 49 Ca 9.6   CBC: Date: 5/3/2023 WBC 13.3 Hgb 11.3 hematocrit 36.5 platelets 323  Liver function: Date: 5/3/2023 ALT 7 AST 11 alkaline phos.  47 bilirubin 0.4 albumin 3.8 protein 8.2  Date: 5/3/2023: Lipid panel: Cholesterol 137; triglycerides 65; HDL 31; LDL 93.  Date 5/3/2023: HA1c 5%    BMP: Date: 8/4/2023 Na 140 K 4.1 Cr 1.1 BUN 18 glucose 44 Ca 9.2 GFR 82  CBC: Date: 8/4/2023 WBC 10.6 Hgb 10.5 hematocrit 33.3 platelets 388  Liver function: Date: 8/4/2023 ALT 5 AST 11  "alkaline phos.  57 bilirubin 0.3 albumin 3.7 protein 7.9    8/4/2023 hemoglobin A1c 5.3%    8/4/2023 vitamin D 25-hydroxy level 49.22 WNL    BMP: Date: 11/7/2023 Na 140 K 4.7 Cr 1.1 BUN 18 glucose 96 Ca 9.4 GFR 82  CBC: Date: 11/7/2023 WBC 10.1 Hgb 11 hematocrit 35.4 platelets 278    11/7/2023: Hemoglobin A1c 6.1%                Lab Results   Component Value Date    WBC 17.2 (H) 11/11/2020    HGB 12.8 (L) 11/11/2020    HCT 41.9 11/11/2020     11/11/2020    ALT 12 11/11/2020    AST 14 11/11/2020     11/11/2020    K 3.7 11/11/2020     11/11/2020    CREATININE 0.96 11/11/2020    BUN 14 11/11/2020    CO2 27 11/11/2020    HGBA1C 5.6 11/11/2020             /72   Pulse 66   Temp 36.4 °C (97.6 °F)   Resp 16   Ht 1.753 m (5' 9\")   Wt 66.5 kg (146 lb 11.2 oz)   SpO2 95%   BMI 21.66 kg/m²      Physical Exam  Vitals and nursing note reviewed.   Constitutional:       General: He is awake.      Appearance: He is underweight.      Comments: Chronically ill   HENT:      Head: Normocephalic.      Right Ear: External ear normal.      Left Ear: External ear normal.      Nose: Nose normal.      Mouth/Throat:      Mouth: Mucous membranes are moist.      Pharynx: Oropharynx is clear.   Eyes:      Extraocular Movements: Extraocular movements intact.      Conjunctiva/sclera: Conjunctivae normal.      Pupils: Pupils are equal, round, and reactive to light.   Cardiovascular:      Rate and Rhythm: Normal rate and regular rhythm.      Pulses: Normal pulses.      Heart sounds: Normal heart sounds.   Pulmonary:      Effort: Pulmonary effort is normal.      Breath sounds: Normal breath sounds.   Abdominal:      General: Bowel sounds are normal.      Palpations: Abdomen is soft.   Musculoskeletal:         General: Normal range of motion.      Cervical back: Normal range of motion and neck supple.   Skin:     General: Skin is warm and dry.   Neurological:      Mental Status: He is alert and oriented to person, place, " and time. Mental status is at baseline.      Motor: Weakness present.   Psychiatric:         Mood and Affect: Mood normal. Affect is flat.         Behavior: Behavior is slowed. Behavior is not agitated or aggressive. Behavior is cooperative.         Judgment: Judgment is not inappropriate.          Assessment/Plan                       Paranoid Schizophrenia;unspecified psychosis; Major Depressive Disorder; noncompliance with bloodwork:           Continue Fluphenazine.          Monitor for agitation and psychosis.          Encourage compliance with bloodwork.           Follow up with in house psych.            Type 2 Diabetes:  Continue Metformin 500 mg PO BID.           Monitor Ha1c Q 3 months.  Continue diabetic snack at bedtime.               Generalized muscle weakness:  Patient is able to ambulate but usually prefers to stay in bed.  Does not ambulate very much.  Fall prevention strategies.         Problem List Items Addressed This Visit          Endocrine/Metabolic    Type 2 diabetes mellitus (CMS/HCC)       Mental Health    Chronic paranoid schizophrenia (CMS/HCC) - Primary    Major depressive disorder     Other Visit Diagnoses       Psychosis, unspecified psychosis type (CMS/HCC)        Noncompliance with diagnostic testing        Generalized muscle weakness            GARY Valentine-CNP

## 2024-01-16 ENCOUNTER — NURSING HOME VISIT (OUTPATIENT)
Dept: POST ACUTE CARE | Facility: EXTERNAL LOCATION | Age: 64
End: 2024-01-16
Payer: MEDICAID

## 2024-01-16 DIAGNOSIS — R68.89 FLUCTUATION OF WEIGHT: ICD-10-CM

## 2024-01-16 DIAGNOSIS — F20.0 CHRONIC PARANOID SCHIZOPHRENIA (MULTI): ICD-10-CM

## 2024-01-16 DIAGNOSIS — F33.9 RECURRENT MAJOR DEPRESSIVE DISORDER, REMISSION STATUS UNSPECIFIED (CMS-HCC): ICD-10-CM

## 2024-01-16 DIAGNOSIS — M62.81 GENERALIZED MUSCLE WEAKNESS: ICD-10-CM

## 2024-01-16 DIAGNOSIS — F29 PSYCHOSIS, UNSPECIFIED PSYCHOSIS TYPE (MULTI): ICD-10-CM

## 2024-01-16 DIAGNOSIS — M19.90 OSTEOARTHRITIS, UNSPECIFIED OSTEOARTHRITIS TYPE, UNSPECIFIED SITE: ICD-10-CM

## 2024-01-16 DIAGNOSIS — E11.9 TYPE 2 DIABETES MELLITUS WITHOUT COMPLICATION, WITHOUT LONG-TERM CURRENT USE OF INSULIN (MULTI): Primary | ICD-10-CM

## 2024-01-16 DIAGNOSIS — I25.10 CORONARY ARTERY DISEASE INVOLVING NATIVE HEART WITHOUT ANGINA PECTORIS, UNSPECIFIED VESSEL OR LESION TYPE: ICD-10-CM

## 2024-01-16 PROCEDURE — 99309 SBSQ NF CARE MODERATE MDM 30: CPT | Performed by: NURSE PRACTITIONER

## 2024-01-16 NOTE — LETTER
Patient: Carter Jefferson  : 1960    Encounter Date: 2024    Name: Carter Jefferson    YOB: 1960    Code Status: Full Code    Chief Complaint:   Follow up on DM 2; etc....    HPI       63 year old male at [facility] in [facility].  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     Diagnoses as follows:    Type 2 Diabetes:   On Metformin 500 mg PO BID.               Ha1c trends:  21 HA1c 5.6 %   10/12/21 HA1c 5.5 %   5/3/2023: HA1c 5%  2023 Ha1c 5.3 %  2023: Hemoglobin A1c 6.1%  Monitoring Ha1c q 3 months.  No hypoglycemia reported.  Patient seen today he is in bed.  Calm, cooperative.  Mentation at baseline.  No complaints of chest pain, headaches or dizziness.              Paranoid Schizophrenia; Major Depressive Disorder; unspecified psychosis:   On Fluphenazine.  No psychosis or agitation reported.   Nursing reports he is  compliant with taking his medications.  He is calm and cooperative today.  His mentation is at baseline.  No recent agitation reported by staff.  Patient generally sleeps a lot and likes to stay in bed.    CAD:               On Aspirin.   No complaints of chest pain today.  No chest pain reported by staff.    Osteoarthritis:   Has acetaminophen PRN for pain.   No complaints of any OA related pain today.  No reports of patient complaining of pain from staff.  He  ambulates well without any devices.  He does not walk around frequently.  No recent falls reported.     Weight fluctuation; Weight loss:  Patient had lost weight, but not has gain some weight back.  Recent weight trends:  5/3/23 145.8 #  6/2/23 145.2 #  7/5/23 143 #  8/3/23 148 #  9/1/23 145.8 #  10/19/23 148.2 #  11/23/23 150 #  12/4/23 149.4 #   1/8/24 154.8 #  Currently on regular diet, regular texture, thin consistency.  Patient has gained a small amount of weight this month.     Generalized muscle weakness:  Patient is able to ambulate but usually prefers to stay in bed.  Does not ambulate very much.  No recent falls reported.                    Reviewed  EMR  Reviewed medical, social, surgical and family history.  Reviewed all current medications and performed medication reconciliation.  Performed prescription drug management.  Reviewed vital signs AND lab results  Reviewed [facility] Documentation  Discussed patient with nursing.     ROS: 10 point ROS performed; Negative unless noted in HPI.      Surgical History  Problems    · No history of surgery    Family History  Mother    · No pertinent family history    Social History   · Current every day smoker   · Light cigarette smoker    · Lives in long-term care facility    · No illicit drug use   · Quit consuming alcohol in remote past    Allergies  NoKnown    · No Known Allergies      Outside Labs:  CBC: Date: 10/19/21, WBC: 11.6, Hgb: 12, Hct: 38.5, PLT: 310   CBC: Date: 10/12/21, WBC: 13.2, Hgb: 11.9, Hct: 38.2, PLT: 305   BMP: Date: 10/19/21, Na: 140, K: 4.2, Cl: 104, CO2: 26, BUN: 17, Cr: 1.0, Glu: 51, Ca: 9.7   BMP: Date: 10/12/21, Na: 140, K: 4.3, Cl: 103, CO2: 27, BUN: 17, Cr: 1.1, Glu: 80, Ca: 9.7   Hepatic Function Tests: Date: 10/12/21, AST: 12, ALT: 12, Alk Phos: 44, T Bili: 0.3, Albumin: 4.0   Hepatic Function Tests: Date: 10/19/21, AST: 12, ALT: 11, Alk Phos: 40, T Bili: 0.3, Albumin: 3.9   11/18/20 HA1 C 5.6 %    10/12/21 HA1c 5.5 %.  "    BMP: Date: 5/3/2023 Na 141 K 4.2 Cr 0.9 BUN 17 glucose 49 Ca 9.6   CBC: Date: 5/3/2023 WBC 13.3 Hgb 11.3 hematocrit 36.5 platelets 323  Liver function: Date: 5/3/2023 ALT 7 AST 11 alkaline phos.  47 bilirubin 0.4 albumin 3.8 protein 8.2  Date: 5/3/2023: Lipid panel: Cholesterol 137; triglycerides 65; HDL 31; LDL 93.  Date 5/3/2023: HA1c 5%    BMP: Date: 8/4/2023 Na 140 K 4.1 Cr 1.1 BUN 18 glucose 44 Ca 9.2 GFR 82  CBC: Date: 8/4/2023 WBC 10.6 Hgb 10.5 hematocrit 33.3 platelets 388  Liver function: Date: 8/4/2023 ALT 5 AST 11 alkaline phos.  57 bilirubin 0.3 albumin 3.7 protein 7.9    8/4/2023 hemoglobin A1c 5.3%    8/4/2023 vitamin D 25-hydroxy level 49.22 WNL    BMP: Date: 11/7/2023 Na 140 K 4.7 Cr 1.1 BUN 18 glucose 96 Ca 9.4 GFR 82  CBC: Date: 11/7/2023 WBC 10.1 Hgb 11 hematocrit 35.4 platelets 278    11/7/2023: Hemoglobin A1c 6.1%                Lab Results   Component Value Date    WBC 17.2 (H) 11/11/2020    HGB 12.8 (L) 11/11/2020    HCT 41.9 11/11/2020     11/11/2020    ALT 12 11/11/2020    AST 14 11/11/2020     11/11/2020    K 3.7 11/11/2020     11/11/2020    CREATININE 0.96 11/11/2020    BUN 14 11/11/2020    CO2 27 11/11/2020    HGBA1C 5.6 11/11/2020             /73   Pulse 61   Temp 36.4 °C (97.6 °F)   Resp 20   Ht 1.753 m (5' 9\")   Wt 70.2 kg (154 lb 12.8 oz)   SpO2 97%   BMI 22.86 kg/m²      Physical Exam  Vitals and nursing note reviewed.   Constitutional:       General: He is awake.      Appearance: He is underweight.      Comments: Chronically ill   HENT:      Head: Normocephalic.      Right Ear: External ear normal.      Left Ear: External ear normal.      Nose: Nose normal.      Mouth/Throat:      Mouth: Mucous membranes are moist.      Pharynx: Oropharynx is clear.   Eyes:      Extraocular Movements: Extraocular movements intact.      Conjunctiva/sclera: Conjunctivae normal.      Pupils: Pupils are equal, round, and reactive to light.   Cardiovascular:     "  Rate and Rhythm: Normal rate and regular rhythm.      Pulses: Normal pulses.      Heart sounds: Normal heart sounds.   Pulmonary:      Effort: Pulmonary effort is normal.      Breath sounds: Normal breath sounds.   Abdominal:      General: Bowel sounds are normal.      Palpations: Abdomen is soft.   Musculoskeletal:         General: Normal range of motion.      Cervical back: Normal range of motion and neck supple.   Skin:     General: Skin is warm and dry.   Neurological:      Mental Status: He is alert and oriented to person, place, and time. Mental status is at baseline.      Motor: Weakness present.   Psychiatric:         Mood and Affect: Mood normal. Affect is flat.         Behavior: Behavior is slowed. Behavior is not agitated or aggressive. Behavior is cooperative.         Judgment: Judgment is not inappropriate.          Assessment/Plan    DM 2:  Continue Metformin 500 mg PO BID.  Monitor Ha1c Q 3 months.  Continue to eat diabetic diet.              Paranoid Schizophrenia; Major Depressive Disorder; unspecified psychosis:   Continue Fluphenazine.  Monitor for psychosis or agitation.    CAD:               Continue Aspirin.     Monitor for chest pain.    Osteoarthritis:   Continue acetaminophen PRN for pain.   Monitor for pain.  Fall prevention strategies.    Weight fluctuation; Weight loss:  Monitor weights.  Recent weight gain.  Continue regular diet, regular texture, thin consistency.    Generalized muscle weakness:  Patient is able to ambulate but usually prefers to stay in bed.  Staff to encourage patient to get out of bed.  Fall prevention strategies.      Problem List Items Addressed This Visit          Cardiac and Vasculature    CAD (coronary artery disease)       Endocrine/Metabolic    Type 2 diabetes mellitus (CMS/HCC) - Primary       Mental Health    Chronic paranoid schizophrenia (CMS/HCC)    Major depressive disorder     Other Visit Diagnoses       Psychosis, unspecified psychosis type (CMS/HCC)         Osteoarthritis, unspecified osteoarthritis type, unspecified site        Fluctuation of weight        Generalized muscle weakness            CHON Valentine       Electronically Signed By: CHON Valentine   1/20/24  5:03 PM

## 2024-01-20 VITALS
SYSTOLIC BLOOD PRESSURE: 127 MMHG | TEMPERATURE: 97.6 F | OXYGEN SATURATION: 97 % | DIASTOLIC BLOOD PRESSURE: 73 MMHG | HEART RATE: 61 BPM | RESPIRATION RATE: 20 BRPM | BODY MASS INDEX: 22.93 KG/M2 | HEIGHT: 69 IN | WEIGHT: 154.8 LBS

## 2024-01-20 NOTE — PROGRESS NOTES
Name: Carter Jefferson    YOB: 1960    Code Status: Full Code    Chief Complaint:   Follow up on DM 2; etc....    HPI       63 year old male at [facility] in Long Term Care.  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     Diagnoses as follows:    Type 2 Diabetes:   On Metformin 500 mg PO BID.               Ha1c trends:  11/18/21 HA1c 5.6 %   10/12/21 HA1c 5.5 %   5/3/2023: HA1c 5%  8/4/2023 Ha1c 5.3 %  11/7/2023: Hemoglobin A1c 6.1%  Monitoring Ha1c q 3 months.  No hypoglycemia reported.  Patient seen today he is in bed.  Calm, cooperative.  Mentation at baseline.  No complaints of chest pain, headaches or dizziness.              Paranoid Schizophrenia; Major Depressive Disorder; unspecified psychosis:   On Fluphenazine.  No psychosis or agitation reported.   Nursing reports he is  compliant with taking his medications.  He is calm and cooperative today.  His mentation is at baseline.  No recent agitation reported by staff.  Patient generally sleeps a lot and likes to stay in bed.    CAD:               On Aspirin.   No complaints of chest pain today.  No chest pain reported by staff.    Osteoarthritis:   Has acetaminophen PRN for pain.   No complaints of any OA related pain today.  No reports of patient complaining of pain from staff.  He ambulates well without any devices.  He does not walk around  frequently.  No recent falls reported.     Weight fluctuation; Weight loss:  Patient had lost weight, but not has gain some weight back.  Recent weight trends:  5/3/23 145.8 #  6/2/23 145.2 #  7/5/23 143 #  8/3/23 148 #  9/1/23 145.8 #  10/19/23 148.2 #  11/23/23 150 #  12/4/23 149.4 #   1/8/24 154.8 #  Currently on regular diet, regular texture, thin consistency.  Patient has gained a small amount of weight this month.     Generalized muscle weakness:  Patient is able to ambulate but usually prefers to stay in bed.  Does not ambulate very much.  No recent falls reported.                    Reviewed  EMR  Reviewed medical, social, surgical and family history.  Reviewed all current medications and performed medication reconciliation.  Performed prescription drug management.  Reviewed vital signs AND lab results  Reviewed [facility] Documentation  Discussed patient with nursing.     ROS: 10 point ROS performed; Negative unless noted in HPI.      Surgical History  Problems    · No history of surgery    Family History  Mother    · No pertinent family history    Social History   · Current every day smoker   · Light cigarette smoker    · Lives in long-term care facility    · No illicit drug use   · Quit consuming alcohol in remote past    Allergies  NoKnown    · No Known Allergies      Outside Labs:  CBC: Date: 10/19/21, WBC: 11.6, Hgb: 12, Hct: 38.5, PLT: 310   CBC: Date: 10/12/21, WBC: 13.2, Hgb: 11.9, Hct: 38.2, PLT: 305   BMP: Date: 10/19/21, Na: 140, K: 4.2, Cl: 104, CO2: 26, BUN: 17, Cr: 1.0, Glu: 51, Ca: 9.7   BMP: Date: 10/12/21, Na: 140, K: 4.3, Cl: 103, CO2: 27, BUN: 17, Cr: 1.1, Glu: 80, Ca: 9.7   Hepatic Function Tests: Date: 10/12/21, AST: 12, ALT: 12, Alk Phos: 44, T Bili: 0.3, Albumin: 4.0   Hepatic Function Tests: Date: 10/19/21, AST: 12, ALT: 11, Alk Phos: 40, T Bili: 0.3, Albumin: 3.9   11/18/20 HA1 C 5.6 %    10/12/21 HA1c 5.5 %.     BMP: Date: 5/3/2023 Na 141 K 4.2 Cr 0.9 BUN 17 glucose 49 Ca  "9.6   CBC: Date: 5/3/2023 WBC 13.3 Hgb 11.3 hematocrit 36.5 platelets 323  Liver function: Date: 5/3/2023 ALT 7 AST 11 alkaline phos.  47 bilirubin 0.4 albumin 3.8 protein 8.2  Date: 5/3/2023: Lipid panel: Cholesterol 137; triglycerides 65; HDL 31; LDL 93.  Date 5/3/2023: HA1c 5%    BMP: Date: 8/4/2023 Na 140 K 4.1 Cr 1.1 BUN 18 glucose 44 Ca 9.2 GFR 82  CBC: Date: 8/4/2023 WBC 10.6 Hgb 10.5 hematocrit 33.3 platelets 388  Liver function: Date: 8/4/2023 ALT 5 AST 11 alkaline phos.  57 bilirubin 0.3 albumin 3.7 protein 7.9    8/4/2023 hemoglobin A1c 5.3%    8/4/2023 vitamin D 25-hydroxy level 49.22 WNL    BMP: Date: 11/7/2023 Na 140 K 4.7 Cr 1.1 BUN 18 glucose 96 Ca 9.4 GFR 82  CBC: Date: 11/7/2023 WBC 10.1 Hgb 11 hematocrit 35.4 platelets 278    11/7/2023: Hemoglobin A1c 6.1%                Lab Results   Component Value Date    WBC 17.2 (H) 11/11/2020    HGB 12.8 (L) 11/11/2020    HCT 41.9 11/11/2020     11/11/2020    ALT 12 11/11/2020    AST 14 11/11/2020     11/11/2020    K 3.7 11/11/2020     11/11/2020    CREATININE 0.96 11/11/2020    BUN 14 11/11/2020    CO2 27 11/11/2020    HGBA1C 5.6 11/11/2020             /73   Pulse 61   Temp 36.4 °C (97.6 °F)   Resp 20   Ht 1.753 m (5' 9\")   Wt 70.2 kg (154 lb 12.8 oz)   SpO2 97%   BMI 22.86 kg/m²      Physical Exam  Vitals and nursing note reviewed.   Constitutional:       General: He is awake.      Appearance: He is underweight.      Comments: Chronically ill   HENT:      Head: Normocephalic.      Right Ear: External ear normal.      Left Ear: External ear normal.      Nose: Nose normal.      Mouth/Throat:      Mouth: Mucous membranes are moist.      Pharynx: Oropharynx is clear.   Eyes:      Extraocular Movements: Extraocular movements intact.      Conjunctiva/sclera: Conjunctivae normal.      Pupils: Pupils are equal, round, and reactive to light.   Cardiovascular:      Rate and Rhythm: Normal rate and regular rhythm.      Pulses: " Normal pulses.      Heart sounds: Normal heart sounds.   Pulmonary:      Effort: Pulmonary effort is normal.      Breath sounds: Normal breath sounds.   Abdominal:      General: Bowel sounds are normal.      Palpations: Abdomen is soft.   Musculoskeletal:         General: Normal range of motion.      Cervical back: Normal range of motion and neck supple.   Skin:     General: Skin is warm and dry.   Neurological:      Mental Status: He is alert and oriented to person, place, and time. Mental status is at baseline.      Motor: Weakness present.   Psychiatric:         Mood and Affect: Mood normal. Affect is flat.         Behavior: Behavior is slowed. Behavior is not agitated or aggressive. Behavior is cooperative.         Judgment: Judgment is not inappropriate.          Assessment/Plan     DM 2:  Continue Metformin 500 mg PO BID.  Monitor Ha1c Q 3 months.  Continue to eat diabetic diet.              Paranoid Schizophrenia; Major Depressive Disorder; unspecified psychosis:   Continue Fluphenazine.  Monitor for psychosis or agitation.    CAD:               Continue Aspirin.     Monitor for chest pain.    Osteoarthritis:   Continue acetaminophen PRN for pain.   Monitor for pain.  Fall prevention strategies.    Weight fluctuation; Weight loss:  Monitor weights.  Recent weight gain.  Continue regular diet, regular texture, thin consistency.    Generalized muscle weakness:  Patient is able to ambulate but usually prefers to stay in bed.  Staff to encourage patient to get out of bed.  Fall prevention strategies.      Problem List Items Addressed This Visit          Cardiac and Vasculature    CAD (coronary artery disease)       Endocrine/Metabolic    Type 2 diabetes mellitus (CMS/HCC) - Primary       Mental Health    Chronic paranoid schizophrenia (CMS/HCC)    Major depressive disorder     Other Visit Diagnoses       Psychosis, unspecified psychosis type (CMS/HCC)        Osteoarthritis, unspecified osteoarthritis type,  unspecified site        Fluctuation of weight        Generalized muscle weakness            Bethany Jones, APRN-CNP

## 2024-02-13 ENCOUNTER — NURSING HOME VISIT (OUTPATIENT)
Dept: POST ACUTE CARE | Facility: EXTERNAL LOCATION | Age: 64
End: 2024-02-13
Payer: MEDICAID

## 2024-02-13 DIAGNOSIS — E11.9 TYPE 2 DIABETES MELLITUS WITHOUT COMPLICATION, WITHOUT LONG-TERM CURRENT USE OF INSULIN (MULTI): Primary | ICD-10-CM

## 2024-02-13 DIAGNOSIS — F33.9 RECURRENT MAJOR DEPRESSIVE DISORDER, REMISSION STATUS UNSPECIFIED (CMS-HCC): ICD-10-CM

## 2024-02-13 DIAGNOSIS — F29 PSYCHOSIS, UNSPECIFIED PSYCHOSIS TYPE (MULTI): ICD-10-CM

## 2024-02-13 DIAGNOSIS — F20.0 CHRONIC PARANOID SCHIZOPHRENIA (MULTI): ICD-10-CM

## 2024-02-13 PROCEDURE — 99308 SBSQ NF CARE LOW MDM 20: CPT | Performed by: NURSE PRACTITIONER

## 2024-02-13 NOTE — LETTER
Patient: Carter Jefferson  : 1960    Encounter Date: 2024    Name: Carter Jefferson    YOB: 1960    Code Status: Full Code    Chief Complaint:   Follow up on DM 2; etc....    HPI       63 year old male at [facility] in Long Term [facility].  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     Diagnoses as follows:    Type 2 Diabetes:   On Metformin 500 mg PO BID.               Ha1c trends:  21 HA1c 5.6 %   10/12/21 HA1c 5.5 %   5/3/2023: HA1c 5%  2023 Ha1c 5.3 %  2023: Hemoglobin A1c 6.1%  Monitoring Ha1c q 3 months.  No hypoglycemia reported.  Patient seen today he is in bed.  Calm, cooperative.  Mentation at baseline.  No complaints of chest pain, headaches or dizziness.  No nausea or vomiting.   No abdominal pain.             Major Depressive Disorder;  Paranoid Schizophrenia; unspecified psychosis:   On Fluphenazine.  No psychosis or agitation reported.   Nursing reports he is  compliant with taking his medications.  His mentation is at baseline.  No recent agitation reported by staff.  Patient generally sleeps a lot and likes to stay in bed.                         Reviewed  EMR  Reviewed medical, social, surgical and family history.  Reviewed all current medications and performed medication reconciliation.  Performed prescription drug management.  Reviewed vital signs AND lab  results  Reviewed [facility] Documentation  Discussed patient with nursing.     ROS: 10 point ROS performed; Negative unless noted in HPI.      Surgical History  Problems    · No history of surgery    Family History  Mother    · No pertinent family history    Social History   · Current every day smoker   · Light cigarette smoker    · Lives in long-term care facility    · No illicit drug use   · Quit consuming alcohol in remote past    Allergies  NoKnown    · No Known Allergies      Outside Labs:  CBC: Date: 10/19/21, WBC: 11.6, Hgb: 12, Hct: 38.5, PLT: 310   CBC: Date: 10/12/21, WBC: 13.2, Hgb: 11.9, Hct: 38.2, PLT: 305   BMP: Date: 10/19/21, Na: 140, K: 4.2, Cl: 104, CO2: 26, BUN: 17, Cr: 1.0, Glu: 51, Ca: 9.7   BMP: Date: 10/12/21, Na: 140, K: 4.3, Cl: 103, CO2: 27, BUN: 17, Cr: 1.1, Glu: 80, Ca: 9.7   Hepatic Function Tests: Date: 10/12/21, AST: 12, ALT: 12, Alk Phos: 44, T Bili: 0.3, Albumin: 4.0   Hepatic Function Tests: Date: 10/19/21, AST: 12, ALT: 11, Alk Phos: 40, T Bili: 0.3, Albumin: 3.9   11/18/20 HA1 C 5.6 %    10/12/21 HA1c 5.5 %.     BMP: Date: 5/3/2023 Na 141 K 4.2 Cr 0.9 BUN 17 glucose 49 Ca 9.6   CBC: Date: 5/3/2023 WBC 13.3 Hgb 11.3 hematocrit 36.5 platelets 323  Liver function: Date: 5/3/2023 ALT 7 AST 11 alkaline phos.  47 bilirubin 0.4 albumin 3.8 protein 8.2  Date: 5/3/2023: Lipid panel: Cholesterol 137; triglycerides 65; HDL 31; LDL 93.  Date 5/3/2023: HA1c 5%    BMP: Date: 8/4/2023 Na 140 K 4.1 Cr 1.1 BUN 18 glucose 44 Ca 9.2 GFR 82  CBC: Date: 8/4/2023 WBC 10.6 Hgb 10.5 hematocrit 33.3 platelets 388  Liver function: Date: 8/4/2023 ALT 5 AST 11 alkaline phos.  57 bilirubin 0.3 albumin 3.7 protein 7.9    8/4/2023 hemoglobin A1c 5.3%    8/4/2023 vitamin D 25-hydroxy level 49.22 WNL    BMP: Date: 11/7/2023 Na 140 K 4.7 Cr 1.1 BUN 18 glucose 96 Ca 9.4 GFR 82  CBC: Date: 11/7/2023 WBC 10.1 Hgb 11 hematocrit 35.4 platelets 278    11/7/2023: Hemoglobin A1c 6.1%                Lab  "Results   Component Value Date    WBC 17.2 (H) 11/11/2020    HGB 12.8 (L) 11/11/2020    HCT 41.9 11/11/2020     11/11/2020    ALT 12 11/11/2020    AST 14 11/11/2020     11/11/2020    K 3.7 11/11/2020     11/11/2020    CREATININE 0.96 11/11/2020    BUN 14 11/11/2020    CO2 27 11/11/2020    HGBA1C 5.6 11/11/2020             /77   Pulse 64   Temp 36.6 °C (97.8 °F)   Resp 20   Ht 1.753 m (5' 9\")   Wt 68.7 kg (151 lb 6.4 oz)   SpO2 97%   BMI 22.36 kg/m²      Physical Exam  Vitals and nursing note reviewed.   Constitutional:       General: He is awake.      Appearance: He is underweight.      Comments: Chronically ill   HENT:      Head: Normocephalic.      Right Ear: External ear normal.      Left Ear: External ear normal.      Nose: Nose normal.      Mouth/Throat:      Mouth: Mucous membranes are moist.      Pharynx: Oropharynx is clear.   Eyes:      Extraocular Movements: Extraocular movements intact.      Conjunctiva/sclera: Conjunctivae normal.      Pupils: Pupils are equal, round, and reactive to light.   Cardiovascular:      Rate and Rhythm: Normal rate and regular rhythm.      Pulses: Normal pulses.      Heart sounds: Normal heart sounds.   Pulmonary:      Effort: Pulmonary effort is normal.      Breath sounds: Normal breath sounds.   Abdominal:      General: Bowel sounds are normal.      Palpations: Abdomen is soft.   Musculoskeletal:         General: Normal range of motion.      Cervical back: Normal range of motion and neck supple.   Skin:     General: Skin is warm and dry.   Neurological:      Mental Status: He is alert and oriented to person, place, and time. Mental status is at baseline.      Motor: Weakness present.   Psychiatric:         Mood and Affect: Mood normal. Affect is flat.         Behavior: Behavior is slowed. Behavior is not agitated or aggressive. Behavior is cooperative.         Judgment: Judgment is not inappropriate.          Assessment/Plan  Ordered CMP, CBC " with diff., Ha1c, lipid panel the 1st Monday in March, 2024.       DM 2:  Continue Metformin 500 mg PO BID.  Monitor Ha1c Q 3 months.  Continue to eat diabetic diet.              Major Depressive Disorder; Paranoid Schizophrenia; unspecified psychosis:   Continue Fluphenazine.  Monitor for psychosis or agitation.          Problem List Items Addressed This Visit          Endocrine/Metabolic    Type 2 diabetes mellitus (CMS/HCC) - Primary       Mental Health    Chronic paranoid schizophrenia (CMS/HCC)    Major depressive disorder     Other Visit Diagnoses       Psychosis, unspecified psychosis type (CMS/HCC)            CHON Valentine       Electronically Signed By: CHON Valentine   2/18/24  9:07 PM

## 2024-02-18 VITALS
DIASTOLIC BLOOD PRESSURE: 77 MMHG | WEIGHT: 151.4 LBS | BODY MASS INDEX: 22.42 KG/M2 | HEIGHT: 69 IN | OXYGEN SATURATION: 97 % | TEMPERATURE: 97.8 F | RESPIRATION RATE: 20 BRPM | SYSTOLIC BLOOD PRESSURE: 144 MMHG | HEART RATE: 64 BPM

## 2024-02-19 NOTE — PROGRESS NOTES
Name: Carter Jefferson    YOB: 1960    Code Status: Full Code    Chief Complaint:   Follow up on DM 2; etc....    HPI       63 year old male at [facility] in Long Term Care.  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     Diagnoses as follows:    Type 2 Diabetes:   On Metformin 500 mg PO BID.               Ha1c trends:  11/18/21 HA1c 5.6 %   10/12/21 HA1c 5.5 %   5/3/2023: HA1c 5%  8/4/2023 Ha1c 5.3 %  11/7/2023: Hemoglobin A1c 6.1%  Monitoring Ha1c q 3 months.  No hypoglycemia reported.  Patient seen today he is in bed.  Calm, cooperative.  Mentation at baseline.  No complaints of chest pain, headaches or dizziness.  No nausea or vomiting.   No abdominal pain.             Major Depressive Disorder;  Paranoid Schizophrenia; unspecified psychosis:   On Fluphenazine.  No psychosis or agitation reported.   Nursing reports he is  compliant with taking his medications.  His mentation is at baseline.  No recent agitation reported by staff.  Patient generally sleeps a lot and likes to stay in bed.                         Reviewed  EMR  Reviewed medical, social, surgical and family history.  Reviewed all current medications and performed medication reconciliation.  Performed prescription drug management.  Reviewed vital signs AND lab results  Reviewed [facility] Documentation  Discussed patient with nursing.      ROS: 10 point ROS performed; Negative unless noted in HPI.      Surgical History  Problems    · No history of surgery    Family History  Mother    · No pertinent family history    Social History   · Current every day smoker   · Light cigarette smoker    · Lives in long-term care facility    · No illicit drug use   · Quit consuming alcohol in remote past    Allergies  NoKnown    · No Known Allergies      Outside Labs:  CBC: Date: 10/19/21, WBC: 11.6, Hgb: 12, Hct: 38.5, PLT: 310   CBC: Date: 10/12/21, WBC: 13.2, Hgb: 11.9, Hct: 38.2, PLT: 305   BMP: Date: 10/19/21, Na: 140, K: 4.2, Cl: 104, CO2: 26, BUN: 17, Cr: 1.0, Glu: 51, Ca: 9.7   BMP: Date: 10/12/21, Na: 140, K: 4.3, Cl: 103, CO2: 27, BUN: 17, Cr: 1.1, Glu: 80, Ca: 9.7   Hepatic Function Tests: Date: 10/12/21, AST: 12, ALT: 12, Alk Phos: 44, T Bili: 0.3, Albumin: 4.0   Hepatic Function Tests: Date: 10/19/21, AST: 12, ALT: 11, Alk Phos: 40, T Bili: 0.3, Albumin: 3.9   11/18/20 HA1 C 5.6 %    10/12/21 HA1c 5.5 %.     BMP: Date: 5/3/2023 Na 141 K 4.2 Cr 0.9 BUN 17 glucose 49 Ca 9.6   CBC: Date: 5/3/2023 WBC 13.3 Hgb 11.3 hematocrit 36.5 platelets 323  Liver function: Date: 5/3/2023 ALT 7 AST 11 alkaline phos.  47 bilirubin 0.4 albumin 3.8 protein 8.2  Date: 5/3/2023: Lipid panel: Cholesterol 137; triglycerides 65; HDL 31; LDL 93.  Date 5/3/2023: HA1c 5%    BMP: Date: 8/4/2023 Na 140 K 4.1 Cr 1.1 BUN 18 glucose 44 Ca 9.2 GFR 82  CBC: Date: 8/4/2023 WBC 10.6 Hgb 10.5 hematocrit 33.3 platelets 388  Liver function: Date: 8/4/2023 ALT 5 AST 11 alkaline phos.  57 bilirubin 0.3 albumin 3.7 protein 7.9    8/4/2023 hemoglobin A1c 5.3%    8/4/2023 vitamin D 25-hydroxy level 49.22 WNL    BMP: Date: 11/7/2023 Na 140 K 4.7 Cr 1.1 BUN 18 glucose 96 Ca 9.4 GFR 82  CBC: Date: 11/7/2023 WBC 10.1 Hgb 11 hematocrit 35.4 platelets 278    11/7/2023: Hemoglobin A1c 6.1%                Lab Results   Component Value Date    WBC 17.2 (H) 11/11/2020    HGB 12.8 (L) 11/11/2020     "HCT 41.9 11/11/2020     11/11/2020    ALT 12 11/11/2020    AST 14 11/11/2020     11/11/2020    K 3.7 11/11/2020     11/11/2020    CREATININE 0.96 11/11/2020    BUN 14 11/11/2020    CO2 27 11/11/2020    HGBA1C 5.6 11/11/2020             /77   Pulse 64   Temp 36.6 °C (97.8 °F)   Resp 20   Ht 1.753 m (5' 9\")   Wt 68.7 kg (151 lb 6.4 oz)   SpO2 97%   BMI 22.36 kg/m²      Physical Exam  Vitals and nursing note reviewed.   Constitutional:       General: He is awake.      Appearance: He is underweight.      Comments: Chronically ill   HENT:      Head: Normocephalic.      Right Ear: External ear normal.      Left Ear: External ear normal.      Nose: Nose normal.      Mouth/Throat:      Mouth: Mucous membranes are moist.      Pharynx: Oropharynx is clear.   Eyes:      Extraocular Movements: Extraocular movements intact.      Conjunctiva/sclera: Conjunctivae normal.      Pupils: Pupils are equal, round, and reactive to light.   Cardiovascular:      Rate and Rhythm: Normal rate and regular rhythm.      Pulses: Normal pulses.      Heart sounds: Normal heart sounds.   Pulmonary:      Effort: Pulmonary effort is normal.      Breath sounds: Normal breath sounds.   Abdominal:      General: Bowel sounds are normal.      Palpations: Abdomen is soft.   Musculoskeletal:         General: Normal range of motion.      Cervical back: Normal range of motion and neck supple.   Skin:     General: Skin is warm and dry.   Neurological:      Mental Status: He is alert and oriented to person, place, and time. Mental status is at baseline.      Motor: Weakness present.   Psychiatric:         Mood and Affect: Mood normal. Affect is flat.         Behavior: Behavior is slowed. Behavior is not agitated or aggressive. Behavior is cooperative.         Judgment: Judgment is not inappropriate.          Assessment/Plan   Ordered CMP, CBC with diff., Ha1c, lipid panel the 1st Monday in March, 2024.       DM 2:  Continue " Metformin 500 mg PO BID.  Monitor Ha1c Q 3 months.  Continue to eat diabetic diet.              Major Depressive Disorder; Paranoid Schizophrenia; unspecified psychosis:   Continue Fluphenazine.  Monitor for psychosis or agitation.          Problem List Items Addressed This Visit          Endocrine/Metabolic    Type 2 diabetes mellitus (CMS/HCC) - Primary       Mental Health    Chronic paranoid schizophrenia (CMS/HCC)    Major depressive disorder     Other Visit Diagnoses       Psychosis, unspecified psychosis type (CMS/HCC)            Bethany Jones, GARY-CNP

## 2024-02-23 ENCOUNTER — NURSING HOME VISIT (OUTPATIENT)
Dept: POST ACUTE CARE | Facility: EXTERNAL LOCATION | Age: 64
End: 2024-02-23
Payer: MEDICAID

## 2024-02-23 DIAGNOSIS — F20.89 OTHER SCHIZOPHRENIA (MULTI): ICD-10-CM

## 2024-02-23 DIAGNOSIS — F33.9 RECURRENT MAJOR DEPRESSIVE DISORDER, REMISSION STATUS UNSPECIFIED (CMS-HCC): ICD-10-CM

## 2024-02-23 DIAGNOSIS — E11.00 TYPE 2 DIABETES MELLITUS WITH HYPEROSMOLARITY WITHOUT COMA, WITHOUT LONG-TERM CURRENT USE OF INSULIN (MULTI): ICD-10-CM

## 2024-02-23 DIAGNOSIS — I10 PRIMARY HYPERTENSION: ICD-10-CM

## 2024-02-23 DIAGNOSIS — J44.9 CHRONIC OBSTRUCTIVE PULMONARY DISEASE, UNSPECIFIED COPD TYPE (MULTI): Primary | ICD-10-CM

## 2024-02-23 PROCEDURE — 99308 SBSQ NF CARE LOW MDM 20: CPT | Performed by: INTERNAL MEDICINE

## 2024-02-23 NOTE — LETTER
Patient: Carter Jefferson  : 1960    Encounter Date: 2024     Subjective- monthly follow up.  H/O schizophrenia, hypertension, hyperlipidemia, diabetes seen today for a routine visit .He is walking in the hallway .Denies any complaints no concerns per staff.  ROS:  General-no fever  Chest-no pain  Respiratory-nocough  Abdomen-no pain or diarrhea  General: no acute distress  Pain:  Vital signs: /77  T 97.8  79 Weight 151 lbs  Gen- NAD,alert  Lungs: clear to auscultation B/L  Cardio: S1-S2 normal  ABD: Soft, nontender  Musc/Skel:No deformities  Extremities: No pedal edema  Neuro: No neurological deficits  Psych: Schizophrenia  Assessment/plan-  Hypertension- stable  c/w ASA  Hyperlipidemia-  Diabetes type 2-Hba1c 6.1  c/w Metformin  Schizophrenia-continue with fluphenazine  Follow-up with psych  Weakness- c/wsupportive care      Electronically Signed By: Dinane Jewell MD   24  2:50 PM

## 2024-02-29 PROBLEM — J44.9 CHRONIC OBSTRUCTIVE PULMONARY DISEASE, UNSPECIFIED COPD TYPE (MULTI): Status: ACTIVE | Noted: 2024-02-29

## 2024-02-29 PROBLEM — E11.00 TYPE 2 DIABETES MELLITUS WITH HYPEROSMOLARITY WITHOUT COMA, WITHOUT LONG-TERM CURRENT USE OF INSULIN (MULTI): Status: ACTIVE | Noted: 2023-04-21

## 2024-02-29 NOTE — PROGRESS NOTES
Subjective- monthly follow up.  H/O schizophrenia, hypertension, hyperlipidemia, diabetes seen today for a routine visit .He is walking in the hallway .Denies any complaints no concerns per staff.  ROS:  General-no fever  Chest-no pain  Respiratory-nocough  Abdomen-no pain or diarrhea  General: no acute distress  Pain:  Vital signs: /77  T 97.8  79 Weight 151 lbs  Gen- NAD,alert  Lungs: clear to auscultation B/L  Cardio: S1-S2 normal  ABD: Soft, nontender  Musc/Skel:No deformities  Extremities: No pedal edema  Neuro: No neurological deficits  Psych: Schizophrenia  Assessment/plan-  Hypertension- stable  c/w ASA  Hyperlipidemia-  Diabetes type 2-Hba1c 6.1  c/w Metformin  Schizophrenia-continue with fluphenazine  Follow-up with psych  Weakness- c/wsupportive care

## 2024-03-07 ENCOUNTER — NURSING HOME VISIT (OUTPATIENT)
Dept: POST ACUTE CARE | Facility: EXTERNAL LOCATION | Age: 64
End: 2024-03-07
Payer: MEDICAID

## 2024-03-07 DIAGNOSIS — F20.0 CHRONIC PARANOID SCHIZOPHRENIA (MULTI): ICD-10-CM

## 2024-03-07 DIAGNOSIS — F29 PSYCHOSIS, UNSPECIFIED PSYCHOSIS TYPE (MULTI): ICD-10-CM

## 2024-03-07 DIAGNOSIS — E11.9 TYPE 2 DIABETES MELLITUS WITHOUT COMPLICATION, WITHOUT LONG-TERM CURRENT USE OF INSULIN (MULTI): Primary | ICD-10-CM

## 2024-03-07 DIAGNOSIS — F33.9 RECURRENT MAJOR DEPRESSIVE DISORDER, REMISSION STATUS UNSPECIFIED (CMS-HCC): ICD-10-CM

## 2024-03-07 DIAGNOSIS — Z91.199 NONCOMPLIANCE WITH DIAGNOSTIC TESTING: ICD-10-CM

## 2024-03-07 PROCEDURE — 99308 SBSQ NF CARE LOW MDM 20: CPT | Performed by: NURSE PRACTITIONER

## 2024-03-07 NOTE — LETTER
Patient: Carter Jefferson  : 1960    Encounter Date: 2024    Name: Carter Jeffesron    YOB: 1960    Code Status: Full Code    Chief Complaint:   Follow up on DM 2; etc....    HPI       63 year old male at [facility] in Long Term Care.  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     Diagnoses as follows:    Type 2 Diabetes;  noncompliance with diagnostic tests:  On Metformin 500 mg PO BID.               Ha1c trends:  21 HA1c 5.6 %   10/12/21 HA1c 5.5 %   5/3/2023: HA1c 5%  2023 Ha1c 5.3 %  2023: Hemoglobin A1c 6.1%  Monitoring Ha1c q 3 months.  Recent Ha1c was not completed because patient refused.  No hypoglycemia reported.  Patient seen today he is in bed.  Calm, cooperative.  Mentation at baseline.  No complaints of chest pain, headaches or dizziness.  No nausea or vomiting.   No abdominal pain.             Paranoid Schizophrenia;  unspecified psychosis; Major Depressive Disorder; :   On Fluphenazine.  No psychosis or agitation reported.   Nursing reports he is  compliant with taking his medications.  His mentation is at baseline.  No recent agitation reported by staff.  Patient generally sleeps a lot and likes to stay in bed.                         Reviewed  EMR  Reviewed medical, social, surgical and family history.  Reviewed all current medications and performed medication  reconciliation.  Performed prescription drug management.  Reviewed vital signs AND lab results  Reviewed [facility] Documentation  Discussed patient with nursing.     ROS: 10 point ROS performed; Negative unless noted in HPI.      Surgical History  Problems    · No history of surgery    Family History  Mother    · No pertinent family history    Social History   · Current every day smoker   · Light cigarette smoker    · Lives in long-term care facility    · No illicit drug use   · Quit consuming alcohol in remote past    Allergies  NoKnown    · No Known Allergies      Outside Labs:  CBC: Date: 10/19/21, WBC: 11.6, Hgb: 12, Hct: 38.5, PLT: 310   CBC: Date: 10/12/21, WBC: 13.2, Hgb: 11.9, Hct: 38.2, PLT: 305   BMP: Date: 10/19/21, Na: 140, K: 4.2, Cl: 104, CO2: 26, BUN: 17, Cr: 1.0, Glu: 51, Ca: 9.7   BMP: Date: 10/12/21, Na: 140, K: 4.3, Cl: 103, CO2: 27, BUN: 17, Cr: 1.1, Glu: 80, Ca: 9.7   Hepatic Function Tests: Date: 10/12/21, AST: 12, ALT: 12, Alk Phos: 44, T Bili: 0.3, Albumin: 4.0   Hepatic Function Tests: Date: 10/19/21, AST: 12, ALT: 11, Alk Phos: 40, T Bili: 0.3, Albumin: 3.9   11/18/20 HA1 C 5.6 %    10/12/21 HA1c 5.5 %.     BMP: Date: 5/3/2023 Na 141 K 4.2 Cr 0.9 BUN 17 glucose 49 Ca 9.6   CBC: Date: 5/3/2023 WBC 13.3 Hgb 11.3 hematocrit 36.5 platelets 323  Liver function: Date: 5/3/2023 ALT 7 AST 11 alkaline phos.  47 bilirubin 0.4 albumin 3.8 protein 8.2  Date: 5/3/2023: Lipid panel: Cholesterol 137; triglycerides 65; HDL 31; LDL 93.  Date 5/3/2023: HA1c 5%    BMP: Date: 8/4/2023 Na 140 K 4.1 Cr 1.1 BUN 18 glucose 44 Ca 9.2 GFR 82  CBC: Date: 8/4/2023 WBC 10.6 Hgb 10.5 hematocrit 33.3 platelets 388  Liver function: Date: 8/4/2023 ALT 5 AST 11 alkaline phos.  57 bilirubin 0.3 albumin 3.7 protein 7.9    8/4/2023 hemoglobin A1c 5.3%    8/4/2023 vitamin D 25-hydroxy level 49.22 WNL    BMP: Date: 11/7/2023 Na 140 K 4.7 Cr 1.1 BUN 18 glucose 96 Ca 9.4 GFR 82  CBC: Date: 11/7/2023 WBC 10.1 Hgb 11  "hematocrit 35.4 platelets 278    11/7/2023: Hemoglobin A1c 6.1%                Lab Results   Component Value Date    WBC 17.2 (H) 11/11/2020    HGB 12.8 (L) 11/11/2020    HCT 41.9 11/11/2020     11/11/2020    ALT 12 11/11/2020    AST 14 11/11/2020     11/11/2020    K 3.7 11/11/2020     11/11/2020    CREATININE 0.96 11/11/2020    BUN 14 11/11/2020    CO2 27 11/11/2020    HGBA1C 5.6 11/11/2020             /77   Pulse 57   Temp 36.6 °C (97.8 °F)   Resp 18   Ht 1.753 m (5' 9\")   Wt 68.4 kg (150 lb 12.8 oz)   SpO2 98%   BMI 22.27 kg/m²      Physical Exam  Vitals and nursing note reviewed.   Constitutional:       General: He is awake.      Appearance: He is underweight.      Comments: Chronically ill   HENT:      Head: Normocephalic.      Right Ear: External ear normal.      Left Ear: External ear normal.      Nose: Nose normal.      Mouth/Throat:      Mouth: Mucous membranes are moist.      Pharynx: Oropharynx is clear.   Eyes:      Extraocular Movements: Extraocular movements intact.      Conjunctiva/sclera: Conjunctivae normal.      Pupils: Pupils are equal, round, and reactive to light.   Cardiovascular:      Rate and Rhythm: Normal rate and regular rhythm.      Pulses: Normal pulses.      Heart sounds: Normal heart sounds.   Pulmonary:      Effort: Pulmonary effort is normal.      Breath sounds: Normal breath sounds.   Abdominal:      General: Bowel sounds are normal.      Palpations: Abdomen is soft.   Musculoskeletal:         General: Normal range of motion.      Cervical back: Normal range of motion and neck supple.   Skin:     General: Skin is warm and dry.   Neurological:      Mental Status: He is alert and oriented to person, place, and time. Mental status is at baseline.      Motor: Weakness present.   Psychiatric:         Mood and Affect: Mood normal. Affect is flat.         Behavior: Behavior is slowed. Behavior is not agitated or aggressive. Behavior is cooperative.         " Judgment: Judgment is not inappropriate.          Assessment/Plan  Ordered CMP, CBC with diff., Ha1c, lipid panel for 3/11/24.      DM 2; noncompliance with diagnostic tests:  Continue Metformin 500 mg PO BID.  Monitor Ha1c Q 3 months.  Continue to eat diabetic diet.  Staff to encourage patient to allow bloodwork.             Paranoid Schizophrenia; unspecified psychosis; Major Depressive Disorder  Continue Fluphenazine.  Monitor for psychosis or agitation.          Problem List Items Addressed This Visit          Mental Health    Chronic paranoid schizophrenia (CMS/HCC)    Major depressive disorder     Other Visit Diagnoses       Type 2 diabetes mellitus without complication, without long-term current use of insulin (CMS/HCC)    -  Primary    Noncompliance with diagnostic testing        Psychosis, unspecified psychosis type (CMS/HCC)            GARY Valentine-JOSUE       Electronically Signed By: CHON Valentine   3/11/24 12:39 AM

## 2024-03-11 VITALS
HEART RATE: 57 BPM | SYSTOLIC BLOOD PRESSURE: 127 MMHG | DIASTOLIC BLOOD PRESSURE: 77 MMHG | HEIGHT: 69 IN | RESPIRATION RATE: 18 BRPM | WEIGHT: 150.8 LBS | BODY MASS INDEX: 22.33 KG/M2 | TEMPERATURE: 97.8 F | OXYGEN SATURATION: 98 %

## 2024-03-11 NOTE — PROGRESS NOTES
Name: Carter Jefferson    YOB: 1960    Code Status: Full Code    Chief Complaint:   Follow up on DM 2; etc....    HPI       63 year old male at [facility] in Long Term [facility].  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     Diagnoses as follows:    Type 2 Diabetes;  noncompliance with diagnostic tests:  On Metformin 500 mg PO BID.               Ha1c trends:  11/18/21 HA1c 5.6 %   10/12/21 HA1c 5.5 %   5/3/2023: HA1c 5%  8/4/2023 Ha1c 5.3 %  11/7/2023: Hemoglobin A1c 6.1%  Monitoring Ha1c q 3 months.  Recent Ha1c was not completed because patient refused.  No hypoglycemia reported.  Patient seen today he is in bed.  Calm, cooperative.  Mentation at baseline.  No complaints of chest pain, headaches or dizziness.  No nausea or vomiting.   No abdominal pain.             Paranoid Schizophrenia;  unspecified psychosis; Major Depressive Disorder; :   On Fluphenazine.  No psychosis or agitation reported.   Nursing reports he is  compliant with taking his medications.  His mentation is at baseline.  No recent agitation reported by staff.  Patient generally sleeps a lot and likes to stay in bed.                         Reviewed  EMR  Reviewed medical, social, surgical and family history.  Reviewed all current medications and performed medication reconciliation.  Performed prescription drug management.  Reviewed vital  signs AND lab results  Reviewed [facility] Documentation  Discussed patient with nursing.     ROS: 10 point ROS performed; Negative unless noted in HPI.      Surgical History  Problems    · No history of surgery    Family History  Mother    · No pertinent family history    Social History   · Current every day smoker   · Light cigarette smoker    · Lives in long-term care facility    · No illicit drug use   · Quit consuming alcohol in remote past    Allergies  NoKnown    · No Known Allergies      Outside Labs:  CBC: Date: 10/19/21, WBC: 11.6, Hgb: 12, Hct: 38.5, PLT: 310   CBC: Date: 10/12/21, WBC: 13.2, Hgb: 11.9, Hct: 38.2, PLT: 305   BMP: Date: 10/19/21, Na: 140, K: 4.2, Cl: 104, CO2: 26, BUN: 17, Cr: 1.0, Glu: 51, Ca: 9.7   BMP: Date: 10/12/21, Na: 140, K: 4.3, Cl: 103, CO2: 27, BUN: 17, Cr: 1.1, Glu: 80, Ca: 9.7   Hepatic Function Tests: Date: 10/12/21, AST: 12, ALT: 12, Alk Phos: 44, T Bili: 0.3, Albumin: 4.0   Hepatic Function Tests: Date: 10/19/21, AST: 12, ALT: 11, Alk Phos: 40, T Bili: 0.3, Albumin: 3.9   11/18/20 HA1 C 5.6 %    10/12/21 HA1c 5.5 %.     BMP: Date: 5/3/2023 Na 141 K 4.2 Cr 0.9 BUN 17 glucose 49 Ca 9.6   CBC: Date: 5/3/2023 WBC 13.3 Hgb 11.3 hematocrit 36.5 platelets 323  Liver function: Date: 5/3/2023 ALT 7 AST 11 alkaline phos.  47 bilirubin 0.4 albumin 3.8 protein 8.2  Date: 5/3/2023: Lipid panel: Cholesterol 137; triglycerides 65; HDL 31; LDL 93.  Date 5/3/2023: HA1c 5%    BMP: Date: 8/4/2023 Na 140 K 4.1 Cr 1.1 BUN 18 glucose 44 Ca 9.2 GFR 82  CBC: Date: 8/4/2023 WBC 10.6 Hgb 10.5 hematocrit 33.3 platelets 388  Liver function: Date: 8/4/2023 ALT 5 AST 11 alkaline phos.  57 bilirubin 0.3 albumin 3.7 protein 7.9    8/4/2023 hemoglobin A1c 5.3%    8/4/2023 vitamin D 25-hydroxy level 49.22 WNL    BMP: Date: 11/7/2023 Na 140 K 4.7 Cr 1.1 BUN 18 glucose 96 Ca 9.4 GFR 82  CBC: Date: 11/7/2023 WBC 10.1 Hgb 11 hematocrit 35.4 platelets 278    11/7/2023: Hemoglobin A1c  "6.1%                Lab Results   Component Value Date    WBC 17.2 (H) 11/11/2020    HGB 12.8 (L) 11/11/2020    HCT 41.9 11/11/2020     11/11/2020    ALT 12 11/11/2020    AST 14 11/11/2020     11/11/2020    K 3.7 11/11/2020     11/11/2020    CREATININE 0.96 11/11/2020    BUN 14 11/11/2020    CO2 27 11/11/2020    HGBA1C 5.6 11/11/2020             /77   Pulse 57   Temp 36.6 °C (97.8 °F)   Resp 18   Ht 1.753 m (5' 9\")   Wt 68.4 kg (150 lb 12.8 oz)   SpO2 98%   BMI 22.27 kg/m²      Physical Exam  Vitals and nursing note reviewed.   Constitutional:       General: He is awake.      Appearance: He is underweight.      Comments: Chronically ill   HENT:      Head: Normocephalic.      Right Ear: External ear normal.      Left Ear: External ear normal.      Nose: Nose normal.      Mouth/Throat:      Mouth: Mucous membranes are moist.      Pharynx: Oropharynx is clear.   Eyes:      Extraocular Movements: Extraocular movements intact.      Conjunctiva/sclera: Conjunctivae normal.      Pupils: Pupils are equal, round, and reactive to light.   Cardiovascular:      Rate and Rhythm: Normal rate and regular rhythm.      Pulses: Normal pulses.      Heart sounds: Normal heart sounds.   Pulmonary:      Effort: Pulmonary effort is normal.      Breath sounds: Normal breath sounds.   Abdominal:      General: Bowel sounds are normal.      Palpations: Abdomen is soft.   Musculoskeletal:         General: Normal range of motion.      Cervical back: Normal range of motion and neck supple.   Skin:     General: Skin is warm and dry.   Neurological:      Mental Status: He is alert and oriented to person, place, and time. Mental status is at baseline.      Motor: Weakness present.   Psychiatric:         Mood and Affect: Mood normal. Affect is flat.         Behavior: Behavior is slowed. Behavior is not agitated or aggressive. Behavior is cooperative.         Judgment: Judgment is not inappropriate.      "     Assessment/Plan   Ordered CMP, CBC with diff., Ha1c, lipid panel for 3/11/24.      DM 2; noncompliance with diagnostic tests:  Continue Metformin 500 mg PO BID.  Monitor Ha1c Q 3 months.  Continue to eat diabetic diet.  Staff to encourage patient to allow bloodwork.             Paranoid Schizophrenia; unspecified psychosis; Major Depressive Disorder  Continue Fluphenazine.  Monitor for psychosis or agitation.          Problem List Items Addressed This Visit          Mental Health    Chronic paranoid schizophrenia (CMS/HCC)    Major depressive disorder     Other Visit Diagnoses       Type 2 diabetes mellitus without complication, without long-term current use of insulin (CMS/HCC)    -  Primary    Noncompliance with diagnostic testing        Psychosis, unspecified psychosis type (CMS/HCC)            Bethany Jones, GARY-CNP

## 2024-04-16 ENCOUNTER — NURSING HOME VISIT (OUTPATIENT)
Dept: POST ACUTE CARE | Facility: EXTERNAL LOCATION | Age: 64
End: 2024-04-16
Payer: MEDICAID

## 2024-04-16 DIAGNOSIS — E11.9 TYPE 2 DIABETES MELLITUS WITHOUT COMPLICATION, WITHOUT LONG-TERM CURRENT USE OF INSULIN (MULTI): ICD-10-CM

## 2024-04-16 DIAGNOSIS — D72.829 LEUKOCYTOSIS, UNSPECIFIED TYPE: Primary | ICD-10-CM

## 2024-04-16 DIAGNOSIS — L02.31 ABSCESS OF BUTTOCK: ICD-10-CM

## 2024-04-16 DIAGNOSIS — M19.90 OSTEOARTHRITIS, UNSPECIFIED OSTEOARTHRITIS TYPE, UNSPECIFIED SITE: ICD-10-CM

## 2024-04-16 DIAGNOSIS — F20.0 CHRONIC PARANOID SCHIZOPHRENIA (MULTI): ICD-10-CM

## 2024-04-16 DIAGNOSIS — F29 PSYCHOSIS, UNSPECIFIED PSYCHOSIS TYPE (MULTI): ICD-10-CM

## 2024-04-16 DIAGNOSIS — Z91.199 NONCOMPLIANCE WITH DIAGNOSTIC TESTING: ICD-10-CM

## 2024-04-16 DIAGNOSIS — F33.9 RECURRENT MAJOR DEPRESSIVE DISORDER, REMISSION STATUS UNSPECIFIED (CMS-HCC): ICD-10-CM

## 2024-04-16 PROCEDURE — 99309 SBSQ NF CARE MODERATE MDM 30: CPT | Performed by: NURSE PRACTITIONER

## 2024-04-16 NOTE — LETTER
Patient: Carter Jefferson  : 1960    Encounter Date: 2024    Name: Carter Jefferson    YOB: 1960    Code Status: Full Code    Chief Complaint:   Leukocytosis; abscess on buttocks; etc....    HPI       64 year old male at [facility] in Long Term [facility].  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     Diagnoses as follows:    Leukocytosis; abscess on buttocks:  Patient has elevated WBC.  Recent WBC as follows:  4/10/24 WBC 17.7  24 WBC 11.3  WBC improving since started cephalexin.  He was started on cephalexin on 4/10/24--end date is 24.  Patient states the area is improving.  Patient stated he does not want me to look at the area today.  Patient seen today he is in bed.  Calm and cooperative, but not agreeable to exam of his buttocks.  Mentation at baseline.         Type 2 Diabetes;  noncompliance with diagnostic tests:  On Metformin 500 mg PO BID.               Ha1c trends:  21 HA1c 5.6 %   10/12/21 HA1c 5.5 %   5/3/2023: HA1c 5%  2023 Ha1c 5.3 %  2023: Hemoglobin A1c 6.1%  Monitoring Ha1c q 3 months.  Recent Ha1c was not completed because patient refused.  No hypoglycemia reported.  No complaints of chest pain, headaches or dizziness.  No nausea or vomiting.   No abdominal pain.    Osteoarthritis:   Has acetaminophen PRN for pain.   No complaints of any OA related pain  today.  No reports of patient complaining of pain from staff.  He ambulates well without any devices.  He does not walk around frequently.  No recent falls reported.                Paranoid Schizophrenia;   Major Depressive Disorder; unspecified psychosis :   On Fluphenazine.  No psychosis or agitation reported.   Nursing reports he is  compliant with taking his medications.  His mentation is at baseline.  No recent agitation reported by staff.  Patient generally sleeps a lot and likes to stay in bed.                               Reviewed  EMR  Reviewed medical, social, surgical and family history.  Reviewed all current medications and performed medication reconciliation.  Performed prescription drug management.  Reviewed vital signs AND lab results  Reviewed [facility] Documentation  Discussed patient with nursing.     ROS: 10 point ROS performed; Negative unless noted in HPI.      Surgical History  Problems    · No history of surgery    Family History  Mother    · No pertinent family history    Social History   · Current every day smoker   · Light cigarette smoker    · Lives in long-term care facility    · No illicit drug use   · Quit consuming alcohol in remote past    Allergies  NoKnown    · No Known Allergies      Outside Labs:  CBC: Date: 10/19/21, WBC: 11.6, Hgb: 12, Hct: 38.5, PLT: 310   CBC: Date: 10/12/21, WBC: 13.2, Hgb: 11.9, Hct: 38.2, PLT: 305   BMP: Date: 10/19/21, Na: 140, K: 4.2, Cl: 104, CO2: 26, BUN: 17, Cr: 1.0, Glu: 51, Ca: 9.7   BMP: Date: 10/12/21, Na: 140, K: 4.3, Cl: 103, CO2: 27, BUN: 17, Cr: 1.1, Glu: 80, Ca: 9.7   Hepatic Function Tests: Date: 10/12/21, AST: 12, ALT: 12, Alk Phos: 44, T Bili: 0.3, Albumin: 4.0   Hepatic Function Tests: Date: 10/19/21, AST: 12, ALT: 11, Alk Phos: 40, T Bili: 0.3, Albumin: 3.9   11/18/20 HA1 C 5.6 %    10/12/21 HA1c 5.5 %.     BMP: Date: 5/3/2023 Na 141 K 4.2 Cr 0.9 BUN 17 glucose 49 Ca 9.6   CBC: Date: 5/3/2023 WBC 13.3 Hgb 11.3 hematocrit  "36.5 platelets 323  Liver function: Date: 5/3/2023 ALT 7 AST 11 alkaline phos.  47 bilirubin 0.4 albumin 3.8 protein 8.2  Date: 5/3/2023: Lipid panel: Cholesterol 137; triglycerides 65; HDL 31; LDL 93.  Date 5/3/2023: HA1c 5%    BMP: Date: 8/4/2023 Na 140 K 4.1 Cr 1.1 BUN 18 glucose 44 Ca 9.2 GFR 82  CBC: Date: 8/4/2023 WBC 10.6 Hgb 10.5 hematocrit 33.3 platelets 388  Liver function: Date: 8/4/2023 ALT 5 AST 11 alkaline phos.  57 bilirubin 0.3 albumin 3.7 protein 7.9    8/4/2023 hemoglobin A1c 5.3%    8/4/2023 vitamin D 25-hydroxy level 49.22 WNL    BMP: Date: 11/7/2023 Na 140 K 4.7 Cr 1.1 BUN 18 glucose 96 Ca 9.4 GFR 82  CBC: Date: 11/7/2023 WBC 10.1 Hgb 11 hematocrit 35.4 platelets 278    11/7/2023: Hemoglobin A1c 6.1%    BMP: Date: 4/10/2024 Na 136 K 4.5 Cr 1.1 BUN 13 glucose 171 Ca 9 GFR 82  CBC: Date: 4/10/2024 WBC 17.7 Hgb 10.1 hematocrit 31.7 platelets 510    BMP: Date: 4/12/2024 Na 138 K 4.5 Cr 1.1 BUN 12 glucose 171 Ca 9 GFR 82  CBC: Date: 4/12/2024 WBC 11.5 Hgb 10.2 hematocrit 31.3 platelets 512          Lab Results   Component Value Date    WBC 17.2 (H) 11/11/2020    HGB 12.8 (L) 11/11/2020    HCT 41.9 11/11/2020     11/11/2020    ALT 12 11/11/2020    AST 14 11/11/2020     11/11/2020    K 3.7 11/11/2020     11/11/2020    CREATININE 0.96 11/11/2020    BUN 14 11/11/2020    CO2 27 11/11/2020    HGBA1C 5.6 11/11/2020             /77   Pulse 83   Temp 36.5 °C (97.7 °F)   Resp 20   Ht 1.763 m (5' 9.4\")   Wt 68.8 kg (151 lb 9.6 oz)   SpO2 95%   BMI 22.13 kg/m²      Physical Exam  Vitals and nursing note reviewed.   Constitutional:       General: He is awake.      Appearance: He is underweight.      Comments: Chronically ill   HENT:      Head: Normocephalic.      Right Ear: External ear normal.      Left Ear: External ear normal.      Nose: Nose normal.      Mouth/Throat:      Mouth: Mucous membranes are moist.      Pharynx: Oropharynx is clear.   Eyes:      Extraocular Movements: " Extraocular movements intact.      Conjunctiva/sclera: Conjunctivae normal.      Pupils: Pupils are equal, round, and reactive to light.   Cardiovascular:      Rate and Rhythm: Normal rate and regular rhythm.      Pulses: Normal pulses.      Heart sounds: Normal heart sounds.   Pulmonary:      Effort: Pulmonary effort is normal.      Breath sounds: Normal breath sounds.   Abdominal:      General: Bowel sounds are normal.      Palpations: Abdomen is soft.   Musculoskeletal:         General: Normal range of motion.      Cervical back: Normal range of motion and neck supple.   Skin:     General: Skin is warm and dry.   Neurological:      Mental Status: He is alert and oriented to person, place, and time. Mental status is at baseline.      Motor: Weakness present.   Psychiatric:         Mood and Affect: Mood normal. Affect is flat.         Behavior: Behavior is slowed. Behavior is not agitated or aggressive. Behavior is cooperative.         Judgment: Judgment is not inappropriate.          Assessment/Plan  Ordered CBC with diff. And BMP for tomorrow.    Leukocytosis; abscess on buttocks:  Monitor WBC.  Continue cephalexin as ordered.  Wound nurse to follow up with patient to evaluate if abscess is resolving.        DM 2; noncompliance with diagnostic tests:  Continue Metformin 500 mg PO BID.  Monitor Ha1c Q 3 months.  Continue to eat diabetic diet.  Staff to encourage patient to allow bloodwork.    Osteoarthritis:   Continue acetaminophen PRN for pain.   He ambulates well without any devices.  Fall prevention strategies.             Paranoid Schizophrenia; Major Depressive Disorder; unspecified psychosis:  Continue Fluphenazine.  Monitor for psychosis or agitation.      Problem List Items Addressed This Visit          Mental Health    Chronic paranoid schizophrenia (Multi)    Major depressive disorder     Other Visit Diagnoses       Leukocytosis, unspecified type    -  Primary    Abscess of buttock        Type 2  diabetes mellitus without complication, without long-term current use of insulin (Multi)        Noncompliance with diagnostic testing        Osteoarthritis, unspecified osteoarthritis type, unspecified site        Psychosis, unspecified psychosis type (Multi)              CHON Valentine       Electronically Signed By: CHON Valentine   4/21/24  4:03 PM

## 2024-04-21 VITALS
HEART RATE: 83 BPM | TEMPERATURE: 97.7 F | DIASTOLIC BLOOD PRESSURE: 77 MMHG | SYSTOLIC BLOOD PRESSURE: 127 MMHG | HEIGHT: 69 IN | RESPIRATION RATE: 20 BRPM | WEIGHT: 151.6 LBS | BODY MASS INDEX: 22.45 KG/M2 | OXYGEN SATURATION: 95 %

## 2024-05-21 ENCOUNTER — NURSING HOME VISIT (OUTPATIENT)
Dept: POST ACUTE CARE | Facility: EXTERNAL LOCATION | Age: 64
End: 2024-05-21
Payer: MEDICAID

## 2024-05-21 DIAGNOSIS — Z91.199 NONCOMPLIANCE WITH DIAGNOSTIC TESTING: ICD-10-CM

## 2024-05-21 DIAGNOSIS — E11.9 TYPE 2 DIABETES MELLITUS WITHOUT COMPLICATION, WITHOUT LONG-TERM CURRENT USE OF INSULIN (MULTI): Primary | ICD-10-CM

## 2024-05-21 DIAGNOSIS — F20.0 CHRONIC PARANOID SCHIZOPHRENIA (MULTI): ICD-10-CM

## 2024-05-21 DIAGNOSIS — F29 PSYCHOSIS, UNSPECIFIED PSYCHOSIS TYPE (MULTI): ICD-10-CM

## 2024-05-21 DIAGNOSIS — F33.9 RECURRENT MAJOR DEPRESSIVE DISORDER, REMISSION STATUS UNSPECIFIED (CMS-HCC): ICD-10-CM

## 2024-05-21 PROCEDURE — 99308 SBSQ NF CARE LOW MDM 20: CPT | Performed by: NURSE PRACTITIONER

## 2024-05-21 NOTE — LETTER
Patient: Carter Jefferson  : 1960    Encounter Date: 2024    Name: Carter Jefferson    YOB: 1960    Code Status: Full Code    Chief Complaint:   Follow up on DM 2; etc....    HPI       64 year old male at [facility] in [facility].  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     Diagnoses as follows:     Type 2 Diabetes;  noncompliance with diagnostic tests:  On Metformin 500 mg PO BID.               Ha1c trends:  21 HA1c 5.6 %   10/12/21 HA1c 5.5 %   5/3/2023: HA1c 5%  2023 Ha1c 5.3 %  2023: Hemoglobin A1c 6.1%  Monitoring Ha1c q 3 months.  Recent Ha1c was not completed because patient refused.  No hypoglycemia reported.  No complaints of chest pain, headaches or dizziness.  No nausea or vomiting.   No abdominal pain.  Patient seen today.  Calm, cooperative.  Mentation at baseline.                            Major Depressive Disorder;  Paranoid Schizophrenia ; unspecified psychosis :   On Fluphenazine.  No psychosis or agitation reported.   Nursing reports he is  compliant with taking his medications.  His mentation is at baseline.  No recent agitation reported by staff.  Patient generally sleeps a lot and likes to stay in bed.                               Reviewed  EMR  Reviewed medical, social, surgical and family history.  Reviewed all current medications and performed  medication reconciliation.  Performed prescription drug management.  Reviewed vital signs AND lab results  Reviewed [facility] Documentation  Discussed patient with nursing.     ROS: 10 point ROS performed; Negative unless noted in HPI.      Surgical History  Problems    · No history of surgery    Family History  Mother    · No pertinent family history    Social History   · Current every day smoker   · Light cigarette smoker    · Lives in long-term care facility    · No illicit drug use   · Quit consuming alcohol in remote past    Allergies  NoKnown    · No Known Allergies      Outside Labs:  CBC: Date: 10/19/21, WBC: 11.6, Hgb: 12, Hct: 38.5, PLT: 310   CBC: Date: 10/12/21, WBC: 13.2, Hgb: 11.9, Hct: 38.2, PLT: 305   BMP: Date: 10/19/21, Na: 140, K: 4.2, Cl: 104, CO2: 26, BUN: 17, Cr: 1.0, Glu: 51, Ca: 9.7   BMP: Date: 10/12/21, Na: 140, K: 4.3, Cl: 103, CO2: 27, BUN: 17, Cr: 1.1, Glu: 80, Ca: 9.7   Hepatic Function Tests: Date: 10/12/21, AST: 12, ALT: 12, Alk Phos: 44, T Bili: 0.3, Albumin: 4.0   Hepatic Function Tests: Date: 10/19/21, AST: 12, ALT: 11, Alk Phos: 40, T Bili: 0.3, Albumin: 3.9   11/18/20 HA1 C 5.6 %    10/12/21 HA1c 5.5 %.     BMP: Date: 5/3/2023 Na 141 K 4.2 Cr 0.9 BUN 17 glucose 49 Ca 9.6   CBC: Date: 5/3/2023 WBC 13.3 Hgb 11.3 hematocrit 36.5 platelets 323  Liver function: Date: 5/3/2023 ALT 7 AST 11 alkaline phos.  47 bilirubin 0.4 albumin 3.8 protein 8.2  Date: 5/3/2023: Lipid panel: Cholesterol 137; triglycerides 65; HDL 31; LDL 93.  Date 5/3/2023: HA1c 5%    BMP: Date: 8/4/2023 Na 140 K 4.1 Cr 1.1 BUN 18 glucose 44 Ca 9.2 GFR 82  CBC: Date: 8/4/2023 WBC 10.6 Hgb 10.5 hematocrit 33.3 platelets 388  Liver function: Date: 8/4/2023 ALT 5 AST 11 alkaline phos.  57 bilirubin 0.3 albumin 3.7 protein 7.9    8/4/2023 hemoglobin A1c 5.3%    8/4/2023 vitamin D 25-hydroxy level 49.22 WNL    BMP: Date: 11/7/2023 Na 140 K 4.7 Cr 1.1 BUN 18 glucose 96 Ca 9.4 GFR 82  CBC: Date: 11/7/2023 WBC  "10.1 Hgb 11 hematocrit 35.4 platelets 278    11/7/2023: Hemoglobin A1c 6.1%    BMP: Date: 4/10/2024 Na 136 K 4.5 Cr 1.1 BUN 13 glucose 171 Ca 9 GFR 82  CBC: Date: 4/10/2024 WBC 17.7 Hgb 10.1 hematocrit 31.7 platelets 510    BMP: Date: 4/12/2024 Na 138 K 4.5 Cr 1.1 BUN 12 glucose 171 Ca 9 GFR 82  CBC: Date: 4/12/2024 WBC 11.5 Hgb 10.2 hematocrit 31.3 platelets 512          Lab Results   Component Value Date    WBC 17.2 (H) 11/11/2020    HGB 12.8 (L) 11/11/2020    HCT 41.9 11/11/2020     11/11/2020    ALT 12 11/11/2020    AST 14 11/11/2020     11/11/2020    K 3.7 11/11/2020     11/11/2020    CREATININE 0.96 11/11/2020    BUN 14 11/11/2020    CO2 27 11/11/2020    HGBA1C 5.6 11/11/2020             /65   Pulse 77   Temp 36.7 °C (98.1 °F)   Resp 18   Ht 1.763 m (5' 9.4\")   Wt 68.7 kg (151 lb 6.4 oz)   SpO2 95%   BMI 22.10 kg/m²      Physical Exam  Vitals and nursing note reviewed.   Constitutional:       General: He is awake.      Appearance: He is underweight.      Comments: Chronically ill   HENT:      Head: Normocephalic.      Right Ear: External ear normal.      Left Ear: External ear normal.      Nose: Nose normal.      Mouth/Throat:      Mouth: Mucous membranes are moist.      Pharynx: Oropharynx is clear.   Eyes:      Extraocular Movements: Extraocular movements intact.      Conjunctiva/sclera: Conjunctivae normal.      Pupils: Pupils are equal, round, and reactive to light.   Cardiovascular:      Rate and Rhythm: Normal rate and regular rhythm.      Pulses: Normal pulses.      Heart sounds: Normal heart sounds.   Pulmonary:      Effort: Pulmonary effort is normal.      Breath sounds: Normal breath sounds.   Abdominal:      General: Bowel sounds are normal.      Palpations: Abdomen is soft.   Musculoskeletal:         General: Normal range of motion.      Cervical back: Normal range of motion and neck supple.   Skin:     General: Skin is warm and dry.   Neurological:      Mental " Status: He is alert and oriented to person, place, and time. Mental status is at baseline.      Motor: Weakness present.   Psychiatric:         Mood and Affect: Mood normal. Affect is flat.         Behavior: Behavior is slowed. Behavior is not agitated or aggressive. Behavior is cooperative.         Judgment: Judgment is not inappropriate.          Assessment/Plan    Type 2 Diabetes;  noncompliance with diagnostic tests:  Continue Metformin 500 mg PO BID.              Order Ha1c.              Encourage compliance with diagnostic tests.                Major Depressive Disorder;  Paranoid Schizophrenia; unspecified psychosis :   Continue  Fluphenazine.  Continue psychosis or agitation reported.            Problem List Items Addressed This Visit          Mental Health    Chronic paranoid schizophrenia (Multi)    Major depressive disorder     Other Visit Diagnoses       Type 2 diabetes mellitus without complication, without long-term current use of insulin (Multi)    -  Primary    Noncompliance with diagnostic testing        Psychosis, unspecified psychosis type (Multi)                CHON Valentine       Electronically Signed By: CHON Valentine   5/24/24  3:52 PM

## 2024-05-24 VITALS
DIASTOLIC BLOOD PRESSURE: 65 MMHG | HEART RATE: 77 BPM | BODY MASS INDEX: 22.42 KG/M2 | OXYGEN SATURATION: 95 % | RESPIRATION RATE: 18 BRPM | WEIGHT: 151.4 LBS | SYSTOLIC BLOOD PRESSURE: 125 MMHG | HEIGHT: 69 IN | TEMPERATURE: 98.1 F

## 2024-05-24 NOTE — PROGRESS NOTES
Name: Carter Jefferson    YOB: 1960    Code Status: Full Code    Chief Complaint:   Follow up on DM 2; etc....    HPI       64 year old male at [facility] in Long Term [facility].  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     Diagnoses as follows:     Type 2 Diabetes;  noncompliance with diagnostic tests:  On Metformin 500 mg PO BID.               Ha1c trends:  11/18/21 HA1c 5.6 %   10/12/21 HA1c 5.5 %   5/3/2023: HA1c 5%  8/4/2023 Ha1c 5.3 %  11/7/2023: Hemoglobin A1c 6.1%  Monitoring Ha1c q 3 months.  Recent Ha1c was not completed because patient refused.  No hypoglycemia reported.  No complaints of chest pain, headaches or dizziness.  No nausea or vomiting.   No abdominal pain.  Patient seen today.  Calm, cooperative.  Mentation at baseline.                            Major Depressive Disorder;  Paranoid Schizophrenia ; unspecified psychosis :   On Fluphenazine.  No psychosis or agitation reported.   Nursing reports he is  compliant with taking his medications.  His mentation is at baseline.  No recent agitation reported by staff.  Patient generally sleeps a lot and likes to stay in bed.                               Reviewed  EMR  Reviewed medical, social, surgical and family history.  Reviewed all current medications and performed medication reconciliation.  Performed prescription drug management.  Reviewed  vital signs AND lab results  Reviewed [facility] Documentation  Discussed patient with nursing.     ROS: 10 point ROS performed; Negative unless noted in HPI.      Surgical History  Problems    · No history of surgery    Family History  Mother    · No pertinent family history    Social History   · Current every day smoker   · Light cigarette smoker    · Lives in long-term care facility    · No illicit drug use   · Quit consuming alcohol in remote past    Allergies  NoKnown    · No Known Allergies      Outside Labs:  CBC: Date: 10/19/21, WBC: 11.6, Hgb: 12, Hct: 38.5, PLT: 310   CBC: Date: 10/12/21, WBC: 13.2, Hgb: 11.9, Hct: 38.2, PLT: 305   BMP: Date: 10/19/21, Na: 140, K: 4.2, Cl: 104, CO2: 26, BUN: 17, Cr: 1.0, Glu: 51, Ca: 9.7   BMP: Date: 10/12/21, Na: 140, K: 4.3, Cl: 103, CO2: 27, BUN: 17, Cr: 1.1, Glu: 80, Ca: 9.7   Hepatic Function Tests: Date: 10/12/21, AST: 12, ALT: 12, Alk Phos: 44, T Bili: 0.3, Albumin: 4.0   Hepatic Function Tests: Date: 10/19/21, AST: 12, ALT: 11, Alk Phos: 40, T Bili: 0.3, Albumin: 3.9   11/18/20 HA1 C 5.6 %    10/12/21 HA1c 5.5 %.     BMP: Date: 5/3/2023 Na 141 K 4.2 Cr 0.9 BUN 17 glucose 49 Ca 9.6   CBC: Date: 5/3/2023 WBC 13.3 Hgb 11.3 hematocrit 36.5 platelets 323  Liver function: Date: 5/3/2023 ALT 7 AST 11 alkaline phos.  47 bilirubin 0.4 albumin 3.8 protein 8.2  Date: 5/3/2023: Lipid panel: Cholesterol 137; triglycerides 65; HDL 31; LDL 93.  Date 5/3/2023: HA1c 5%    BMP: Date: 8/4/2023 Na 140 K 4.1 Cr 1.1 BUN 18 glucose 44 Ca 9.2 GFR 82  CBC: Date: 8/4/2023 WBC 10.6 Hgb 10.5 hematocrit 33.3 platelets 388  Liver function: Date: 8/4/2023 ALT 5 AST 11 alkaline phos.  57 bilirubin 0.3 albumin 3.7 protein 7.9    8/4/2023 hemoglobin A1c 5.3%    8/4/2023 vitamin D 25-hydroxy level 49.22 WNL    BMP: Date: 11/7/2023 Na 140 K 4.7 Cr 1.1 BUN 18 glucose 96 Ca 9.4 GFR 82  CBC: Date: 11/7/2023 WBC 10.1 Hgb 11 hematocrit 35.4 platelets 278    11/7/2023: Hemoglobin A1c  "6.1%    BMP: Date: 4/10/2024 Na 136 K 4.5 Cr 1.1 BUN 13 glucose 171 Ca 9 GFR 82  CBC: Date: 4/10/2024 WBC 17.7 Hgb 10.1 hematocrit 31.7 platelets 510    BMP: Date: 4/12/2024 Na 138 K 4.5 Cr 1.1 BUN 12 glucose 171 Ca 9 GFR 82  CBC: Date: 4/12/2024 WBC 11.5 Hgb 10.2 hematocrit 31.3 platelets 512          Lab Results   Component Value Date    WBC 17.2 (H) 11/11/2020    HGB 12.8 (L) 11/11/2020    HCT 41.9 11/11/2020     11/11/2020    ALT 12 11/11/2020    AST 14 11/11/2020     11/11/2020    K 3.7 11/11/2020     11/11/2020    CREATININE 0.96 11/11/2020    BUN 14 11/11/2020    CO2 27 11/11/2020    HGBA1C 5.6 11/11/2020             /65   Pulse 77   Temp 36.7 °C (98.1 °F)   Resp 18   Ht 1.763 m (5' 9.4\")   Wt 68.7 kg (151 lb 6.4 oz)   SpO2 95%   BMI 22.10 kg/m²      Physical Exam  Vitals and nursing note reviewed.   Constitutional:       General: He is awake.      Appearance: He is underweight.      Comments: Chronically ill   HENT:      Head: Normocephalic.      Right Ear: External ear normal.      Left Ear: External ear normal.      Nose: Nose normal.      Mouth/Throat:      Mouth: Mucous membranes are moist.      Pharynx: Oropharynx is clear.   Eyes:      Extraocular Movements: Extraocular movements intact.      Conjunctiva/sclera: Conjunctivae normal.      Pupils: Pupils are equal, round, and reactive to light.   Cardiovascular:      Rate and Rhythm: Normal rate and regular rhythm.      Pulses: Normal pulses.      Heart sounds: Normal heart sounds.   Pulmonary:      Effort: Pulmonary effort is normal.      Breath sounds: Normal breath sounds.   Abdominal:      General: Bowel sounds are normal.      Palpations: Abdomen is soft.   Musculoskeletal:         General: Normal range of motion.      Cervical back: Normal range of motion and neck supple.   Skin:     General: Skin is warm and dry.   Neurological:      Mental Status: He is alert and oriented to person, place, and time. Mental status " is at baseline.      Motor: Weakness present.   Psychiatric:         Mood and Affect: Mood normal. Affect is flat.         Behavior: Behavior is slowed. Behavior is not agitated or aggressive. Behavior is cooperative.         Judgment: Judgment is not inappropriate.          Assessment/Plan     Type 2 Diabetes;  noncompliance with diagnostic tests:  Continue Metformin 500 mg PO BID.              Order Ha1c.              Encourage compliance with diagnostic tests.                Major Depressive Disorder;  Paranoid Schizophrenia; unspecified psychosis :   Continue  Fluphenazine.  Continue psychosis or agitation reported.            Problem List Items Addressed This Visit          Mental Health    Chronic paranoid schizophrenia (Multi)    Major depressive disorder     Other Visit Diagnoses       Type 2 diabetes mellitus without complication, without long-term current use of insulin (Multi)    -  Primary    Noncompliance with diagnostic testing        Psychosis, unspecified psychosis type (Multi)                Bethany Jones, APRN-CNP

## 2024-06-20 ENCOUNTER — NURSING HOME VISIT (OUTPATIENT)
Dept: POST ACUTE CARE | Facility: EXTERNAL LOCATION | Age: 64
End: 2024-06-20
Payer: MEDICAID

## 2024-06-20 DIAGNOSIS — F29 PSYCHOSIS, UNSPECIFIED PSYCHOSIS TYPE (MULTI): ICD-10-CM

## 2024-06-20 DIAGNOSIS — Z91.199 NONCOMPLIANCE WITH DIAGNOSTIC TESTING: ICD-10-CM

## 2024-06-20 DIAGNOSIS — F20.0 CHRONIC PARANOID SCHIZOPHRENIA (MULTI): Primary | ICD-10-CM

## 2024-06-20 DIAGNOSIS — F33.9 RECURRENT MAJOR DEPRESSIVE DISORDER, REMISSION STATUS UNSPECIFIED (CMS-HCC): ICD-10-CM

## 2024-06-20 DIAGNOSIS — E11.9 TYPE 2 DIABETES MELLITUS WITHOUT COMPLICATION, WITHOUT LONG-TERM CURRENT USE OF INSULIN (MULTI): ICD-10-CM

## 2024-06-20 PROCEDURE — 99308 SBSQ NF CARE LOW MDM 20: CPT | Performed by: NURSE PRACTITIONER

## 2024-06-23 VITALS
RESPIRATION RATE: 18 BRPM | OXYGEN SATURATION: 96 % | HEIGHT: 69 IN | TEMPERATURE: 98.2 F | WEIGHT: 152 LBS | HEART RATE: 62 BPM | SYSTOLIC BLOOD PRESSURE: 120 MMHG | BODY MASS INDEX: 22.51 KG/M2 | DIASTOLIC BLOOD PRESSURE: 76 MMHG

## 2024-06-24 NOTE — PROGRESS NOTES
Name: Carter Jefferson    YOB: 1960    Code Status: Full Code    Chief Complaint:   Follow up on Paranoid Schizophrenia; etc....    HPI       64 year old male at [facility] in Long Term [facility].  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     Diagnoses as follows:     Paranoid Schizophrenia; Major Depressive Disorder; unspecified psychosis :   On Fluphenazine.  No psychosis or agitation reported.   Nursing reports he is  compliant with taking his medications.  His mentation is at baseline.  No recent agitation reported by staff.  Patient generally sleeps a lot and likes to stay in bed.  Patient seen today.  Calm, cooperative.  Mentation at baseline.          Type 2 Diabetes;  noncompliance with diagnostic tests:  On Metformin 500 mg PO BID.               Ha1c trends:  11/18/21 HA1c 5.6 %   10/12/21 HA1c 5.5 %   5/3/2023: HA1c 5%  8/4/2023 Ha1c 5.3 %  11/7/2023: Hemoglobin A1c 6.1%  Monitoring Ha1c q 3 months.  Recent Ha1c was not completed because patient refused.  No hypoglycemia reported.  No complaints of chest pain, headaches or dizziness.  No nausea or vomiting.   No abdominal pain.                   Reviewed  EMR  Reviewed medical, social, surgical and family history.  Reviewed all current medications and performed medication reconciliation.  Performed prescription drug management.  Reviewed vital signs  AND lab results  Reviewed [facility] Documentation  Discussed patient with nursing.     ROS: 10 point ROS performed; Negative unless noted in HPI.      Surgical History  Problems    · No history of surgery    Family History  Mother    · No pertinent family history    Social History   · Current every day smoker   · Light cigarette smoker    · Lives in long-term care facility    · No illicit drug use   · Quit consuming alcohol in remote past    Allergies  NoKnown    · No Known Allergies      Outside Labs:  CBC: Date: 10/19/21, WBC: 11.6, Hgb: 12, Hct: 38.5, PLT: 310   CBC: Date: 10/12/21, WBC: 13.2, Hgb: 11.9, Hct: 38.2, PLT: 305   BMP: Date: 10/19/21, Na: 140, K: 4.2, Cl: 104, CO2: 26, BUN: 17, Cr: 1.0, Glu: 51, Ca: 9.7   BMP: Date: 10/12/21, Na: 140, K: 4.3, Cl: 103, CO2: 27, BUN: 17, Cr: 1.1, Glu: 80, Ca: 9.7   Hepatic Function Tests: Date: 10/12/21, AST: 12, ALT: 12, Alk Phos: 44, T Bili: 0.3, Albumin: 4.0   Hepatic Function Tests: Date: 10/19/21, AST: 12, ALT: 11, Alk Phos: 40, T Bili: 0.3, Albumin: 3.9   11/18/20 HA1 C 5.6 %    10/12/21 HA1c 5.5 %.     BMP: Date: 5/3/2023 Na 141 K 4.2 Cr 0.9 BUN 17 glucose 49 Ca 9.6   CBC: Date: 5/3/2023 WBC 13.3 Hgb 11.3 hematocrit 36.5 platelets 323  Liver function: Date: 5/3/2023 ALT 7 AST 11 alkaline phos.  47 bilirubin 0.4 albumin 3.8 protein 8.2  Date: 5/3/2023: Lipid panel: Cholesterol 137; triglycerides 65; HDL 31; LDL 93.  Date 5/3/2023: HA1c 5%    BMP: Date: 8/4/2023 Na 140 K 4.1 Cr 1.1 BUN 18 glucose 44 Ca 9.2 GFR 82  CBC: Date: 8/4/2023 WBC 10.6 Hgb 10.5 hematocrit 33.3 platelets 388  Liver function: Date: 8/4/2023 ALT 5 AST 11 alkaline phos.  57 bilirubin 0.3 albumin 3.7 protein 7.9    8/4/2023 hemoglobin A1c 5.3%    8/4/2023 vitamin D 25-hydroxy level 49.22 WNL    BMP: Date: 11/7/2023 Na 140 K 4.7 Cr 1.1 BUN 18 glucose 96 Ca 9.4 GFR 82  CBC: Date: 11/7/2023 WBC 10.1 Hgb 11 hematocrit 35.4 platelets 278    11/7/2023: Hemoglobin A1c 6.1%    BMP: Date:  "4/10/2024 Na 136 K 4.5 Cr 1.1 BUN 13 glucose 171 Ca 9 GFR 82  CBC: Date: 4/10/2024 WBC 17.7 Hgb 10.1 hematocrit 31.7 platelets 510    BMP: Date: 4/12/2024 Na 138 K 4.5 Cr 1.1 BUN 12 glucose 171 Ca 9 GFR 82  CBC: Date: 4/12/2024 WBC 11.5 Hgb 10.2 hematocrit 31.3 platelets 512          Lab Results   Component Value Date    WBC 17.2 (H) 11/11/2020    HGB 12.8 (L) 11/11/2020    HCT 41.9 11/11/2020     11/11/2020    ALT 12 11/11/2020    AST 14 11/11/2020     11/11/2020    K 3.7 11/11/2020     11/11/2020    CREATININE 0.96 11/11/2020    BUN 14 11/11/2020    CO2 27 11/11/2020    HGBA1C 5.6 11/11/2020             /76   Pulse 62   Temp 36.8 °C (98.2 °F)   Resp 18   Ht 1.763 m (5' 9.4\")   Wt 68.9 kg (152 lb)   SpO2 96%   BMI 22.19 kg/m²      Physical Exam  Vitals and nursing note reviewed.   Constitutional:       General: He is awake.      Appearance: He is underweight.      Comments: Chronically ill   HENT:      Head: Normocephalic.      Right Ear: External ear normal.      Left Ear: External ear normal.      Nose: Nose normal.      Mouth/Throat:      Mouth: Mucous membranes are moist.      Pharynx: Oropharynx is clear.   Eyes:      Extraocular Movements: Extraocular movements intact.      Conjunctiva/sclera: Conjunctivae normal.      Pupils: Pupils are equal, round, and reactive to light.   Cardiovascular:      Rate and Rhythm: Normal rate and regular rhythm.      Pulses: Normal pulses.      Heart sounds: Normal heart sounds.   Pulmonary:      Effort: Pulmonary effort is normal.      Breath sounds: Normal breath sounds.   Abdominal:      General: Bowel sounds are normal.      Palpations: Abdomen is soft.   Musculoskeletal:         General: Normal range of motion.      Cervical back: Normal range of motion and neck supple.   Skin:     General: Skin is warm and dry.   Neurological:      Mental Status: He is alert and oriented to person, place, and time. Mental status is at baseline.      Motor: " Weakness present.   Psychiatric:         Mood and Affect: Mood normal. Affect is flat.         Behavior: Behavior is slowed. Behavior is not agitated or aggressive. Behavior is cooperative.         Judgment: Judgment is not inappropriate.          Assessment/Plan       Paranoid Schizophrenia; Major Depressive Disorder; unspecified psychosis :   Continue  Fluphenazine.  Continue psychosis or agitation reported.       Type 2 Diabetes;  noncompliance with diagnostic tests:  Continue Metformin 500 mg PO BID.              Order Ha1c.              Encourage compliance with diagnostic tests.                   Problem List Items Addressed This Visit          Mental Health    Chronic paranoid schizophrenia (Multi) - Primary    Major depressive disorder     Other Visit Diagnoses       Psychosis, unspecified psychosis type (Multi)        Type 2 diabetes mellitus without complication, without long-term current use of insulin (Multi)        Noncompliance with diagnostic testing                  Bethany Jones, APRN-CNP

## 2024-07-16 ENCOUNTER — NURSING HOME VISIT (OUTPATIENT)
Dept: POST ACUTE CARE | Facility: EXTERNAL LOCATION | Age: 64
End: 2024-07-16
Payer: MEDICAID

## 2024-07-16 DIAGNOSIS — E11.9 TYPE 2 DIABETES MELLITUS WITHOUT COMPLICATION, WITHOUT LONG-TERM CURRENT USE OF INSULIN (MULTI): Primary | ICD-10-CM

## 2024-07-16 DIAGNOSIS — Z91.199 NONCOMPLIANCE WITH DIAGNOSTIC TESTING: ICD-10-CM

## 2024-07-16 DIAGNOSIS — F29 PSYCHOSIS, UNSPECIFIED PSYCHOSIS TYPE (MULTI): ICD-10-CM

## 2024-07-16 DIAGNOSIS — F33.9 RECURRENT MAJOR DEPRESSIVE DISORDER, REMISSION STATUS UNSPECIFIED (CMS-HCC): ICD-10-CM

## 2024-07-16 DIAGNOSIS — F20.0 CHRONIC PARANOID SCHIZOPHRENIA (MULTI): ICD-10-CM

## 2024-07-16 NOTE — LETTER
Patient: Carter Jefferson  : 1960    Encounter Date: 2024    Name: Carter Jefferson    YOB: 1960    Code Status: Full Code    Chief Complaint:   Follow up on DM 2; etc....    HPI       64 year old male at [facility] in [facility].  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     Diagnoses as follows:    Type 2 Diabetes;  noncompliance with diagnostic tests:  On Metformin 500 mg PO BID.               Ha1c trends:  21 HA1c 5.6 %   10/12/21 HA1c 5.5 %   5/3/2023: HA1c 5%  2023 Ha1c 5.3 %  2023: Hemoglobin A1c 6.1%  Monitoring Ha1c q 3 months.  Recent Ha1c was not completed because patient refused.  No hypoglycemia reported.  No complaints of chest pain, headaches or dizziness.  No nausea or vomiting.   No abdominal pain.  Patient seen today.  Calm, cooperative.  Mentation at baseline.           Major Depressive Disorder; Paranoid Schizophrenia;  unspecified psychosis :   On Fluphenazine.  No psychosis or agitation reported.   Nursing reports he is  compliant with taking his medications.  His mentation is at baseline.  No recent agitation reported by staff.  Patient generally sleeps a lot and likes to stay in bed.                     Reviewed  EMR  Reviewed medical, social, surgical and family history.  Reviewed all current medications and performed medication  reconciliation.  Performed prescription drug management.  Reviewed vital signs AND lab results  Reviewed [facility] Documentation  Discussed patient with nursing.     ROS: 10 point ROS performed; Negative unless noted in HPI.      Surgical History  Problems    · No history of surgery    Family History  Mother    · No pertinent family history    Social History   · Current every day smoker   · Light cigarette smoker    · Lives in long-term care facility    · No illicit drug use   · Quit consuming alcohol in remote past    Allergies  NoKnown    · No Known Allergies      Outside Labs:  CBC: Date: 10/19/21, WBC: 11.6, Hgb: 12, Hct: 38.5, PLT: 310   CBC: Date: 10/12/21, WBC: 13.2, Hgb: 11.9, Hct: 38.2, PLT: 305   BMP: Date: 10/19/21, Na: 140, K: 4.2, Cl: 104, CO2: 26, BUN: 17, Cr: 1.0, Glu: 51, Ca: 9.7   BMP: Date: 10/12/21, Na: 140, K: 4.3, Cl: 103, CO2: 27, BUN: 17, Cr: 1.1, Glu: 80, Ca: 9.7   Hepatic Function Tests: Date: 10/12/21, AST: 12, ALT: 12, Alk Phos: 44, T Bili: 0.3, Albumin: 4.0   Hepatic Function Tests: Date: 10/19/21, AST: 12, ALT: 11, Alk Phos: 40, T Bili: 0.3, Albumin: 3.9   11/18/20 HA1 C 5.6 %    10/12/21 HA1c 5.5 %.     BMP: Date: 5/3/2023 Na 141 K 4.2 Cr 0.9 BUN 17 glucose 49 Ca 9.6   CBC: Date: 5/3/2023 WBC 13.3 Hgb 11.3 hematocrit 36.5 platelets 323  Liver function: Date: 5/3/2023 ALT 7 AST 11 alkaline phos.  47 bilirubin 0.4 albumin 3.8 protein 8.2  Date: 5/3/2023: Lipid panel: Cholesterol 137; triglycerides 65; HDL 31; LDL 93.  Date 5/3/2023: HA1c 5%    BMP: Date: 8/4/2023 Na 140 K 4.1 Cr 1.1 BUN 18 glucose 44 Ca 9.2 GFR 82  CBC: Date: 8/4/2023 WBC 10.6 Hgb 10.5 hematocrit 33.3 platelets 388  Liver function: Date: 8/4/2023 ALT 5 AST 11 alkaline phos.  57 bilirubin 0.3 albumin 3.7 protein 7.9    8/4/2023 hemoglobin A1c 5.3%    8/4/2023 vitamin D 25-hydroxy level 49.22 WNL    BMP: Date: 11/7/2023 Na 140 K 4.7 Cr 1.1 BUN 18 glucose 96 Ca 9.4 GFR 82  CBC: Date: 11/7/2023 WBC 10.1 Hgb 11  "hematocrit 35.4 platelets 278    11/7/2023: Hemoglobin A1c 6.1%    BMP: Date: 4/10/2024 Na 136 K 4.5 Cr 1.1 BUN 13 glucose 171 Ca 9 GFR 82  CBC: Date: 4/10/2024 WBC 17.7 Hgb 10.1 hematocrit 31.7 platelets 510    BMP: Date: 4/12/2024 Na 138 K 4.5 Cr 1.1 BUN 12 glucose 171 Ca 9 GFR 82  CBC: Date: 4/12/2024 WBC 11.5 Hgb 10.2 hematocrit 31.3 platelets 512          Lab Results   Component Value Date    WBC 17.2 (H) 11/11/2020    HGB 12.8 (L) 11/11/2020    HCT 41.9 11/11/2020     11/11/2020    ALT 12 11/11/2020    AST 14 11/11/2020     11/11/2020    K 3.7 11/11/2020     11/11/2020    CREATININE 0.96 11/11/2020    BUN 14 11/11/2020    CO2 27 11/11/2020    HGBA1C 5.6 11/11/2020             /75   Pulse 73   Temp 36.7 °C (98 °F)   Resp 18   Ht 1.753 m (5' 9\")   Wt 67.9 kg (149 lb 9.6 oz)   SpO2 95%   BMI 22.09 kg/m²      Physical Exam  Vitals and nursing note reviewed.   Constitutional:       General: He is awake.      Appearance: He is underweight.      Comments: Chronically ill   HENT:      Head: Normocephalic.      Right Ear: External ear normal.      Left Ear: External ear normal.      Nose: Nose normal.      Mouth/Throat:      Mouth: Mucous membranes are moist.      Pharynx: Oropharynx is clear.   Eyes:      Extraocular Movements: Extraocular movements intact.      Conjunctiva/sclera: Conjunctivae normal.      Pupils: Pupils are equal, round, and reactive to light.   Cardiovascular:      Rate and Rhythm: Normal rate and regular rhythm.      Pulses: Normal pulses.      Heart sounds: Normal heart sounds.   Pulmonary:      Effort: Pulmonary effort is normal.      Breath sounds: Normal breath sounds.   Abdominal:      General: Bowel sounds are normal.      Palpations: Abdomen is soft.   Musculoskeletal:         General: Normal range of motion.      Cervical back: Normal range of motion and neck supple.   Skin:     General: Skin is warm and dry.   Neurological:      Mental Status: He is alert " and oriented to person, place, and time. Mental status is at baseline.      Motor: Weakness present.   Psychiatric:         Mood and Affect: Mood normal. Affect is flat.         Behavior: Behavior is slowed. Behavior is not agitated or aggressive. Behavior is cooperative.         Judgment: Judgment is not inappropriate.          Assessment/Plan    CMP and CBC with diff., Ha1c in August.      Type 2 Diabetes;  noncompliance with diagnostic tests:  Continue Metformin 500 mg PO BID.              Order Ha1c.              Encourage compliance with diagnostic tests.         Major Depressive Disorder;  Paranoid Schizophrenia; ; unspecified psychosis :   Continue  Fluphenazine.  Continue psychosis or agitation reported.       Problem List Items Addressed This Visit          Mental Health    Chronic paranoid schizophrenia (Multi)    Major depressive disorder     Other Visit Diagnoses       Type 2 diabetes mellitus without complication, without long-term current use of insulin (Multi)    -  Primary    Noncompliance with diagnostic testing        Psychosis, unspecified psychosis type (Multi)                    CHON Valentine       Electronically Signed By: CHON Valentine   7/20/24  6:09 PM

## 2024-07-20 VITALS
OXYGEN SATURATION: 95 % | HEART RATE: 73 BPM | BODY MASS INDEX: 22.16 KG/M2 | HEIGHT: 69 IN | WEIGHT: 149.6 LBS | RESPIRATION RATE: 18 BRPM | TEMPERATURE: 98 F | DIASTOLIC BLOOD PRESSURE: 75 MMHG | SYSTOLIC BLOOD PRESSURE: 119 MMHG

## 2024-07-20 NOTE — PROGRESS NOTES
Name: Carter Jefferson    YOB: 1960    Code Status: Full Code    Chief Complaint:   Follow up on DM 2; etc....    HPI       64 year old male at [facility] in [facility] Term [facility].  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     Diagnoses as follows:    Type 2 Diabetes;  noncompliance with diagnostic tests:  On Metformin 500 mg PO BID.               Ha1c trends:  11/18/21 HA1c 5.6 %   10/12/21 HA1c 5.5 %   5/3/2023: HA1c 5%  8/4/2023 Ha1c 5.3 %  11/7/2023: Hemoglobin A1c 6.1%  Monitoring Ha1c q 3 months.  Recent Ha1c was not completed because patient refused.  No hypoglycemia reported.  No complaints of chest pain, headaches or dizziness.  No nausea or vomiting.   No abdominal pain.  Patient seen today.  Calm, cooperative.  Mentation at baseline.           Major Depressive Disorder; Paranoid Schizophrenia;  unspecified psychosis :   On Fluphenazine.  No psychosis or agitation reported.   Nursing reports he is  compliant with taking his medications.  His mentation is at baseline.  No recent agitation reported by staff.  Patient generally sleeps a lot and likes to stay in bed.                     Reviewed  EMR  Reviewed medical, social, surgical and family history.  Reviewed all current medications and performed medication reconciliation.  Performed prescription drug management.  Reviewed vital signs AND lab  results  Reviewed [facility] Documentation  Discussed patient with nursing.     ROS: 10 point ROS performed; Negative unless noted in HPI.      Surgical History  Problems    · No history of surgery    Family History  Mother    · No pertinent family history    Social History   · Current every day smoker   · Light cigarette smoker    · Lives in long-term care facility    · No illicit drug use   · Quit consuming alcohol in remote past    Allergies  NoKnown    · No Known Allergies      Outside Labs:  CBC: Date: 10/19/21, WBC: 11.6, Hgb: 12, Hct: 38.5, PLT: 310   CBC: Date: 10/12/21, WBC: 13.2, Hgb: 11.9, Hct: 38.2, PLT: 305   BMP: Date: 10/19/21, Na: 140, K: 4.2, Cl: 104, CO2: 26, BUN: 17, Cr: 1.0, Glu: 51, Ca: 9.7   BMP: Date: 10/12/21, Na: 140, K: 4.3, Cl: 103, CO2: 27, BUN: 17, Cr: 1.1, Glu: 80, Ca: 9.7   Hepatic Function Tests: Date: 10/12/21, AST: 12, ALT: 12, Alk Phos: 44, T Bili: 0.3, Albumin: 4.0   Hepatic Function Tests: Date: 10/19/21, AST: 12, ALT: 11, Alk Phos: 40, T Bili: 0.3, Albumin: 3.9   11/18/20 HA1 C 5.6 %    10/12/21 HA1c 5.5 %.     BMP: Date: 5/3/2023 Na 141 K 4.2 Cr 0.9 BUN 17 glucose 49 Ca 9.6   CBC: Date: 5/3/2023 WBC 13.3 Hgb 11.3 hematocrit 36.5 platelets 323  Liver function: Date: 5/3/2023 ALT 7 AST 11 alkaline phos.  47 bilirubin 0.4 albumin 3.8 protein 8.2  Date: 5/3/2023: Lipid panel: Cholesterol 137; triglycerides 65; HDL 31; LDL 93.  Date 5/3/2023: HA1c 5%    BMP: Date: 8/4/2023 Na 140 K 4.1 Cr 1.1 BUN 18 glucose 44 Ca 9.2 GFR 82  CBC: Date: 8/4/2023 WBC 10.6 Hgb 10.5 hematocrit 33.3 platelets 388  Liver function: Date: 8/4/2023 ALT 5 AST 11 alkaline phos.  57 bilirubin 0.3 albumin 3.7 protein 7.9    8/4/2023 hemoglobin A1c 5.3%    8/4/2023 vitamin D 25-hydroxy level 49.22 WNL    BMP: Date: 11/7/2023 Na 140 K 4.7 Cr 1.1 BUN 18 glucose 96 Ca 9.4 GFR 82  CBC: Date: 11/7/2023 WBC 10.1 Hgb 11 hematocrit 35.4 platelets 278    11/7/2023: Hemoglobin A1c 6.1%    BMP: Date: 4/10/2024  "Na 136 K 4.5 Cr 1.1 BUN 13 glucose 171 Ca 9 GFR 82  CBC: Date: 4/10/2024 WBC 17.7 Hgb 10.1 hematocrit 31.7 platelets 510    BMP: Date: 4/12/2024 Na 138 K 4.5 Cr 1.1 BUN 12 glucose 171 Ca 9 GFR 82  CBC: Date: 4/12/2024 WBC 11.5 Hgb 10.2 hematocrit 31.3 platelets 512          Lab Results   Component Value Date    WBC 17.2 (H) 11/11/2020    HGB 12.8 (L) 11/11/2020    HCT 41.9 11/11/2020     11/11/2020    ALT 12 11/11/2020    AST 14 11/11/2020     11/11/2020    K 3.7 11/11/2020     11/11/2020    CREATININE 0.96 11/11/2020    BUN 14 11/11/2020    CO2 27 11/11/2020    HGBA1C 5.6 11/11/2020             /75   Pulse 73   Temp 36.7 °C (98 °F)   Resp 18   Ht 1.753 m (5' 9\")   Wt 67.9 kg (149 lb 9.6 oz)   SpO2 95%   BMI 22.09 kg/m²      Physical Exam  Vitals and nursing note reviewed.   Constitutional:       General: He is awake.      Appearance: He is underweight.      Comments: Chronically ill   HENT:      Head: Normocephalic.      Right Ear: External ear normal.      Left Ear: External ear normal.      Nose: Nose normal.      Mouth/Throat:      Mouth: Mucous membranes are moist.      Pharynx: Oropharynx is clear.   Eyes:      Extraocular Movements: Extraocular movements intact.      Conjunctiva/sclera: Conjunctivae normal.      Pupils: Pupils are equal, round, and reactive to light.   Cardiovascular:      Rate and Rhythm: Normal rate and regular rhythm.      Pulses: Normal pulses.      Heart sounds: Normal heart sounds.   Pulmonary:      Effort: Pulmonary effort is normal.      Breath sounds: Normal breath sounds.   Abdominal:      General: Bowel sounds are normal.      Palpations: Abdomen is soft.   Musculoskeletal:         General: Normal range of motion.      Cervical back: Normal range of motion and neck supple.   Skin:     General: Skin is warm and dry.   Neurological:      Mental Status: He is alert and oriented to person, place, and time. Mental status is at baseline.      Motor: " Weakness present.   Psychiatric:         Mood and Affect: Mood normal. Affect is flat.         Behavior: Behavior is slowed. Behavior is not agitated or aggressive. Behavior is cooperative.         Judgment: Judgment is not inappropriate.          Assessment/Plan     CMP and CBC with diff., Ha1c in August.      Type 2 Diabetes;  noncompliance with diagnostic tests:  Continue Metformin 500 mg PO BID.              Order Ha1c.              Encourage compliance with diagnostic tests.         Major Depressive Disorder;  Paranoid Schizophrenia; ; unspecified psychosis :   Continue  Fluphenazine.  Continue psychosis or agitation reported.       Problem List Items Addressed This Visit          Mental Health    Chronic paranoid schizophrenia (Multi)    Major depressive disorder     Other Visit Diagnoses       Type 2 diabetes mellitus without complication, without long-term current use of insulin (Multi)    -  Primary    Noncompliance with diagnostic testing        Psychosis, unspecified psychosis type (Multi)                    Bethany Jones, GARY-CNP

## 2024-07-26 ENCOUNTER — NURSING HOME VISIT (OUTPATIENT)
Dept: POST ACUTE CARE | Facility: EXTERNAL LOCATION | Age: 64
End: 2024-07-26
Payer: MEDICAID

## 2024-07-26 DIAGNOSIS — M19.90 OSTEOARTHRITIS, UNSPECIFIED OSTEOARTHRITIS TYPE, UNSPECIFIED SITE: ICD-10-CM

## 2024-07-26 DIAGNOSIS — F33.9 RECURRENT MAJOR DEPRESSIVE DISORDER, REMISSION STATUS UNSPECIFIED (CMS-HCC): Primary | ICD-10-CM

## 2024-07-26 DIAGNOSIS — F20.0 CHRONIC PARANOID SCHIZOPHRENIA (MULTI): ICD-10-CM

## 2024-07-26 PROCEDURE — 99307 SBSQ NF CARE SF MDM 10: CPT | Performed by: INTERNAL MEDICINE

## 2024-07-26 NOTE — LETTER
Patient: Carter Jefferson  : 1960    Encounter Date: 2024     Subjective- monthly follow up.  H/O schizophrenia, hypertension, hyperlipidemia, diabetes seen today for a routine visit .He is walking in the hallway .Denies any complaints no concerns per staff.  ROS:  General-no fever  Chest-no pain  Respiratory-nocough  Abdomen-no pain or diarrhea  General: no acute distress  Pain:  Vital signs: /80  T 98.2  79 Weight 151 lbs  Gen- NAD,alert  Lungs: clear to auscultation B/L  Cardio: S1-S2 normal  ABD: Soft, nontender  Musc/Skel:No deformities  Extremities: No pedal edema  Neuro: No neurological deficits  Psych: Schizophrenia  Assessment/plan-  Hypertension- stable  c/w ASA  Hyperlipidemia-  Diabetes type 2-Hba1c 6.1  c/w Metformin  Schizophrenia-continue with fluphenazine  Follow-up with psych  Weakness- c/w supportive care      Electronically Signed By: Dianne Jewell MD   24  6:10 PM

## 2024-08-08 NOTE — PROGRESS NOTES
Subjective- monthly follow up.  H/O schizophrenia, hypertension, hyperlipidemia, diabetes seen today for a routine visit .He is walking in the hallway .Denies any complaints no concerns per staff.  ROS:  General-no fever  Chest-no pain  Respiratory-nocough  Abdomen-no pain or diarrhea  General: no acute distress  Pain:  Vital signs: /80  T 98.2  79 Weight 151 lbs  Gen- NAD,alert  Lungs: clear to auscultation B/L  Cardio: S1-S2 normal  ABD: Soft, nontender  Musc/Skel:No deformities  Extremities: No pedal edema  Neuro: No neurological deficits  Psych: Schizophrenia  Assessment/plan-  Hypertension- stable  c/w ASA  Hyperlipidemia-  Diabetes type 2-Hba1c 6.1  c/w Metformin  Schizophrenia-continue with fluphenazine  Follow-up with psych  Weakness- c/w supportive care

## 2024-08-13 ENCOUNTER — NURSING HOME VISIT (OUTPATIENT)
Dept: POST ACUTE CARE | Facility: EXTERNAL LOCATION | Age: 64
End: 2024-08-13
Payer: MEDICAID

## 2024-08-13 DIAGNOSIS — Z91.199 NONCOMPLIANCE WITH DIAGNOSTIC TESTING: ICD-10-CM

## 2024-08-13 DIAGNOSIS — F33.9 RECURRENT MAJOR DEPRESSIVE DISORDER, REMISSION STATUS UNSPECIFIED (CMS-HCC): ICD-10-CM

## 2024-08-13 DIAGNOSIS — F20.0 CHRONIC PARANOID SCHIZOPHRENIA (MULTI): ICD-10-CM

## 2024-08-13 DIAGNOSIS — F29 PSYCHOSIS, UNSPECIFIED PSYCHOSIS TYPE (MULTI): ICD-10-CM

## 2024-08-13 DIAGNOSIS — E11.9 TYPE 2 DIABETES MELLITUS WITHOUT COMPLICATION, WITHOUT LONG-TERM CURRENT USE OF INSULIN (MULTI): Primary | ICD-10-CM

## 2024-08-13 PROCEDURE — 99308 SBSQ NF CARE LOW MDM 20: CPT | Performed by: NURSE PRACTITIONER

## 2024-08-13 NOTE — LETTER
Patient: Carter Jefferson  : 1960    Encounter Date: 2024    Name: Carter Jefferson    YOB: 1960    Code Status: Full Code    Chief Complaint:   Follow up on DM 2; etc....    HPI       64 year old male at [facility] in Long Term [facility].  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     Diagnoses as follows:    Type 2 Diabetes;  noncompliance with diagnostic tests:  On Metformin 500 mg PO BID.               Ha1c trends:  21 HA1c 5.6 %   10/12/21 HA1c 5.5 %   5/3/2023: HA1c 5%  2023 Ha1c 5.3 %  2023: Hemoglobin A1c 6.1%  Monitoring Ha1c q 3 months.  Recent Ha1c was not completed because patient refused.  No hypoglycemia reported.  No complaints of chest pain, headaches or dizziness.  No nausea or vomiting.   No abdominal pain.  Patient seen today.  Calm, cooperative.  Mentation at baseline.        Paranoid Schizophrenia; Major Depressive Disorder;   unspecified psychosis :   On Fluphenazine.  No psychosis or agitation reported.   Nursing reports he is  compliant with taking his medications.  His mentation is at baseline.  No recent agitation reported by staff.  Patient generally sleeps a lot and likes to stay in bed.                     Reviewed  EMR  Reviewed medical, social, surgical and family history.  Reviewed all current medications and performed medication  reconciliation.  Performed prescription drug management.  Reviewed vital signs AND lab results  Reviewed [facility] Documentation  Discussed patient with nursing.     ROS: 10 point ROS performed; Negative unless noted in HPI.      Surgical History  Problems    · No history of surgery    Family History  Mother    · No pertinent family history    Social History   · Current every day smoker   · Light cigarette smoker    · Lives in long-term care facility    · No illicit drug use   · Quit consuming alcohol in remote past    Allergies  NoKnown    · No Known Allergies      Outside Labs:  CBC: Date: 10/19/21, WBC: 11.6, Hgb: 12, Hct: 38.5, PLT: 310   CBC: Date: 10/12/21, WBC: 13.2, Hgb: 11.9, Hct: 38.2, PLT: 305   BMP: Date: 10/19/21, Na: 140, K: 4.2, Cl: 104, CO2: 26, BUN: 17, Cr: 1.0, Glu: 51, Ca: 9.7   BMP: Date: 10/12/21, Na: 140, K: 4.3, Cl: 103, CO2: 27, BUN: 17, Cr: 1.1, Glu: 80, Ca: 9.7   Hepatic Function Tests: Date: 10/12/21, AST: 12, ALT: 12, Alk Phos: 44, T Bili: 0.3, Albumin: 4.0   Hepatic Function Tests: Date: 10/19/21, AST: 12, ALT: 11, Alk Phos: 40, T Bili: 0.3, Albumin: 3.9   11/18/20 HA1 C 5.6 %    10/12/21 HA1c 5.5 %.     BMP: Date: 5/3/2023 Na 141 K 4.2 Cr 0.9 BUN 17 glucose 49 Ca 9.6   CBC: Date: 5/3/2023 WBC 13.3 Hgb 11.3 hematocrit 36.5 platelets 323  Liver function: Date: 5/3/2023 ALT 7 AST 11 alkaline phos.  47 bilirubin 0.4 albumin 3.8 protein 8.2  Date: 5/3/2023: Lipid panel: Cholesterol 137; triglycerides 65; HDL 31; LDL 93.  Date 5/3/2023: HA1c 5%    BMP: Date: 8/4/2023 Na 140 K 4.1 Cr 1.1 BUN 18 glucose 44 Ca 9.2 GFR 82  CBC: Date: 8/4/2023 WBC 10.6 Hgb 10.5 hematocrit 33.3 platelets 388  Liver function: Date: 8/4/2023 ALT 5 AST 11 alkaline phos.  57 bilirubin 0.3 albumin 3.7 protein 7.9    8/4/2023 hemoglobin A1c 5.3%    8/4/2023 vitamin D 25-hydroxy level 49.22 WNL    BMP: Date: 11/7/2023 Na 140 K 4.7 Cr 1.1 BUN 18 glucose 96 Ca 9.4 GFR 82  CBC: Date: 11/7/2023 WBC 10.1 Hgb 11  "hematocrit 35.4 platelets 278    11/7/2023: Hemoglobin A1c 6.1%    BMP: Date: 4/10/2024 Na 136 K 4.5 Cr 1.1 BUN 13 glucose 171 Ca 9 GFR 82  CBC: Date: 4/10/2024 WBC 17.7 Hgb 10.1 hematocrit 31.7 platelets 510    BMP: Date: 4/12/2024 Na 138 K 4.5 Cr 1.1 BUN 12 glucose 171 Ca 9 GFR 82  CBC: Date: 4/12/2024 WBC 11.5 Hgb 10.2 hematocrit 31.3 platelets 512          Lab Results   Component Value Date    WBC 17.2 (H) 11/11/2020    HGB 12.8 (L) 11/11/2020    HCT 41.9 11/11/2020     11/11/2020    ALT 12 11/11/2020    AST 14 11/11/2020     11/11/2020    K 3.7 11/11/2020     11/11/2020    CREATININE 0.96 11/11/2020    BUN 14 11/11/2020    CO2 27 11/11/2020    HGBA1C 5.6 11/11/2020             /80   Pulse 65   Resp 18   Ht 1.763 m (5' 9.4\")   Wt 68.7 kg (151 lb 6.4 oz)   SpO2 95%   BMI 22.10 kg/m²      Physical Exam  Vitals and nursing note reviewed.   Constitutional:       General: He is awake.      Appearance: He is underweight.      Comments: Chronically ill   HENT:      Head: Normocephalic.      Right Ear: External ear normal.      Left Ear: External ear normal.      Nose: Nose normal.      Mouth/Throat:      Mouth: Mucous membranes are moist.      Pharynx: Oropharynx is clear.   Eyes:      Extraocular Movements: Extraocular movements intact.      Conjunctiva/sclera: Conjunctivae normal.      Pupils: Pupils are equal, round, and reactive to light.   Cardiovascular:      Rate and Rhythm: Normal rate and regular rhythm.      Pulses: Normal pulses.      Heart sounds: Normal heart sounds.   Pulmonary:      Effort: Pulmonary effort is normal.      Breath sounds: Normal breath sounds.   Abdominal:      General: Bowel sounds are normal.      Palpations: Abdomen is soft.   Musculoskeletal:         General: Normal range of motion.      Cervical back: Normal range of motion and neck supple.   Skin:     General: Skin is warm and dry.   Neurological:      Mental Status: He is alert and oriented to person, " place, and time. Mental status is at baseline.      Motor: Weakness present.   Psychiatric:         Mood and Affect: Mood normal. Affect is flat.         Behavior: Behavior is slowed. Behavior is not agitated or aggressive. Behavior is cooperative.         Judgment: Judgment is not inappropriate.          Assessment/Plan    CMP and CBC with diff., Ha1c in August.      Type 2 Diabetes;  noncompliance with diagnostic tests:  Continue Metformin 500 mg PO BID.              Order Ha1c.              Encourage compliance with diagnostic tests.        Paranoid Schizophrenia; Major Depressive Disorder; unspecified psychosis :   Continue  Fluphenazine.  Continue psychosis or agitation reported.       Problem List Items Addressed This Visit          Mental Health    Chronic paranoid schizophrenia (Multi)    Major depressive disorder     Other Visit Diagnoses       Type 2 diabetes mellitus without complication, without long-term current use of insulin (Multi)    -  Primary    Noncompliance with diagnostic testing        Psychosis, unspecified psychosis type (Multi)                      CHON Valentine       Electronically Signed By: CHON Valentine   8/19/24 12:48 AM

## 2024-08-19 VITALS
RESPIRATION RATE: 18 BRPM | WEIGHT: 151.4 LBS | OXYGEN SATURATION: 95 % | SYSTOLIC BLOOD PRESSURE: 111 MMHG | HEART RATE: 65 BPM | DIASTOLIC BLOOD PRESSURE: 80 MMHG | BODY MASS INDEX: 22.42 KG/M2 | HEIGHT: 69 IN

## 2024-08-19 NOTE — PROGRESS NOTES
Name: Carter Jefferson    YOB: 1960    Code Status: Full Code    Chief Complaint:   Follow up on DM 2; etc....    HPI       64 year old male at [facility] in Long Term Care.  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     Diagnoses as follows:    Type 2 Diabetes;  noncompliance with diagnostic tests:  On Metformin 500 mg PO BID.               Ha1c trends:  11/18/21 HA1c 5.6 %   10/12/21 HA1c 5.5 %   5/3/2023: HA1c 5%  8/4/2023 Ha1c 5.3 %  11/7/2023: Hemoglobin A1c 6.1%  Monitoring Ha1c q 3 months.  Recent Ha1c was not completed because patient refused.  No hypoglycemia reported.  No complaints of chest pain, headaches or dizziness.  No nausea or vomiting.   No abdominal pain.  Patient seen today.  Calm, cooperative.  Mentation at baseline.        Paranoid Schizophrenia; Major Depressive Disorder;   unspecified psychosis :   On Fluphenazine.  No psychosis or agitation reported.   Nursing reports he is  compliant with taking his medications.  His mentation is at baseline.  No recent agitation reported by staff.  Patient generally sleeps a lot and likes to stay in bed.                     Reviewed  EMR  Reviewed medical, social, surgical and family history.  Reviewed all current medications and performed medication reconciliation.  Performed prescription drug management.  Reviewed vital signs AND lab  results  Reviewed [facility] Care Documentation  Discussed patient with nursing.     ROS: 10 point ROS performed; Negative unless noted in HPI.      Surgical History  Problems    · No history of surgery    Family History  Mother    · No pertinent family history    Social History   · Current every day smoker   · Light cigarette smoker    · Lives in long-term care facility    · No illicit drug use   · Quit consuming alcohol in remote past    Allergies  NoKnown    · No Known Allergies      Outside Labs:  CBC: Date: 10/19/21, WBC: 11.6, Hgb: 12, Hct: 38.5, PLT: 310   CBC: Date: 10/12/21, WBC: 13.2, Hgb: 11.9, Hct: 38.2, PLT: 305   BMP: Date: 10/19/21, Na: 140, K: 4.2, Cl: 104, CO2: 26, BUN: 17, Cr: 1.0, Glu: 51, Ca: 9.7   BMP: Date: 10/12/21, Na: 140, K: 4.3, Cl: 103, CO2: 27, BUN: 17, Cr: 1.1, Glu: 80, Ca: 9.7   Hepatic Function Tests: Date: 10/12/21, AST: 12, ALT: 12, Alk Phos: 44, T Bili: 0.3, Albumin: 4.0   Hepatic Function Tests: Date: 10/19/21, AST: 12, ALT: 11, Alk Phos: 40, T Bili: 0.3, Albumin: 3.9   11/18/20 HA1 C 5.6 %    10/12/21 HA1c 5.5 %.     BMP: Date: 5/3/2023 Na 141 K 4.2 Cr 0.9 BUN 17 glucose 49 Ca 9.6   CBC: Date: 5/3/2023 WBC 13.3 Hgb 11.3 hematocrit 36.5 platelets 323  Liver function: Date: 5/3/2023 ALT 7 AST 11 alkaline phos.  47 bilirubin 0.4 albumin 3.8 protein 8.2  Date: 5/3/2023: Lipid panel: Cholesterol 137; triglycerides 65; HDL 31; LDL 93.  Date 5/3/2023: HA1c 5%    BMP: Date: 8/4/2023 Na 140 K 4.1 Cr 1.1 BUN 18 glucose 44 Ca 9.2 GFR 82  CBC: Date: 8/4/2023 WBC 10.6 Hgb 10.5 hematocrit 33.3 platelets 388  Liver function: Date: 8/4/2023 ALT 5 AST 11 alkaline phos.  57 bilirubin 0.3 albumin 3.7 protein 7.9    8/4/2023 hemoglobin A1c 5.3%    8/4/2023 vitamin D 25-hydroxy level 49.22 WNL    BMP: Date: 11/7/2023 Na 140 K 4.7 Cr 1.1 BUN 18 glucose 96 Ca 9.4 GFR 82  CBC: Date: 11/7/2023 WBC 10.1 Hgb 11 hematocrit 35.4 platelets 278    11/7/2023: Hemoglobin A1c 6.1%    BMP: Date: 4/10/2024  "Na 136 K 4.5 Cr 1.1 BUN 13 glucose 171 Ca 9 GFR 82  CBC: Date: 4/10/2024 WBC 17.7 Hgb 10.1 hematocrit 31.7 platelets 510    BMP: Date: 4/12/2024 Na 138 K 4.5 Cr 1.1 BUN 12 glucose 171 Ca 9 GFR 82  CBC: Date: 4/12/2024 WBC 11.5 Hgb 10.2 hematocrit 31.3 platelets 512          Lab Results   Component Value Date    WBC 17.2 (H) 11/11/2020    HGB 12.8 (L) 11/11/2020    HCT 41.9 11/11/2020     11/11/2020    ALT 12 11/11/2020    AST 14 11/11/2020     11/11/2020    K 3.7 11/11/2020     11/11/2020    CREATININE 0.96 11/11/2020    BUN 14 11/11/2020    CO2 27 11/11/2020    HGBA1C 5.6 11/11/2020             /80   Pulse 65   Resp 18   Ht 1.763 m (5' 9.4\")   Wt 68.7 kg (151 lb 6.4 oz)   SpO2 95%   BMI 22.10 kg/m²      Physical Exam  Vitals and nursing note reviewed.   Constitutional:       General: He is awake.      Appearance: He is underweight.      Comments: Chronically ill   HENT:      Head: Normocephalic.      Right Ear: External ear normal.      Left Ear: External ear normal.      Nose: Nose normal.      Mouth/Throat:      Mouth: Mucous membranes are moist.      Pharynx: Oropharynx is clear.   Eyes:      Extraocular Movements: Extraocular movements intact.      Conjunctiva/sclera: Conjunctivae normal.      Pupils: Pupils are equal, round, and reactive to light.   Cardiovascular:      Rate and Rhythm: Normal rate and regular rhythm.      Pulses: Normal pulses.      Heart sounds: Normal heart sounds.   Pulmonary:      Effort: Pulmonary effort is normal.      Breath sounds: Normal breath sounds.   Abdominal:      General: Bowel sounds are normal.      Palpations: Abdomen is soft.   Musculoskeletal:         General: Normal range of motion.      Cervical back: Normal range of motion and neck supple.   Skin:     General: Skin is warm and dry.   Neurological:      Mental Status: He is alert and oriented to person, place, and time. Mental status is at baseline.      Motor: Weakness present. "   Psychiatric:         Mood and Affect: Mood normal. Affect is flat.         Behavior: Behavior is slowed. Behavior is not agitated or aggressive. Behavior is cooperative.         Judgment: Judgment is not inappropriate.          Assessment/Plan     CMP and CBC with diff., Ha1c in August.      Type 2 Diabetes;  noncompliance with diagnostic tests:  Continue Metformin 500 mg PO BID.              Order Ha1c.              Encourage compliance with diagnostic tests.        Paranoid Schizophrenia; Major Depressive Disorder; unspecified psychosis :   Continue  Fluphenazine.  Continue psychosis or agitation reported.       Problem List Items Addressed This Visit          Mental Health    Chronic paranoid schizophrenia (Multi)    Major depressive disorder     Other Visit Diagnoses       Type 2 diabetes mellitus without complication, without long-term current use of insulin (Multi)    -  Primary    Noncompliance with diagnostic testing        Psychosis, unspecified psychosis type (Multi)                      Bethany Jones, APRN-CNP

## 2024-09-12 ENCOUNTER — NURSING HOME VISIT (OUTPATIENT)
Dept: POST ACUTE CARE | Facility: EXTERNAL LOCATION | Age: 64
End: 2024-09-12
Payer: MEDICAID

## 2024-09-12 DIAGNOSIS — Z91.199 NONCOMPLIANCE WITH DIAGNOSTIC TESTING: ICD-10-CM

## 2024-09-12 DIAGNOSIS — F20.0 CHRONIC PARANOID SCHIZOPHRENIA (MULTI): Primary | ICD-10-CM

## 2024-09-12 DIAGNOSIS — F29 PSYCHOSIS, UNSPECIFIED PSYCHOSIS TYPE (MULTI): ICD-10-CM

## 2024-09-12 DIAGNOSIS — E11.00 TYPE 2 DIABETES MELLITUS WITH HYPEROSMOLARITY WITHOUT COMA, WITHOUT LONG-TERM CURRENT USE OF INSULIN (MULTI): ICD-10-CM

## 2024-09-12 DIAGNOSIS — F33.9 RECURRENT MAJOR DEPRESSIVE DISORDER, REMISSION STATUS UNSPECIFIED (CMS-HCC): ICD-10-CM

## 2024-09-12 PROCEDURE — 99308 SBSQ NF CARE LOW MDM 20: CPT | Performed by: NURSE PRACTITIONER

## 2024-09-12 NOTE — LETTER
Patient: Carter Jefferson  : 1960    Encounter Date: 2024    Name: Carter Jefferson    YOB: 1960    Code Status: Full Code    Chief Complaint:   Follow up on schizophrenia; etc....    HPI       64 year old male at [facility] in Long Term Care.  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     Diagnoses as follows:    Paranoid Schizophrenia; Major Depressive Disorder;   unspecified psychosis :   On Fluphenazine.  No psychosis or agitation reported.   Nursing reports he is  compliant with taking his medications.  His mentation is at baseline.  No recent agitation reported by staff.  Patient generally sleeps a lot and likes to stay in bed.  Patient seen today.  Calm, cooperative.  Mentation at baseline.               Type 2 Diabetes;  noncompliance with diagnostic tests:  On Metformin 500 mg PO BID.               Ha1c trends:  21 HA1c 5.6 %   10/12/21 HA1c 5.5 %   5/3/2023: HA1c 5%  2023 Ha1c 5.3 %  2023: Hemoglobin A1c 6.1%  Monitoring Ha1c q 3 months.  Recent Ha1c was not completed because patient refused.  Discussed lab work with patient.  He states he will permit bloodwork to be performed next week.  No hypoglycemia reported.  No complaints of chest pain, headaches or dizziness.  No nausea or vomiting.   No abdominal pain.                         Reviewed  EMR  Reviewed medical,  social, surgical and family history.  Reviewed all current medications and performed medication reconciliation.  Performed prescription drug management.  Reviewed vital signs AND lab results  Reviewed [facility] Documentation  Discussed patient with nursing.     ROS: 10 point ROS performed; Negative unless noted in HPI.      Surgical History  Problems    · No history of surgery    Family History  Mother    · No pertinent family history    Social History   · Current every day smoker   · Light cigarette smoker    · Lives in long-term care facility    · No illicit drug use   · Quit consuming alcohol in remote past    Allergies  NoKnown    · No Known Allergies      Outside Labs:  CBC: Date: 10/19/21, WBC: 11.6, Hgb: 12, Hct: 38.5, PLT: 310   CBC: Date: 10/12/21, WBC: 13.2, Hgb: 11.9, Hct: 38.2, PLT: 305   BMP: Date: 10/19/21, Na: 140, K: 4.2, Cl: 104, CO2: 26, BUN: 17, Cr: 1.0, Glu: 51, Ca: 9.7   BMP: Date: 10/12/21, Na: 140, K: 4.3, Cl: 103, CO2: 27, BUN: 17, Cr: 1.1, Glu: 80, Ca: 9.7   Hepatic Function Tests: Date: 10/12/21, AST: 12, ALT: 12, Alk Phos: 44, T Bili: 0.3, Albumin: 4.0   Hepatic Function Tests: Date: 10/19/21, AST: 12, ALT: 11, Alk Phos: 40, T Bili: 0.3, Albumin: 3.9   11/18/20 HA1 C 5.6 %    10/12/21 HA1c 5.5 %.     BMP: Date: 5/3/2023 Na 141 K 4.2 Cr 0.9 BUN 17 glucose 49 Ca 9.6   CBC: Date: 5/3/2023 WBC 13.3 Hgb 11.3 hematocrit 36.5 platelets 323  Liver function: Date: 5/3/2023 ALT 7 AST 11 alkaline phos.  47 bilirubin 0.4 albumin 3.8 protein 8.2  Date: 5/3/2023: Lipid panel: Cholesterol 137; triglycerides 65; HDL 31; LDL 93.  Date 5/3/2023: HA1c 5%    BMP: Date: 8/4/2023 Na 140 K 4.1 Cr 1.1 BUN 18 glucose 44 Ca 9.2 GFR 82  CBC: Date: 8/4/2023 WBC 10.6 Hgb 10.5 hematocrit 33.3 platelets 388  Liver function: Date: 8/4/2023 ALT 5 AST 11 alkaline phos.  57 bilirubin 0.3 albumin 3.7 protein 7.9    8/4/2023 hemoglobin A1c 5.3%    8/4/2023 vitamin D 25-hydroxy level 49.22 WNL    BMP: Date:  "11/7/2023 Na 140 K 4.7 Cr 1.1 BUN 18 glucose 96 Ca 9.4 GFR 82  CBC: Date: 11/7/2023 WBC 10.1 Hgb 11 hematocrit 35.4 platelets 278    11/7/2023: Hemoglobin A1c 6.1%    BMP: Date: 4/10/2024 Na 136 K 4.5 Cr 1.1 BUN 13 glucose 171 Ca 9 GFR 82  CBC: Date: 4/10/2024 WBC 17.7 Hgb 10.1 hematocrit 31.7 platelets 510    BMP: Date: 4/12/2024 Na 138 K 4.5 Cr 1.1 BUN 12 glucose 171 Ca 9 GFR 82  CBC: Date: 4/12/2024 WBC 11.5 Hgb 10.2 hematocrit 31.3 platelets 512          Lab Results   Component Value Date    WBC 17.2 (H) 11/11/2020    HGB 12.8 (L) 11/11/2020    HCT 41.9 11/11/2020     11/11/2020    ALT 12 11/11/2020    AST 14 11/11/2020     11/11/2020    K 3.7 11/11/2020     11/11/2020    CREATININE 0.96 11/11/2020    BUN 14 11/11/2020    CO2 27 11/11/2020    HGBA1C 5.6 11/11/2020             /76   Pulse 70   Temp 36.6 °C (97.9 °F)   Resp 18   Ht 1.763 m (5' 9.4\")   Wt 68.3 kg (150 lb 9.6 oz)   SpO2 96%   BMI 21.98 kg/m²      Physical Exam  Vitals and nursing note reviewed.   Constitutional:       General: He is awake.      Appearance: He is underweight.      Comments: Chronically ill   HENT:      Head: Normocephalic.      Right Ear: External ear normal.      Left Ear: External ear normal.      Nose: Nose normal.      Mouth/Throat:      Mouth: Mucous membranes are moist.      Pharynx: Oropharynx is clear.   Eyes:      Extraocular Movements: Extraocular movements intact.      Conjunctiva/sclera: Conjunctivae normal.      Pupils: Pupils are equal, round, and reactive to light.   Cardiovascular:      Rate and Rhythm: Normal rate and regular rhythm.      Pulses: Normal pulses.      Heart sounds: Normal heart sounds.   Pulmonary:      Effort: Pulmonary effort is normal.      Breath sounds: Normal breath sounds.   Abdominal:      General: Bowel sounds are normal.      Palpations: Abdomen is soft.   Musculoskeletal:         General: Normal range of motion.      Cervical back: Normal range of motion and " neck supple.   Skin:     General: Skin is warm and dry.   Neurological:      Mental Status: He is alert and oriented to person, place, and time. Mental status is at baseline.      Motor: Weakness present.   Psychiatric:         Mood and Affect: Mood normal. Affect is flat.         Behavior: Behavior is slowed. Behavior is not agitated or aggressive. Behavior is cooperative.         Judgment: Judgment is not inappropriate.          Assessment/Plan    Ordered CMP and CBC with diff., Ha1c for next Monday      Paranoid Schizophrenia; Major Depressive Disorder; unspecified psychosis :   Continue  Fluphenazine.  Continue psychosis or agitation reported.         Type 2 Diabetes;  noncompliance with diagnostic tests:  Continue Metformin 500 mg PO BID.              Order Ha1c.              Encourage compliance with diagnostic tests.            Problem List Items Addressed This Visit       Chronic paranoid schizophrenia (Multi) - Primary    Major depressive disorder    Type 2 diabetes mellitus with hyperosmolarity without coma, without long-term current use of insulin (Multi)     Other Visit Diagnoses       Psychosis, unspecified psychosis type (Multi)        Noncompliance with diagnostic testing                        CHON Valentine       Electronically Signed By: CHON Valentine   9/15/24 12:02 AM

## 2024-09-14 VITALS
TEMPERATURE: 97.9 F | OXYGEN SATURATION: 96 % | RESPIRATION RATE: 18 BRPM | DIASTOLIC BLOOD PRESSURE: 76 MMHG | SYSTOLIC BLOOD PRESSURE: 108 MMHG | WEIGHT: 150.6 LBS | BODY MASS INDEX: 22.31 KG/M2 | HEART RATE: 70 BPM | HEIGHT: 69 IN

## 2024-09-15 NOTE — PROGRESS NOTES
Name: Carter Jefferson    YOB: 1960    Code Status: Full Code    Chief Complaint:   Follow up on schizophrenia; etc....    HPI       64 year old male at [facility] in Long Term Care.  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     Diagnoses as follows:    Paranoid Schizophrenia; Major Depressive Disorder;   unspecified psychosis :   On Fluphenazine.  No psychosis or agitation reported.   Nursing reports he is  compliant with taking his medications.  His mentation is at baseline.  No recent agitation reported by staff.  Patient generally sleeps a lot and likes to stay in bed.  Patient seen today.  Calm, cooperative.  Mentation at baseline.               Type 2 Diabetes;  noncompliance with diagnostic tests:  On Metformin 500 mg PO BID.               Ha1c trends:  11/18/21 HA1c 5.6 %   10/12/21 HA1c 5.5 %   5/3/2023: HA1c 5%  8/4/2023 Ha1c 5.3 %  11/7/2023: Hemoglobin A1c 6.1%  Monitoring Ha1c q 3 months.  Recent Ha1c was not completed because patient refused.  Discussed lab work with patient.  He states he will permit bloodwork to be performed next week.  No hypoglycemia reported.  No complaints of chest pain, headaches or dizziness.  No nausea or vomiting.   No abdominal pain.                         Reviewed  EMR  Reviewed medical, social, surgical and family history.  Reviewed all current medications and  performed medication reconciliation.  Performed prescription drug management.  Reviewed vital signs AND lab results  Reviewed [facility] Documentation  Discussed patient with nursing.     ROS: 10 point ROS performed; Negative unless noted in HPI.      Surgical History  Problems    · No history of surgery    Family History  Mother    · No pertinent family history    Social History   · Current every day smoker   · Light cigarette smoker    · Lives in long-term care facility    · No illicit drug use   · Quit consuming alcohol in remote past    Allergies  NoKnown    · No Known Allergies      Outside Labs:  CBC: Date: 10/19/21, WBC: 11.6, Hgb: 12, Hct: 38.5, PLT: 310   CBC: Date: 10/12/21, WBC: 13.2, Hgb: 11.9, Hct: 38.2, PLT: 305   BMP: Date: 10/19/21, Na: 140, K: 4.2, Cl: 104, CO2: 26, BUN: 17, Cr: 1.0, Glu: 51, Ca: 9.7   BMP: Date: 10/12/21, Na: 140, K: 4.3, Cl: 103, CO2: 27, BUN: 17, Cr: 1.1, Glu: 80, Ca: 9.7   Hepatic Function Tests: Date: 10/12/21, AST: 12, ALT: 12, Alk Phos: 44, T Bili: 0.3, Albumin: 4.0   Hepatic Function Tests: Date: 10/19/21, AST: 12, ALT: 11, Alk Phos: 40, T Bili: 0.3, Albumin: 3.9   11/18/20 HA1 C 5.6 %    10/12/21 HA1c 5.5 %.     BMP: Date: 5/3/2023 Na 141 K 4.2 Cr 0.9 BUN 17 glucose 49 Ca 9.6   CBC: Date: 5/3/2023 WBC 13.3 Hgb 11.3 hematocrit 36.5 platelets 323  Liver function: Date: 5/3/2023 ALT 7 AST 11 alkaline phos.  47 bilirubin 0.4 albumin 3.8 protein 8.2  Date: 5/3/2023: Lipid panel: Cholesterol 137; triglycerides 65; HDL 31; LDL 93.  Date 5/3/2023: HA1c 5%    BMP: Date: 8/4/2023 Na 140 K 4.1 Cr 1.1 BUN 18 glucose 44 Ca 9.2 GFR 82  CBC: Date: 8/4/2023 WBC 10.6 Hgb 10.5 hematocrit 33.3 platelets 388  Liver function: Date: 8/4/2023 ALT 5 AST 11 alkaline phos.  57 bilirubin 0.3 albumin 3.7 protein 7.9    8/4/2023 hemoglobin A1c 5.3%    8/4/2023 vitamin D 25-hydroxy level 49.22 WNL    BMP: Date: 11/7/2023 Na 140 K 4.7 Cr 1.1 BUN 18 glucose 96 Ca 9.4 GFR 82  CBC: Date:  "11/7/2023 WBC 10.1 Hgb 11 hematocrit 35.4 platelets 278    11/7/2023: Hemoglobin A1c 6.1%    BMP: Date: 4/10/2024 Na 136 K 4.5 Cr 1.1 BUN 13 glucose 171 Ca 9 GFR 82  CBC: Date: 4/10/2024 WBC 17.7 Hgb 10.1 hematocrit 31.7 platelets 510    BMP: Date: 4/12/2024 Na 138 K 4.5 Cr 1.1 BUN 12 glucose 171 Ca 9 GFR 82  CBC: Date: 4/12/2024 WBC 11.5 Hgb 10.2 hematocrit 31.3 platelets 512          Lab Results   Component Value Date    WBC 17.2 (H) 11/11/2020    HGB 12.8 (L) 11/11/2020    HCT 41.9 11/11/2020     11/11/2020    ALT 12 11/11/2020    AST 14 11/11/2020     11/11/2020    K 3.7 11/11/2020     11/11/2020    CREATININE 0.96 11/11/2020    BUN 14 11/11/2020    CO2 27 11/11/2020    HGBA1C 5.6 11/11/2020             /76   Pulse 70   Temp 36.6 °C (97.9 °F)   Resp 18   Ht 1.763 m (5' 9.4\")   Wt 68.3 kg (150 lb 9.6 oz)   SpO2 96%   BMI 21.98 kg/m²      Physical Exam  Vitals and nursing note reviewed.   Constitutional:       General: He is awake.      Appearance: He is underweight.      Comments: Chronically ill   HENT:      Head: Normocephalic.      Right Ear: External ear normal.      Left Ear: External ear normal.      Nose: Nose normal.      Mouth/Throat:      Mouth: Mucous membranes are moist.      Pharynx: Oropharynx is clear.   Eyes:      Extraocular Movements: Extraocular movements intact.      Conjunctiva/sclera: Conjunctivae normal.      Pupils: Pupils are equal, round, and reactive to light.   Cardiovascular:      Rate and Rhythm: Normal rate and regular rhythm.      Pulses: Normal pulses.      Heart sounds: Normal heart sounds.   Pulmonary:      Effort: Pulmonary effort is normal.      Breath sounds: Normal breath sounds.   Abdominal:      General: Bowel sounds are normal.      Palpations: Abdomen is soft.   Musculoskeletal:         General: Normal range of motion.      Cervical back: Normal range of motion and neck supple.   Skin:     General: Skin is warm and dry.   Neurological:     "  Mental Status: He is alert and oriented to person, place, and time. Mental status is at baseline.      Motor: Weakness present.   Psychiatric:         Mood and Affect: Mood normal. Affect is flat.         Behavior: Behavior is slowed. Behavior is not agitated or aggressive. Behavior is cooperative.         Judgment: Judgment is not inappropriate.          Assessment/Plan     Ordered CMP and CBC with diff., Ha1c for next Monday      Paranoid Schizophrenia; Major Depressive Disorder; unspecified psychosis :   Continue  Fluphenazine.  Continue psychosis or agitation reported.         Type 2 Diabetes;  noncompliance with diagnostic tests:  Continue Metformin 500 mg PO BID.              Order Ha1c.              Encourage compliance with diagnostic tests.            Problem List Items Addressed This Visit       Chronic paranoid schizophrenia (Multi) - Primary    Major depressive disorder    Type 2 diabetes mellitus with hyperosmolarity without coma, without long-term current use of insulin (Multi)     Other Visit Diagnoses       Psychosis, unspecified psychosis type (Multi)        Noncompliance with diagnostic testing                        Bethany Jones, APRN-CNP

## 2024-09-18 NOTE — PROGRESS NOTES
[facility] Intensivist Services  INPATIENT PROGRESS NOTE    Patient Name: Jagdish Cook  Date of Admission: 9/16/2024  Today's Date: 09/17/24  Length of Stay: 1  Primary Care Physician: Aidan Shields MD    Subjective   Chief Complaint: Altered mental status  HPI   Mr. Cook is confused, information is obtained from the medical record and the RN.  He is a 76 year old with PMH of arthritis, chronic back pain, COPD, GERD, hypothyroidism, neuropathy, chronic urinary retention with chronic indwelling Mario catheter follows with Dr. Mittal and Dr. North.  He presented to ED today due to altered mental status.  He had a recent hospitalization for UTI and Sepsis and left AMA per discharge summary.  Review of microbiology from that visit shows that urine culture grew Proteus mirabilis. I spoke with his brother Bull ([phone]) who states Mr. Cook was placed in the nursing home about a week ago.  He received a call today from the nursing home and was told that Mr. Cook was confused and being transferred to the ER for evaluation.  According to him, Mr. Cook is usually oriented.  Evaluation in ED significant for RYNE BUN 69, creatinine 3.08 (normal on 9/3/24), troponin 308-> 219, procalcitonin 4.5, WBC 11.2, lactic acid 2.3 -> 1.4, BNP 26076, mild respiratory acidosis and hypoxia, UA significant for UTI.  He has several areas of skin breakdown and necrosis to his meatus.  He was given vancomycin, cefepime, 30 mL/kg crystalloid sepsis fluids, and started on norepinephrine in ED.  I discussed Mr. Cook's diagnostic findings and condition with his brother Bull and discussed initial treatment plan.  I discussed code status and Bull stated that Jagdish's wishes are to be DNR/DNI.  Mr. Cook will be admitted to the ICU for further care.           Review of Systems   All pertinent negatives and positives are as above. All other systems have been reviewed and are  Name: Carter Jefferson    YOB: 1960    Code Status: Full Code    Chief Complaint:   Follow up on DM 2; etc....    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     Diagnoses as follows:    Type 2 Diabetes; hypoglycemia:   On Metformin 500 mg PO BID & Glyburide 2.5 mg PO Q AM.   HA1c results:  11/18/21 HA1c 5.6 %   10/12/21 HA1c 5.5 %   5/3/2023: HA1c 5%  8/4/2023 Ha1c 5.3 %           Recent glucose on 8/3/23  labs low---44 mg/dL          Patient was recently reduced from Metformin 1000 mg PO BID to 500 mg PO BID.   Patient seen today he is in bed.  Typically the patient is usually in bed.   Calm, cooperative.  Mentation at baseline.  No complaints of chest pain, headaches or dizziness.    CAD:  On Aspirin.   No complaints of chest pain.          Paranoid Schizophrenia;unspecified psychosis; Major Depressive Disorder:   On Fluphenazine.  He is compliant with taking his medications.  Cooperative but sometimes irritable.  He is calm and cooperative during assessment today.  His mentation at baseline.  No agitation reported by staff.  Patient sleeps a lot.         Osteoarthritis:   On acetaminophen PRN for pain.   No complaints of pain today.  No reports of patient complaining of pain from staff.  Ambulates independently without any devices.  He does not walk around frequently.  No recent falls reported.     Weight loss:  Recent  negative unless otherwise stated.     Objective    Temp:  [97 °F (36.1 °C)-98.3 °F (36.8 °C)] 97.4 °F (36.3 °C)  Heart Rate:  [] 94  Resp:  [14-23] 20  BP: ()/(43-99) 98/62  Physical Exam  Physical Exam  Vitals and nursing note reviewed.   Constitutional:       Appearance: He is ill-appearing.   HENT:      Head: Normocephalic and atraumatic.      Nose: Nose normal.      Mouth/Throat:      Mouth: Mucous membranes are dry.      Pharynx: Oropharynx is clear.   Eyes:      Extraocular Movements: Extraocular movements intact.      Conjunctiva/sclera: Conjunctivae normal.      Pupils: Pupils are equal, round, and reactive to light.   Cardiovascular:      Rate and Rhythm: Normal rate and regular rhythm.      Pulses: Normal pulses.      Heart sounds: Normal heart sounds.   Pulmonary:      Effort: Pulmonary effort is normal.      Breath sounds: Rhonchi present.   Abdominal:      General: Bowel sounds are normal.      Palpations: Abdomen is soft.   Genitourinary:     Comments: Necrosis around head of penis  Musculoskeletal:         General: Deformity present.      Cervical back: Normal range of motion and neck supple.      Comments: BLE contracture   Skin:     General: Skin is warm and dry.      Capillary Refill: Capillary refill takes less than 2 seconds.      Comments: Multiple areas of skin breakdown.  See nursing documentation.   Neurological:      Mental Status: He is disoriented.   Psychiatric:      Comments: Confused, lethargic       Results Review:    XR Chest 1 View    Result Date: 9/17/2024  1. Similar patchy bibasilar opacities concerning for multifocal pneumonia. Similar positioning right IJ central line.   This report was signed and finalized on 9/17/2024 7:45 AM by Dr. Tish Guevara MD.      XR Chest 1 View    Result Date: 9/16/2024   1.  Shallow inspiration with persistent LEFT basilar opacity which may represent atelectasis or pneumonia.  2.  There is cardiomegaly and mild pulmonary vascular  weight trends:  3/3/23 158.6 #  4/2/23 145.8 #  5/3/23 145.8 #  6/2/23 145.2 #  7/5/23 143 #  8/3/23 148 #  9/1/23 145.8 #      Currently on regular diet, regular texture, thin consistency.       Diabetic neuropathy:  Not on any medications currently for neuropathy.  No complaints of neuropathy symptoms today.       Noncompliance with diagnostic testing:  Patient has been noncompliant with diagnostic testing.  He always refused blood work.  Patient recently refused blood work again in July.   Have discussed the importance of bloodwork with the patient.  He allowed blood work to be done recently a couple of times.    Generalized muscle weakness:  Patient is able to ambulate but usually is always in bed.  Does not ambulate very much.  No recent falls reported.          Reviewed  EMR  Reviewed medical, social, surgical and family history.  Reviewed all current medications and performed medication reconciliation.  Performed prescription drug management.  Reviewed vital signs AND lab results  Reviewed [facility] Documentation  Discussed patient with nursing.     ROS: 10 point ROS performed; Negative unless noted in HPI.      Surgical History  Problems    · No history of surgery    Family History  Mother    · No pertinent family history    Social History   · Current every day smoker   · Light cigarette smoker    · Lives in long-term care facility    · No illicit drug use   · Quit consuming alcohol in remote past    Allergies  NoKnown    · No Known Allergies      Outside Labs:  CBC: Date: 10/19/21, WBC: 11.6, Hgb: 12, Hct: 38.5, PLT: 310   CBC: Date: 10/12/21, WBC: 13.2, Hgb: 11.9, Hct: 38.2, PLT: 305   BMP: Date: 10/19/21, Na: 140, K: 4.2, Cl: 104, CO2: 26, BUN: 17, Cr: 1.0, Glu: 51, Ca: 9.7   BMP: Date: 10/12/21, Na: 140, K: 4.3, Cl: 103, CO2: 27, BUN: 17, Cr: 1.1, Glu: 80, Ca: 9.7   Hepatic Function Tests: Date: 10/12/21, AST: 12, ALT: 12, Alk Phos: 44, T Bili: 0.3, Albumin: 4.0   Hepatic Function Tests: Date:  "10/19/21, AST: 12, ALT: 11, Alk Phos: 40, T Bili: 0.3, Albumin: 3.9   11/18/20 HA1 C 5.6 %    10/12/21 HA1c 5.5 %.     BMP: Date: 5/3/2023 Na 141 K 4.2 Cr 0.9 BUN 17 glucose 49 Ca 9.6   CBC: Date: 5/3/2023 WBC 13.3 Hgb 11.3 hematocrit 36.5 platelets 323  Liver function: Date: 5/3/2023 ALT 7 AST 11 alkaline phos.  47 bilirubin 0.4 albumin 3.8 protein 8.2  Date: 5/3/2023: Lipid panel: Cholesterol 137; triglycerides 65; HDL 31; LDL 93.  Date 5/3/2023: HA1c 5%    RECENT LABS:  BMP: Date: 8/4/2023 Na 140 K 4.1 Cr 1.1 BUN 18 glucose 44 Ca 9.2 GFR 82  CBC: Date: 8/4/2023 WBC 10.6 Hgb 10.5 hematocrit 33.3 platelets 388  Liver function: Date: 8/4/2023 ALT 5 AST 11 alkaline phos.  57 bilirubin 0.3 albumin 3.7 protein 7.9    8/4/2023 hemoglobin A1c 5.3%    8/4/2023 vitamin D 25-hydroxy level 49.22 WNL          Lab Results   Component Value Date    WBC 17.2 (H) 11/11/2020    HGB 12.8 (L) 11/11/2020    HCT 41.9 11/11/2020     11/11/2020    ALT 12 11/11/2020    AST 14 11/11/2020     11/11/2020    K 3.7 11/11/2020     11/11/2020    CREATININE 0.96 11/11/2020    BUN 14 11/11/2020    CO2 27 11/11/2020    HGBA1C 5.6 11/11/2020             /83   Pulse 68   Temp 36.4 °C (97.6 °F)   Resp 18   Ht 1.753 m (5' 9\")   Wt 66.1 kg (145 lb 12.8 oz)   SpO2 97%   BMI 21.53 kg/m²      Physical Exam  Vitals and nursing note reviewed.   Constitutional:       General: He is awake.      Appearance: He is underweight.      Comments: Chronically ill   HENT:      Head: Normocephalic.      Right Ear: External ear normal.      Left Ear: External ear normal.      Nose: Nose normal.      Mouth/Throat:      Mouth: Mucous membranes are moist.      Pharynx: Oropharynx is clear.   Eyes:      Extraocular Movements: Extraocular movements intact.      Conjunctiva/sclera: Conjunctivae normal.      Pupils: Pupils are equal, round, and reactive to light.   Cardiovascular:      Rate and Rhythm: Normal rate and regular rhythm.      " prominence.  3.  RIGHT IJ approach central venous catheter with tip projecting over the super azygous portion of the SVC.    This report was signed and finalized on 9/16/2024 3:14 PM by Dr. Ever Jaquez MD.       Result Review:  I have personally reviewed the results from the time of this admission to 9/17/2024 20:15 CDT and agree with these findings:  [x]  Laboratory list / accordion  [x]  Microbiology  [x]  Radiology  [x]  EKG/Telemetry   []  Cardiology/Vascular   []  Pathology  []  Old records  []  Other:      Culture Data:   Blood Culture   Date Value Ref Range Status   09/16/2024 No growth at 24 hours  Preliminary   09/16/2024 No growth at 24 hours  Preliminary     Urine Culture   Date Value Ref Range Status   09/16/2024 Growth present, too young to evaluate  Preliminary       I have reviewed the patient's current medications.     Assessment/Plan   Assessment  Active Hospital Problems    Diagnosis     **Shock     RYNE (acute kidney injury)     Urinary tract infection associated with indwelling urethral catheter     Chronic indwelling Mario catheter    Plan:   Diagnoses:  -Septic Shock  -UTI  -Acute Kidney Injury, likely prerenal  -NSTEMI type II  -Deep Tissue Injury to Left Hip  -Chronic Urinary Retention with Chronic Indwelling Mario Catheter     Plan:  -Discontinued vancomycin as MRSA swab is negative.  Continued cefepime for UTI based on sensitivities. Mario catheter change in ED  - Lactate down trended and Levophed discontinued overnight.  - Was on LR to 125 cc an hour.  Will start p.o. diet and will lower rate to 75 cc/h until p.o. intake improves.   -RYNE improved with fluid resuscitation, continue to monitor, avoid nephrotoxic agents  -likely demand ischemia in the setting of septic shock, hyoxia, trend troponin, EKG no ischemic findings and troponin down trended  -wound nurse consult  - Palliative care consulted, after discussion with patient's brother and patient CODE STATUS changed to DNR/DNI     VTE  Prophylaxis:  Pharmacologic & mechanical VTE prophylaxis orders are present.      Conditions and Status        Condition is improving.        Decision rules/scores evaluated (example [id number], Wells, etc):/A     Care Planning  Code status and discussions: DNR/DNI after discussion with palliative care      Disposition  Patient to remain in ICU    I provided 40 minutes of total critical care time. Due to the high probability of clinically significant, life-threatening deterioration, the patient required my direct and personal management. The critical care time does not include time spent on separately billable procedures.     Electronically signed by Kristen Sánchez MD on 9/17/2024 at 20:15 CDT     Pulses: Normal pulses.      Heart sounds: Normal heart sounds.   Pulmonary:      Effort: Pulmonary effort is normal.      Breath sounds: Normal breath sounds.   Abdominal:      General: Bowel sounds are normal.      Palpations: Abdomen is soft.   Musculoskeletal:         General: Normal range of motion.      Cervical back: Normal range of motion and neck supple.   Skin:     General: Skin is warm and dry.   Neurological:      Mental Status: He is alert and oriented to person, place, and time. Mental status is at baseline.      Motor: Weakness present.   Psychiatric:         Mood and Affect: Mood normal. Affect is flat.         Behavior: Behavior is slowed. Behavior is not agitated or aggressive. Behavior is cooperative.         Judgment: Judgment is not inappropriate.          Assessment/Plan       Type 2 Diabetes; hypoglycemia:  Continue Metformin 500 mg PO BID.   Discontinue Glyburide 2.5 mg PO Q AM.   Monitor Ha1c Q 3 months.  ###Start to monitor accuchecks Q AM ROUTINELY for 2 weeks then Q AM once a week.  Start diabetic snack at bedtime.   If accuchecks remain WNL, will discontinue metformin 500 mg PO BID and start metformin 500 mg PO Q AM.    CAD:      Continue Aspirin.       Monitor for chest pain.          Paranoid Schizophrenia; unspecified psychosis; Major Depressive Disorder:   Continue Fluphenazine.  He is compliant with taking his medications.  Monitor for agitation, psychosis, and behaviors.      Osteoarthritis:   Continue acetaminophen PRN for pain.     Weight loss:      Monitor weights.     Dietary consult for weight loss.     Diabetic neuropathy:  Not on any medications currently for neuropathy.      Noncompliance with diagnostic testing:  Patient has been noncompliant with diagnostic testing.  Have discussed the importance of bloodwork with the patient.  He allowed blood work to be done recently a couple of times.    Generalized muscle weakness:  Patient is able to ambulate but usually is always in  bed.  Does not ambulate very much.  Fall prevention strategies.            Problem List Items Addressed This Visit          Cardiac and Vasculature    CAD (coronary artery disease)       Endocrine/Metabolic    Type 2 diabetes mellitus (CMS/[facility]) - Primary       Mental Health    Chronic paranoid schizophrenia (CMS/[facility])    Major depressive disorder       Neuro    Diabetic neuropathy (CMS/[facility])     Other Visit Diagnoses       Hypoglycemia        Psychosis, unspecified psychosis type (CMS/[facility])        Osteoarthritis, unspecified osteoarthritis type, unspecified site        Weight loss        Noncompliance with diagnostic testing        Generalized muscle weakness            Bethany Jones, APRN-CNP

## 2024-09-20 ENCOUNTER — NURSING HOME VISIT (OUTPATIENT)
Dept: POST ACUTE CARE | Facility: EXTERNAL LOCATION | Age: 64
End: 2024-09-20
Payer: MEDICAID

## 2024-09-20 DIAGNOSIS — F20.0 CHRONIC PARANOID SCHIZOPHRENIA (MULTI): Primary | ICD-10-CM

## 2024-09-20 DIAGNOSIS — E11.00 TYPE 2 DIABETES MELLITUS WITH HYPEROSMOLARITY WITHOUT COMA, WITHOUT LONG-TERM CURRENT USE OF INSULIN (MULTI): ICD-10-CM

## 2024-09-20 DIAGNOSIS — F33.9 RECURRENT MAJOR DEPRESSIVE DISORDER, REMISSION STATUS UNSPECIFIED (CMS-HCC): ICD-10-CM

## 2024-09-20 PROCEDURE — 99308 SBSQ NF CARE LOW MDM 20: CPT | Performed by: INTERNAL MEDICINE

## 2024-09-20 NOTE — LETTER
Patient: Carter Jefferson  : 1960    Encounter Date: 2024    Subjective- monthly follow up.  H/O schizophrenia, hypertension, hyperlipidemia, diabetes seen today for a routine visit .He is lying in his bed .Denies any complaints no concerns per staff.  ROS:  General-no fever  Chest-no pain  Respiratory-nocough  Abdomen-no pain or diarrhea  General: no acute distress  Pain:  Vital signs: /80  T 98.2  79 Weight 150 lbs  Gen- NAD,alert  Lungs: clear to auscultation B/L  Cardio: S1-S2 normal  ABD: Soft, nontender  Musc/Skel:No deformities  Extremities: No pedal edema  Neuro: No neurological deficits  Psych: Schizophrenia  Assessment/plan-  Hypertension- stable  c/w ASA  Hyperlipidemia-  Diabetes type 2-  c/w Metformin  Schizophrenia-continue with fluphenazine  Follow-up with psych  Weakness- c/w supportive care      Electronically Signed By: Dianne Jewell MD   24  3:33 PM

## 2024-09-30 NOTE — PROGRESS NOTES
Subjective- monthly follow up.  H/O schizophrenia, hypertension, hyperlipidemia, diabetes seen today for a routine visit .He is lying in his bed .Denies any complaints no concerns per staff.  ROS:  General-no fever  Chest-no pain  Respiratory-nocough  Abdomen-no pain or diarrhea  General: no acute distress  Pain:  Vital signs: /80  T 98.2  79 Weight 150 lbs  Gen- NAD,alert  Lungs: clear to auscultation B/L  Cardio: S1-S2 normal  ABD: Soft, nontender  Musc/Skel:No deformities  Extremities: No pedal edema  Neuro: No neurological deficits  Psych: Schizophrenia  Assessment/plan-  Hypertension- stable  c/w ASA  Hyperlipidemia-  Diabetes type 2-  c/w Metformin  Schizophrenia-continue with fluphenazine  Follow-up with psych  Weakness- c/w supportive care

## 2024-10-08 ENCOUNTER — NURSING HOME VISIT (OUTPATIENT)
Dept: POST ACUTE CARE | Facility: EXTERNAL LOCATION | Age: 64
End: 2024-10-08
Payer: MEDICAID

## 2024-10-08 DIAGNOSIS — F29 PSYCHOSIS, UNSPECIFIED PSYCHOSIS TYPE (MULTI): ICD-10-CM

## 2024-10-08 DIAGNOSIS — E11.9 TYPE 2 DIABETES MELLITUS WITHOUT COMPLICATION, WITHOUT LONG-TERM CURRENT USE OF INSULIN (MULTI): Primary | ICD-10-CM

## 2024-10-08 DIAGNOSIS — Z91.199 NONCOMPLIANCE WITH DIAGNOSTIC TESTING: ICD-10-CM

## 2024-10-08 DIAGNOSIS — F33.9 RECURRENT MAJOR DEPRESSIVE DISORDER, REMISSION STATUS UNSPECIFIED (CMS-HCC): ICD-10-CM

## 2024-10-08 DIAGNOSIS — F20.0 CHRONIC PARANOID SCHIZOPHRENIA (MULTI): ICD-10-CM

## 2024-10-08 NOTE — LETTER
Patient: Carter Jefferson  : 1960    Encounter Date: 10/08/2024    Name: Carter Jefferson    YOB: 1960    Code Status: Full Code    Chief Complaint:   Follow up on DM 2; etc....    HPI       64 year old male at [facility] in [facility] Term [facility].  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     Diagnoses as follows:    Type 2 Diabetes;  Noncompliance with diagnostic tests:  On Metformin 500 mg PO BID.               Ha1c trends:  21 HA1c 5.6 %   10/12/21 HA1c 5.5 %   5/3/2023: HA1c 5%  2023 Ha1c 5.3 %  2023: Hemoglobin A1c 6.1%  Monitoring Ha1c q 3 months.  Recent Ha1c was not completed because patient has refused blood work the last few times it was ordered.  Discussed lab work with patient and nurses.   He states he will allow bloodwork to be performed, but when they come to draw the blood he refuses.   No hypoglycemia reported.  No complaints of chest pain, headaches or dizziness.  No nausea or vomiting.   No abdominal pain.  Patient seen today.  Calm, cooperative.  Mentation at baseline.          Major Depressive Disorder; Paranoid Schizophrenia;    unspecified psychosis :   On Fluphenazine.  No psychosis or agitation reported.   Nursing reports he is  compliant with taking his medications.  His mentation is at baseline.  No recent agitation reported by staff.  Patient generally sleeps a lot  and likes to stay in bed.                                      Reviewed  EMR  Reviewed medical, social, surgical and family history.  Reviewed all current medications and performed medication reconciliation.  Performed prescription drug management.  Reviewed vital signs AND lab results  Reviewed [facility] Documentation  Discussed patient with nursing.     ROS: 10 point ROS performed; Negative unless noted in HPI.      Surgical History  Problems    · No history of surgery    Family History  Mother    · No pertinent family history    Social History   · Current every day smoker   · Light cigarette smoker    · Lives in long-term care facility    · No illicit drug use   · Quit consuming alcohol in remote past    Allergies  NoKnown    · No Known Allergies      Outside Labs:  CBC: Date: 10/19/21, WBC: 11.6, Hgb: 12, Hct: 38.5, PLT: 310   CBC: Date: 10/12/21, WBC: 13.2, Hgb: 11.9, Hct: 38.2, PLT: 305   BMP: Date: 10/19/21, Na: 140, K: 4.2, Cl: 104, CO2: 26, BUN: 17, Cr: 1.0, Glu: 51, Ca: 9.7   BMP: Date: 10/12/21, Na: 140, K: 4.3, Cl: 103, CO2: 27, BUN: 17, Cr: 1.1, Glu: 80, Ca: 9.7   Hepatic Function Tests: Date: 10/12/21, AST: 12, ALT: 12, Alk Phos: 44, T Bili: 0.3, Albumin: 4.0   Hepatic Function Tests: Date: 10/19/21, AST: 12, ALT: 11, Alk Phos: 40, T Bili: 0.3, Albumin: 3.9   11/18/20 HA1 C 5.6 %    10/12/21 HA1c 5.5 %.     BMP: Date: 5/3/2023 Na 141 K 4.2 Cr 0.9 BUN 17 glucose 49 Ca 9.6   CBC: Date: 5/3/2023 WBC 13.3 Hgb 11.3 hematocrit 36.5 platelets 323  Liver function: Date: 5/3/2023 ALT 7 AST 11 alkaline phos.  47 bilirubin 0.4 albumin 3.8 protein 8.2  Date: 5/3/2023: Lipid panel: Cholesterol 137; triglycerides 65; HDL 31; LDL 93.  Date 5/3/2023: HA1c 5%    BMP: Date: 8/4/2023 Na 140 K 4.1 Cr 1.1 BUN 18 glucose 44 Ca 9.2 GFR 82  CBC: Date: 8/4/2023 WBC 10.6 Hgb 10.5 hematocrit 33.3 platelets 388  Liver function: Date: 8/4/2023 ALT 5 AST 11 alkaline phos.  57 bilirubin 0.3 albumin 3.7 protein  "7.9    8/4/2023 hemoglobin A1c 5.3%    8/4/2023 vitamin D 25-hydroxy level 49.22 WNL    BMP: Date: 11/7/2023 Na 140 K 4.7 Cr 1.1 BUN 18 glucose 96 Ca 9.4 GFR 82  CBC: Date: 11/7/2023 WBC 10.1 Hgb 11 hematocrit 35.4 platelets 278    11/7/2023: Hemoglobin A1c 6.1%    BMP: Date: 4/10/2024 Na 136 K 4.5 Cr 1.1 BUN 13 glucose 171 Ca 9 GFR 82  CBC: Date: 4/10/2024 WBC 17.7 Hgb 10.1 hematocrit 31.7 platelets 510    BMP: Date: 4/12/2024 Na 138 K 4.5 Cr 1.1 BUN 12 glucose 171 Ca 9 GFR 82  CBC: Date: 4/12/2024 WBC 11.5 Hgb 10.2 hematocrit 31.3 platelets 512          Lab Results   Component Value Date    WBC 17.2 (H) 11/11/2020    HGB 12.8 (L) 11/11/2020    HCT 41.9 11/11/2020     11/11/2020    ALT 12 11/11/2020    AST 14 11/11/2020     11/11/2020    K 3.7 11/11/2020     11/11/2020    CREATININE 0.96 11/11/2020    BUN 14 11/11/2020    CO2 27 11/11/2020    HGBA1C 5.6 11/11/2020             /85   Pulse 70   Temp 36 °C (96.8 °F)   Resp 18   Ht 1.763 m (5' 9.4\")   Wt 68.5 kg (151 lb)   SpO2 97%   BMI 22.04 kg/m²      Physical Exam  Vitals and nursing note reviewed.   Constitutional:       General: He is awake.      Appearance: He is underweight.      Comments: Chronically ill   HENT:      Head: Normocephalic.      Right Ear: External ear normal.      Left Ear: External ear normal.      Nose: Nose normal.      Mouth/Throat:      Mouth: Mucous membranes are moist.      Pharynx: Oropharynx is clear.   Eyes:      Extraocular Movements: Extraocular movements intact.      Conjunctiva/sclera: Conjunctivae normal.      Pupils: Pupils are equal, round, and reactive to light.   Cardiovascular:      Rate and Rhythm: Normal rate and regular rhythm.      Pulses: Normal pulses.      Heart sounds: Normal heart sounds.   Pulmonary:      Effort: Pulmonary effort is normal.      Breath sounds: Normal breath sounds.   Abdominal:      General: Bowel sounds are normal.      Palpations: Abdomen is soft.   Musculoskeletal: "         General: Normal range of motion.      Cervical back: Normal range of motion and neck supple.   Skin:     General: Skin is warm and dry.   Neurological:      Mental Status: He is alert and oriented to person, place, and time. Mental status is at baseline.      Motor: Weakness present.   Psychiatric:         Mood and Affect: Mood normal. Affect is flat.         Behavior: Behavior is slowed. Behavior is not agitated or aggressive. Behavior is cooperative.         Judgment: Judgment is not inappropriate.          Assessment/Plan    Ordered CMP and CBC with diff., Ha1c for tomorrow.    Type 2 Diabetes;  noncompliance with diagnostic tests:  Continue Metformin 500 mg PO BID.              Order Ha1c.              Encourage compliance with diagnostic tests.    Major Depressive Disorder; Paranoid Schizophrenia; unspecified psychosis :   Continue  Fluphenazine.  Continue psychosis or agitation reported.                   Problem List Items Addressed This Visit       Chronic paranoid schizophrenia (Multi)    Major depressive disorder     Other Visit Diagnoses       Type 2 diabetes mellitus without complication, without long-term current use of insulin (Multi)    -  Primary    Noncompliance with diagnostic testing        Psychosis, unspecified psychosis type (Multi)                          CHON Valentine       Electronically Signed By: CHON Valentine   10/12/24  6:34 PM

## 2024-10-12 VITALS
WEIGHT: 151 LBS | BODY MASS INDEX: 22.36 KG/M2 | HEART RATE: 70 BPM | OXYGEN SATURATION: 97 % | HEIGHT: 69 IN | RESPIRATION RATE: 18 BRPM | TEMPERATURE: 96.8 F | SYSTOLIC BLOOD PRESSURE: 121 MMHG | DIASTOLIC BLOOD PRESSURE: 85 MMHG

## 2024-10-12 NOTE — PROGRESS NOTES
Name: Carter Jefferson    YOB: 1960    Code Status: Full Code    Chief Complaint:   Follow up on DM 2; etc....    HPI       64 year old male at [facility] in Long Term Care.  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     Diagnoses as follows:    Type 2 Diabetes;  Noncompliance with diagnostic tests:  On Metformin 500 mg PO BID.               Ha1c trends:  11/18/21 HA1c 5.6 %   10/12/21 HA1c 5.5 %   5/3/2023: HA1c 5%  8/4/2023 Ha1c 5.3 %  11/7/2023: Hemoglobin A1c 6.1%  Monitoring Ha1c q 3 months.  Recent Ha1c was not completed because patient has refused blood work the last few times it was ordered.  Discussed lab work with patient and nurses.   He states he will allow bloodwork to be performed, but when they come to draw the blood he refuses.   No hypoglycemia reported.  No complaints of chest pain, headaches or dizziness.  No nausea or vomiting.   No abdominal pain.  Patient seen today.  Calm, cooperative.  Mentation at baseline.          Major Depressive Disorder; Paranoid Schizophrenia;    unspecified psychosis :   On Fluphenazine.  No psychosis or agitation reported.   Nursing reports he is  compliant with taking his medications.  His mentation is at baseline.  No recent agitation reported by staff.  Patient generally sleeps a lot and likes to stay in bed.                                      Reviewed   EMR  Reviewed medical, social, surgical and family history.  Reviewed all current medications and performed medication reconciliation.  Performed prescription drug management.  Reviewed vital signs AND lab results  Reviewed [facility] Care Documentation  Discussed patient with nursing.     ROS: 10 point ROS performed; Negative unless noted in HPI.      Surgical History  Problems    · No history of surgery    Family History  Mother    · No pertinent family history    Social History   · Current every day smoker   · Light cigarette smoker    · Lives in long-term care facility    · No illicit drug use   · Quit consuming alcohol in remote past    Allergies  NoKnown    · No Known Allergies      Outside Labs:  CBC: Date: 10/19/21, WBC: 11.6, Hgb: 12, Hct: 38.5, PLT: 310   CBC: Date: 10/12/21, WBC: 13.2, Hgb: 11.9, Hct: 38.2, PLT: 305   BMP: Date: 10/19/21, Na: 140, K: 4.2, Cl: 104, CO2: 26, BUN: 17, Cr: 1.0, Glu: 51, Ca: 9.7   BMP: Date: 10/12/21, Na: 140, K: 4.3, Cl: 103, CO2: 27, BUN: 17, Cr: 1.1, Glu: 80, Ca: 9.7   Hepatic Function Tests: Date: 10/12/21, AST: 12, ALT: 12, Alk Phos: 44, T Bili: 0.3, Albumin: 4.0   Hepatic Function Tests: Date: 10/19/21, AST: 12, ALT: 11, Alk Phos: 40, T Bili: 0.3, Albumin: 3.9   11/18/20 HA1 C 5.6 %    10/12/21 HA1c 5.5 %.     BMP: Date: 5/3/2023 Na 141 K 4.2 Cr 0.9 BUN 17 glucose 49 Ca 9.6   CBC: Date: 5/3/2023 WBC 13.3 Hgb 11.3 hematocrit 36.5 platelets 323  Liver function: Date: 5/3/2023 ALT 7 AST 11 alkaline phos.  47 bilirubin 0.4 albumin 3.8 protein 8.2  Date: 5/3/2023: Lipid panel: Cholesterol 137; triglycerides 65; HDL 31; LDL 93.  Date 5/3/2023: HA1c 5%    BMP: Date: 8/4/2023 Na 140 K 4.1 Cr 1.1 BUN 18 glucose 44 Ca 9.2 GFR 82  CBC: Date: 8/4/2023 WBC 10.6 Hgb 10.5 hematocrit 33.3 platelets 388  Liver function: Date: 8/4/2023 ALT 5 AST 11 alkaline phos.  57 bilirubin 0.3 albumin 3.7 protein 7.9    8/4/2023 hemoglobin A1c 5.3%    8/4/2023 vitamin D 25-hydroxy level 49.22  "WNL    BMP: Date: 11/7/2023 Na 140 K 4.7 Cr 1.1 BUN 18 glucose 96 Ca 9.4 GFR 82  CBC: Date: 11/7/2023 WBC 10.1 Hgb 11 hematocrit 35.4 platelets 278    11/7/2023: Hemoglobin A1c 6.1%    BMP: Date: 4/10/2024 Na 136 K 4.5 Cr 1.1 BUN 13 glucose 171 Ca 9 GFR 82  CBC: Date: 4/10/2024 WBC 17.7 Hgb 10.1 hematocrit 31.7 platelets 510    BMP: Date: 4/12/2024 Na 138 K 4.5 Cr 1.1 BUN 12 glucose 171 Ca 9 GFR 82  CBC: Date: 4/12/2024 WBC 11.5 Hgb 10.2 hematocrit 31.3 platelets 512          Lab Results   Component Value Date    WBC 17.2 (H) 11/11/2020    HGB 12.8 (L) 11/11/2020    HCT 41.9 11/11/2020     11/11/2020    ALT 12 11/11/2020    AST 14 11/11/2020     11/11/2020    K 3.7 11/11/2020     11/11/2020    CREATININE 0.96 11/11/2020    BUN 14 11/11/2020    CO2 27 11/11/2020    HGBA1C 5.6 11/11/2020             /85   Pulse 70   Temp 36 °C (96.8 °F)   Resp 18   Ht 1.763 m (5' 9.4\")   Wt 68.5 kg (151 lb)   SpO2 97%   BMI 22.04 kg/m²      Physical Exam  Vitals and nursing note reviewed.   Constitutional:       General: He is awake.      Appearance: He is underweight.      Comments: Chronically ill   HENT:      Head: Normocephalic.      Right Ear: External ear normal.      Left Ear: External ear normal.      Nose: Nose normal.      Mouth/Throat:      Mouth: Mucous membranes are moist.      Pharynx: Oropharynx is clear.   Eyes:      Extraocular Movements: Extraocular movements intact.      Conjunctiva/sclera: Conjunctivae normal.      Pupils: Pupils are equal, round, and reactive to light.   Cardiovascular:      Rate and Rhythm: Normal rate and regular rhythm.      Pulses: Normal pulses.      Heart sounds: Normal heart sounds.   Pulmonary:      Effort: Pulmonary effort is normal.      Breath sounds: Normal breath sounds.   Abdominal:      General: Bowel sounds are normal.      Palpations: Abdomen is soft.   Musculoskeletal:         General: Normal range of motion.      Cervical back: Normal range of " motion and neck supple.   Skin:     General: Skin is warm and dry.   Neurological:      Mental Status: He is alert and oriented to person, place, and time. Mental status is at baseline.      Motor: Weakness present.   Psychiatric:         Mood and Affect: Mood normal. Affect is flat.         Behavior: Behavior is slowed. Behavior is not agitated or aggressive. Behavior is cooperative.         Judgment: Judgment is not inappropriate.          Assessment/Plan     Ordered CMP and CBC with diff., Ha1c for tomorrow.    Type 2 Diabetes;  noncompliance with diagnostic tests:  Continue Metformin 500 mg PO BID.              Order Ha1c.              Encourage compliance with diagnostic tests.    Major Depressive Disorder; Paranoid Schizophrenia; unspecified psychosis :   Continue  Fluphenazine.  Continue psychosis or agitation reported.                   Problem List Items Addressed This Visit       Chronic paranoid schizophrenia (Multi)    Major depressive disorder     Other Visit Diagnoses       Type 2 diabetes mellitus without complication, without long-term current use of insulin (Multi)    -  Primary    Noncompliance with diagnostic testing        Psychosis, unspecified psychosis type (Multi)                          Bethany Jones, GARY-CNP

## 2024-11-07 ENCOUNTER — NURSING HOME VISIT (OUTPATIENT)
Dept: POST ACUTE CARE | Facility: EXTERNAL LOCATION | Age: 64
End: 2024-11-07
Payer: MEDICAID

## 2024-11-07 DIAGNOSIS — F29 PSYCHOSIS, UNSPECIFIED PSYCHOSIS TYPE (MULTI): ICD-10-CM

## 2024-11-07 DIAGNOSIS — E11.9 TYPE 2 DIABETES MELLITUS WITHOUT COMPLICATION, WITHOUT LONG-TERM CURRENT USE OF INSULIN (MULTI): ICD-10-CM

## 2024-11-07 DIAGNOSIS — F20.0 CHRONIC PARANOID SCHIZOPHRENIA (MULTI): Primary | ICD-10-CM

## 2024-11-07 DIAGNOSIS — Z91.199 NONCOMPLIANCE WITH DIAGNOSTIC TESTING: ICD-10-CM

## 2024-11-07 DIAGNOSIS — F33.9 RECURRENT MAJOR DEPRESSIVE DISORDER, REMISSION STATUS UNSPECIFIED (CMS-HCC): ICD-10-CM

## 2024-11-07 PROCEDURE — 99308 SBSQ NF CARE LOW MDM 20: CPT | Performed by: NURSE PRACTITIONER

## 2024-11-07 NOTE — LETTER
Patient: Carter Jefferson  : 1960    Encounter Date: 2024    Name: Carter Jefferson    YOB: 1960    Code Status: Full Code    Chief Complaint:   Follow up on DM 2; etc....    HPI       64 year old male at [facility] in Long Term Care.  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     Diagnoses as follows:    Paranoid Schizophrenia; Major Depressive Disorder;  unspecified psychosis :   On Fluphenazine.  No psychosis or agitation reported.   Nursing reports he is  compliant with taking his medications.  His mentation is at baseline.  No recent agitation reported by staff.  Patient generally sleeps a lot and likes to stay in bed.    Type 2 Diabetes;  Noncompliance with diagnostic tests:  On Metformin 500 mg PO BID.               Ha1c trends:  21 HA1c 5.6 %   10/12/21 HA1c 5.5 %   5/3/2023: HA1c 5%  2023 Ha1c 5.3 %  2023: Hemoglobin A1c 6.1%  Monitoring Ha1c q 3 months.  Recent Ha1c was not completed because patient has refused blood work the last few times it was ordered.  Discussed lab work with patient and nurses.   He states he will allow bloodwork to be performed, but when they come to draw the blood he refuses.   No hypoglycemia reported.  No complaints of chest pain, headaches or dizziness.  No nausea or vomiting.   No abdominal pain.  Patient seen today.  Calm,  cooperative.  Mentation at baseline.                                            Reviewed  EMR  Reviewed medical, social, surgical and family history.  Reviewed all current medications and performed medication reconciliation.  Performed prescription drug management.  Reviewed vital signs AND lab results  Reviewed [facility] Documentation  Discussed patient with nursing.     ROS: 10 point ROS performed; Negative unless noted in HPI.      Surgical History  Problems    · No history of surgery    Family History  Mother    · No pertinent family history    Social History   · Current every day smoker   · Light cigarette smoker    · Lives in long-term care facility    · No illicit drug use   · Quit consuming alcohol in remote past    Allergies  NoKnown    · No Known Allergies      Outside Labs:  CBC: Date: 10/19/21, WBC: 11.6, Hgb: 12, Hct: 38.5, PLT: 310   CBC: Date: 10/12/21, WBC: 13.2, Hgb: 11.9, Hct: 38.2, PLT: 305   BMP: Date: 10/19/21, Na: 140, K: 4.2, Cl: 104, CO2: 26, BUN: 17, Cr: 1.0, Glu: 51, Ca: 9.7   BMP: Date: 10/12/21, Na: 140, K: 4.3, Cl: 103, CO2: 27, BUN: 17, Cr: 1.1, Glu: 80, Ca: 9.7   Hepatic Function Tests: Date: 10/12/21, AST: 12, ALT: 12, Alk Phos: 44, T Bili: 0.3, Albumin: 4.0   Hepatic Function Tests: Date: 10/19/21, AST: 12, ALT: 11, Alk Phos: 40, T Bili: 0.3, Albumin: 3.9   11/18/20 HA1 C 5.6 %    10/12/21 HA1c 5.5 %.     BMP: Date: 5/3/2023 Na 141 K 4.2 Cr 0.9 BUN 17 glucose 49 Ca 9.6   CBC: Date: 5/3/2023 WBC 13.3 Hgb 11.3 hematocrit 36.5 platelets 323  Liver function: Date: 5/3/2023 ALT 7 AST 11 alkaline phos.  47 bilirubin 0.4 albumin 3.8 protein 8.2  Date: 5/3/2023: Lipid panel: Cholesterol 137; triglycerides 65; HDL 31; LDL 93.  Date 5/3/2023: HA1c 5%    BMP: Date: 8/4/2023 Na 140 K 4.1 Cr 1.1 BUN 18 glucose 44 Ca 9.2 GFR 82  CBC: Date: 8/4/2023 WBC 10.6 Hgb 10.5 hematocrit 33.3 platelets 388  Liver function: Date: 8/4/2023 ALT 5 AST 11 alkaline phos.  57 bilirubin 0.3 albumin  "3.7 protein 7.9    8/4/2023 hemoglobin A1c 5.3%    8/4/2023 vitamin D 25-hydroxy level 49.22 WNL    BMP: Date: 11/7/2023 Na 140 K 4.7 Cr 1.1 BUN 18 glucose 96 Ca 9.4 GFR 82  CBC: Date: 11/7/2023 WBC 10.1 Hgb 11 hematocrit 35.4 platelets 278    11/7/2023: Hemoglobin A1c 6.1%    BMP: Date: 4/10/2024 Na 136 K 4.5 Cr 1.1 BUN 13 glucose 171 Ca 9 GFR 82  CBC: Date: 4/10/2024 WBC 17.7 Hgb 10.1 hematocrit 31.7 platelets 510    BMP: Date: 4/12/2024 Na 138 K 4.5 Cr 1.1 BUN 12 glucose 171 Ca 9 GFR 82  CBC: Date: 4/12/2024 WBC 11.5 Hgb 10.2 hematocrit 31.3 platelets 512    BMP: Date: 10/25/2024 Na 137 K 4.4 Cr 1.2 BUN 14 glucose 164 Ca 9.5 GFR 74  CBC: Date: 10/25/2024 WBC 13.7 Hgb 10.5 hematocrit 33.8 platelets 295      Lab Results   Component Value Date    WBC 17.2 (H) 11/11/2020    HGB 12.8 (L) 11/11/2020    HCT 41.9 11/11/2020     11/11/2020    ALT 12 11/11/2020    AST 14 11/11/2020     11/11/2020    K 3.7 11/11/2020     11/11/2020    CREATININE 0.96 11/11/2020    BUN 14 11/11/2020    CO2 27 11/11/2020    HGBA1C 5.6 11/11/2020             /71   Pulse 68   Temp 36.2 °C (97.1 °F)   Resp 18   Ht 1.763 m (5' 9.4\")   Wt 70.7 kg (155 lb 12.8 oz)   SpO2 98%   BMI 22.74 kg/m²      Physical Exam  Vitals and nursing note reviewed.   Constitutional:       General: He is awake.      Appearance: He is underweight.      Comments: Chronically ill   HENT:      Head: Normocephalic.      Right Ear: External ear normal.      Left Ear: External ear normal.      Nose: Nose normal.      Mouth/Throat:      Mouth: Mucous membranes are moist.      Pharynx: Oropharynx is clear.   Eyes:      Extraocular Movements: Extraocular movements intact.      Conjunctiva/sclera: Conjunctivae normal.      Pupils: Pupils are equal, round, and reactive to light.   Cardiovascular:      Rate and Rhythm: Normal rate and regular rhythm.      Pulses: Normal pulses.      Heart sounds: Normal heart sounds.   Pulmonary:      Effort: " Pulmonary effort is normal.      Breath sounds: Normal breath sounds.   Abdominal:      General: Bowel sounds are normal.      Palpations: Abdomen is soft.   Musculoskeletal:         General: Normal range of motion.      Cervical back: Normal range of motion and neck supple.   Skin:     General: Skin is warm and dry.   Neurological:      Mental Status: He is alert and oriented to person, place, and time. Mental status is at baseline.      Motor: Weakness present.   Psychiatric:         Mood and Affect: Mood normal. Affect is flat.         Behavior: Behavior is slowed. Behavior is not agitated or aggressive. Behavior is cooperative.         Judgment: Judgment is not inappropriate.          Assessment/Plan      Paranoid Schizophrenia; Major Depressive Disorder; unspecified psychosis :   Continue  Fluphenazine.  Continue psychosis or agitation reported.     Type 2 Diabetes;  noncompliance with diagnostic tests:  Continue Metformin 500 mg PO BID.              Order Ha1c.              Encourage compliance with diagnostic tests.        Problem List Items Addressed This Visit       Chronic paranoid schizophrenia (Multi) - Primary    Major depressive disorder     Other Visit Diagnoses       Psychosis, unspecified psychosis type (Multi)        Type 2 diabetes mellitus without complication, without long-term current use of insulin (Multi)        Noncompliance with diagnostic testing                    CHON Valentine       Electronically Signed By: CHON Valentine   11/10/24  7:55 PM

## 2024-11-10 VITALS
TEMPERATURE: 97.1 F | OXYGEN SATURATION: 98 % | WEIGHT: 155.8 LBS | DIASTOLIC BLOOD PRESSURE: 71 MMHG | HEIGHT: 69 IN | BODY MASS INDEX: 23.08 KG/M2 | HEART RATE: 68 BPM | SYSTOLIC BLOOD PRESSURE: 125 MMHG | RESPIRATION RATE: 18 BRPM

## 2024-11-11 NOTE — PROGRESS NOTES
Name: Carter Jefferson    YOB: 1960    Code Status: Full Code    Chief Complaint:   Follow up on DM 2; etc....    HPI       64 year old male at [facility] in Long Term Care.  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     Diagnoses as follows:    Paranoid Schizophrenia; Major Depressive Disorder;  unspecified psychosis :   On Fluphenazine.  No psychosis or agitation reported.   Nursing reports he is  compliant with taking his medications.  His mentation is at baseline.  No recent agitation reported by staff.  Patient generally sleeps a lot and likes to stay in bed.    Type 2 Diabetes;  Noncompliance with diagnostic tests:  On Metformin 500 mg PO BID.               Ha1c trends:  11/18/21 HA1c 5.6 %   10/12/21 HA1c 5.5 %   5/3/2023: HA1c 5%  8/4/2023 Ha1c 5.3 %  11/7/2023: Hemoglobin A1c 6.1%  Monitoring Ha1c q 3 months.  Recent Ha1c was not completed because patient has refused blood work the last few times it was ordered.  Discussed lab work with patient and nurses.   He states he will allow bloodwork to be performed, but when they come to draw the blood he refuses.   No hypoglycemia reported.  No complaints of chest pain, headaches or dizziness.  No nausea or vomiting.   No abdominal pain.  Patient seen today.  Calm, cooperative.  Mentation at baseline.                                            Reviewed   EMR  Reviewed medical, social, surgical and family history.  Reviewed all current medications and performed medication reconciliation.  Performed prescription drug management.  Reviewed vital signs AND lab results  Reviewed [facility] Care Documentation  Discussed patient with nursing.     ROS: 10 point ROS performed; Negative unless noted in HPI.      Surgical History  Problems    · No history of surgery    Family History  Mother    · No pertinent family history    Social History   · Current every day smoker   · Light cigarette smoker    · Lives in long-term care facility    · No illicit drug use   · Quit consuming alcohol in remote past    Allergies  NoKnown    · No Known Allergies      Outside Labs:  CBC: Date: 10/19/21, WBC: 11.6, Hgb: 12, Hct: 38.5, PLT: 310   CBC: Date: 10/12/21, WBC: 13.2, Hgb: 11.9, Hct: 38.2, PLT: 305   BMP: Date: 10/19/21, Na: 140, K: 4.2, Cl: 104, CO2: 26, BUN: 17, Cr: 1.0, Glu: 51, Ca: 9.7   BMP: Date: 10/12/21, Na: 140, K: 4.3, Cl: 103, CO2: 27, BUN: 17, Cr: 1.1, Glu: 80, Ca: 9.7   Hepatic Function Tests: Date: 10/12/21, AST: 12, ALT: 12, Alk Phos: 44, T Bili: 0.3, Albumin: 4.0   Hepatic Function Tests: Date: 10/19/21, AST: 12, ALT: 11, Alk Phos: 40, T Bili: 0.3, Albumin: 3.9   11/18/20 HA1 C 5.6 %    10/12/21 HA1c 5.5 %.     BMP: Date: 5/3/2023 Na 141 K 4.2 Cr 0.9 BUN 17 glucose 49 Ca 9.6   CBC: Date: 5/3/2023 WBC 13.3 Hgb 11.3 hematocrit 36.5 platelets 323  Liver function: Date: 5/3/2023 ALT 7 AST 11 alkaline phos.  47 bilirubin 0.4 albumin 3.8 protein 8.2  Date: 5/3/2023: Lipid panel: Cholesterol 137; triglycerides 65; HDL 31; LDL 93.  Date 5/3/2023: HA1c 5%    BMP: Date: 8/4/2023 Na 140 K 4.1 Cr 1.1 BUN 18 glucose 44 Ca 9.2 GFR 82  CBC: Date: 8/4/2023 WBC 10.6 Hgb 10.5 hematocrit 33.3 platelets 388  Liver function: Date: 8/4/2023 ALT 5 AST 11 alkaline phos.  57 bilirubin 0.3 albumin 3.7 protein 7.9    8/4/2023 hemoglobin A1c 5.3%    8/4/2023 vitamin D 25-hydroxy level 49.22  "WNL    BMP: Date: 11/7/2023 Na 140 K 4.7 Cr 1.1 BUN 18 glucose 96 Ca 9.4 GFR 82  CBC: Date: 11/7/2023 WBC 10.1 Hgb 11 hematocrit 35.4 platelets 278    11/7/2023: Hemoglobin A1c 6.1%    BMP: Date: 4/10/2024 Na 136 K 4.5 Cr 1.1 BUN 13 glucose 171 Ca 9 GFR 82  CBC: Date: 4/10/2024 WBC 17.7 Hgb 10.1 hematocrit 31.7 platelets 510    BMP: Date: 4/12/2024 Na 138 K 4.5 Cr 1.1 BUN 12 glucose 171 Ca 9 GFR 82  CBC: Date: 4/12/2024 WBC 11.5 Hgb 10.2 hematocrit 31.3 platelets 512    BMP: Date: 10/25/2024 Na 137 K 4.4 Cr 1.2 BUN 14 glucose 164 Ca 9.5 GFR 74  CBC: Date: 10/25/2024 WBC 13.7 Hgb 10.5 hematocrit 33.8 platelets 295      Lab Results   Component Value Date    WBC 17.2 (H) 11/11/2020    HGB 12.8 (L) 11/11/2020    HCT 41.9 11/11/2020     11/11/2020    ALT 12 11/11/2020    AST 14 11/11/2020     11/11/2020    K 3.7 11/11/2020     11/11/2020    CREATININE 0.96 11/11/2020    BUN 14 11/11/2020    CO2 27 11/11/2020    HGBA1C 5.6 11/11/2020             /71   Pulse 68   Temp 36.2 °C (97.1 °F)   Resp 18   Ht 1.763 m (5' 9.4\")   Wt 70.7 kg (155 lb 12.8 oz)   SpO2 98%   BMI 22.74 kg/m²      Physical Exam  Vitals and nursing note reviewed.   Constitutional:       General: He is awake.      Appearance: He is underweight.      Comments: Chronically ill   HENT:      Head: Normocephalic.      Right Ear: External ear normal.      Left Ear: External ear normal.      Nose: Nose normal.      Mouth/Throat:      Mouth: Mucous membranes are moist.      Pharynx: Oropharynx is clear.   Eyes:      Extraocular Movements: Extraocular movements intact.      Conjunctiva/sclera: Conjunctivae normal.      Pupils: Pupils are equal, round, and reactive to light.   Cardiovascular:      Rate and Rhythm: Normal rate and regular rhythm.      Pulses: Normal pulses.      Heart sounds: Normal heart sounds.   Pulmonary:      Effort: Pulmonary effort is normal.      Breath sounds: Normal breath sounds.   Abdominal:      General: " Bowel sounds are normal.      Palpations: Abdomen is soft.   Musculoskeletal:         General: Normal range of motion.      Cervical back: Normal range of motion and neck supple.   Skin:     General: Skin is warm and dry.   Neurological:      Mental Status: He is alert and oriented to person, place, and time. Mental status is at baseline.      Motor: Weakness present.   Psychiatric:         Mood and Affect: Mood normal. Affect is flat.         Behavior: Behavior is slowed. Behavior is not agitated or aggressive. Behavior is cooperative.         Judgment: Judgment is not inappropriate.          Assessment/Plan       Paranoid Schizophrenia; Major Depressive Disorder; unspecified psychosis :   Continue  Fluphenazine.  Continue psychosis or agitation reported.     Type 2 Diabetes;  noncompliance with diagnostic tests:  Continue Metformin 500 mg PO BID.              Order Ha1c.              Encourage compliance with diagnostic tests.        Problem List Items Addressed This Visit       Chronic paranoid schizophrenia (Multi) - Primary    Major depressive disorder     Other Visit Diagnoses       Psychosis, unspecified psychosis type (Multi)        Type 2 diabetes mellitus without complication, without long-term current use of insulin (Multi)        Noncompliance with diagnostic testing                    Bethany Jones, APRN-CNP

## 2024-12-26 ENCOUNTER — NURSING HOME VISIT (OUTPATIENT)
Dept: POST ACUTE CARE | Facility: EXTERNAL LOCATION | Age: 64
End: 2024-12-26
Payer: MEDICAID

## 2024-12-26 DIAGNOSIS — F33.9 RECURRENT MAJOR DEPRESSIVE DISORDER, REMISSION STATUS UNSPECIFIED (CMS-HCC): ICD-10-CM

## 2024-12-26 DIAGNOSIS — E11.9 TYPE 2 DIABETES MELLITUS WITHOUT COMPLICATION, WITHOUT LONG-TERM CURRENT USE OF INSULIN (MULTI): Primary | ICD-10-CM

## 2024-12-26 DIAGNOSIS — Z91.199 NONCOMPLIANCE WITH DIAGNOSTIC TESTING: ICD-10-CM

## 2024-12-26 DIAGNOSIS — F20.0 CHRONIC PARANOID SCHIZOPHRENIA (MULTI): ICD-10-CM

## 2024-12-26 DIAGNOSIS — F29 PSYCHOSIS, UNSPECIFIED PSYCHOSIS TYPE (MULTI): ICD-10-CM

## 2024-12-26 NOTE — LETTER
Patient: Carter Jefferson  : 1960    Encounter Date: 2024    Name: Carter Jefferson    YOB: 1960    Code Status: Full Code    Chief Complaint:   Follow up on DM 2; etc....    HPI       64 year old male at [facility] in Long Term Care.  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     Diagnoses as follows:    Type 2 Diabetes;  Noncompliance with diagnostic tests:  On Metformin 500 mg PO BID.               Ha1c trends:  21 HA1c 5.6 %   10/12/21 HA1c 5.5 %   5/3/2023: HA1c 5%  2023 Ha1c 5.3 %  2023: Hemoglobin A1c 6.1%  Monitoring Ha1c q 3 months.  Recent Ha1c was not completed because patient has refused blood work the last few times it was ordered.  Discussed lab work with patient and nurses.   He states he will allow bloodwork to be performed, but when they come to draw the blood he refuses.   No hypoglycemia reported.  No complaints of chest pain, headaches or dizziness.  No nausea or vomiting.   No abdominal pain.  Patient seen today.  Calm, cooperative.  Mentation at baseline.        Major Depressive Disorder; Paranoid Schizophrenia;   unspecified psychosis :   On Fluphenazine.  No psychosis or agitation reported.   Nursing reports he is  compliant with taking his medications.  His mentation is at baseline.  No recent agitation reported by staff.  Patient generally sleeps a lot and  likes to stay in bed.                                              Reviewed  EMR  Reviewed medical, social, surgical and family history.  Reviewed all current medications and performed medication reconciliation.  Performed prescription drug management.  Reviewed vital signs AND lab results  Reviewed [facility] Documentation  Discussed patient with nursing.     ROS: 10 point ROS performed; Negative unless noted in HPI.      Surgical History  Problems    · No history of surgery    Family History  Mother    · No pertinent family history    Social History   · Current every day smoker   · Light cigarette smoker    · Lives in long-term care facility    · No illicit drug use   · Quit consuming alcohol in remote past    Allergies  NoKnown    · No Known Allergies      Outside Labs:  CBC: Date: 10/19/21, WBC: 11.6, Hgb: 12, Hct: 38.5, PLT: 310   CBC: Date: 10/12/21, WBC: 13.2, Hgb: 11.9, Hct: 38.2, PLT: 305   BMP: Date: 10/19/21, Na: 140, K: 4.2, Cl: 104, CO2: 26, BUN: 17, Cr: 1.0, Glu: 51, Ca: 9.7   BMP: Date: 10/12/21, Na: 140, K: 4.3, Cl: 103, CO2: 27, BUN: 17, Cr: 1.1, Glu: 80, Ca: 9.7   Hepatic Function Tests: Date: 10/12/21, AST: 12, ALT: 12, Alk Phos: 44, T Bili: 0.3, Albumin: 4.0   Hepatic Function Tests: Date: 10/19/21, AST: 12, ALT: 11, Alk Phos: 40, T Bili: 0.3, Albumin: 3.9   11/18/20 HA1 C 5.6 %    10/12/21 HA1c 5.5 %.     BMP: Date: 5/3/2023 Na 141 K 4.2 Cr 0.9 BUN 17 glucose 49 Ca 9.6   CBC: Date: 5/3/2023 WBC 13.3 Hgb 11.3 hematocrit 36.5 platelets 323  Liver function: Date: 5/3/2023 ALT 7 AST 11 alkaline phos.  47 bilirubin 0.4 albumin 3.8 protein 8.2  Date: 5/3/2023: Lipid panel: Cholesterol 137; triglycerides 65; HDL 31; LDL 93.  Date 5/3/2023: HA1c 5%    BMP: Date: 8/4/2023 Na 140 K 4.1 Cr 1.1 BUN 18 glucose 44 Ca 9.2 GFR 82  CBC: Date: 8/4/2023 WBC 10.6 Hgb 10.5 hematocrit 33.3 platelets 388  Liver function: Date: 8/4/2023 ALT 5 AST 11 alkaline phos.  57 bilirubin 0.3 albumin 3.7 protein  "7.9    8/4/2023 hemoglobin A1c 5.3%    8/4/2023 vitamin D 25-hydroxy level 49.22 WNL    BMP: Date: 11/7/2023 Na 140 K 4.7 Cr 1.1 BUN 18 glucose 96 Ca 9.4 GFR 82  CBC: Date: 11/7/2023 WBC 10.1 Hgb 11 hematocrit 35.4 platelets 278    11/7/2023: Hemoglobin A1c 6.1%    BMP: Date: 4/10/2024 Na 136 K 4.5 Cr 1.1 BUN 13 glucose 171 Ca 9 GFR 82  CBC: Date: 4/10/2024 WBC 17.7 Hgb 10.1 hematocrit 31.7 platelets 510    BMP: Date: 4/12/2024 Na 138 K 4.5 Cr 1.1 BUN 12 glucose 171 Ca 9 GFR 82  CBC: Date: 4/12/2024 WBC 11.5 Hgb 10.2 hematocrit 31.3 platelets 512    BMP: Date: 10/25/2024 Na 137 K 4.4 Cr 1.2 BUN 14 glucose 164 Ca 9.5 GFR 74  CBC: Date: 10/25/2024 WBC 13.7 Hgb 10.5 hematocrit 33.8 platelets 295      Lab Results   Component Value Date    WBC 17.2 (H) 11/11/2020    HGB 12.8 (L) 11/11/2020    HCT 41.9 11/11/2020     11/11/2020    ALT 12 11/11/2020    AST 14 11/11/2020     11/11/2020    K 3.7 11/11/2020     11/11/2020    CREATININE 0.96 11/11/2020    BUN 14 11/11/2020    CO2 27 11/11/2020    HGBA1C 5.6 11/11/2020             /71   Pulse 96   Temp 36.6 °C (97.8 °F)   Resp 18   Ht 1.753 m (5' 9\")   Wt 70.7 kg (155 lb 13.8 oz)   SpO2 97%   BMI 23.02 kg/m²      Physical Exam  Vitals and nursing note reviewed.   Constitutional:       General: He is awake.      Appearance: He is underweight.      Comments: Chronically ill   HENT:      Head: Normocephalic.      Right Ear: External ear normal.      Left Ear: External ear normal.      Nose: Nose normal.      Mouth/Throat:      Mouth: Mucous membranes are moist.      Pharynx: Oropharynx is clear.   Eyes:      Extraocular Movements: Extraocular movements intact.      Conjunctiva/sclera: Conjunctivae normal.      Pupils: Pupils are equal, round, and reactive to light.   Cardiovascular:      Rate and Rhythm: Normal rate and regular rhythm.      Pulses: Normal pulses.      Heart sounds: Normal heart sounds.   Pulmonary:      Effort: Pulmonary effort is " normal.      Breath sounds: Normal breath sounds.   Abdominal:      General: Bowel sounds are normal.      Palpations: Abdomen is soft.   Musculoskeletal:         General: Normal range of motion.      Cervical back: Normal range of motion and neck supple.   Skin:     General: Skin is warm and dry.   Neurological:      Mental Status: He is alert and oriented to person, place, and time. Mental status is at baseline.      Motor: Weakness present.   Psychiatric:         Mood and Affect: Mood normal. Affect is flat.         Behavior: Behavior is slowed. Behavior is not agitated or aggressive. Behavior is cooperative.         Judgment: Judgment is not inappropriate.          Assessment/Plan      Type 2 Diabetes;  noncompliance with diagnostic tests:  Continue Metformin 500 mg PO BID.              Order Ha1c.              Encourage compliance with diagnostic tests.    Major Depressive Disorder; Paranoid Schizophrenia; unspecified psychosis :   Continue  Fluphenazine.  Continue psychosis or agitation reported.       Problem List Items Addressed This Visit       Chronic paranoid schizophrenia (Multi)    Major depressive disorder     Other Visit Diagnoses       Type 2 diabetes mellitus without complication, without long-term current use of insulin (Multi)    -  Primary    Noncompliance with diagnostic testing        Psychosis, unspecified psychosis type (Multi)                      CHON Valentine       Electronically Signed By: CHON Valentine   12/28/24  5:58 PM

## 2024-12-27 ENCOUNTER — NURSING HOME VISIT (OUTPATIENT)
Dept: POST ACUTE CARE | Facility: EXTERNAL LOCATION | Age: 64
End: 2024-12-27
Payer: MEDICAID

## 2024-12-27 DIAGNOSIS — F20.89 OTHER SCHIZOPHRENIA: ICD-10-CM

## 2024-12-27 DIAGNOSIS — E11.9 TYPE 2 DIABETES MELLITUS WITHOUT COMPLICATION, WITHOUT LONG-TERM CURRENT USE OF INSULIN (MULTI): Primary | ICD-10-CM

## 2024-12-27 DIAGNOSIS — I10 PRIMARY HYPERTENSION: ICD-10-CM

## 2024-12-27 PROCEDURE — 99308 SBSQ NF CARE LOW MDM 20: CPT | Performed by: INTERNAL MEDICINE

## 2024-12-27 NOTE — LETTER
Patient: Carter Jefferson  : 1960    Encounter Date: 2024    Subjective- monthly follow up.  H/O schizophrenia, hypertension, hyperlipidemia, diabetes seen today for a routine visit .He is lying in his bed .Denies any complaints no concerns per staff.  ROS:  General-no fever  Chest-no pain  Respiratory-nocough  Abdomen-no pain or diarrhea  General: no acute distress  Pain:  Vital signs: /71  T 97.8  79 Weight 154.8 lbs  Gen- NAD,alert  Lungs: clear to auscultation B/L  Cardio: S1-S2 normal  ABD: Soft, nontender  Musc/Skel:No deformities  Extremities: No pedaledema  Neuro: No neurological deficits  Psych: Schizophrenia    Assessment/plan-  Hypertension- stable  c/w ASA  Hyperlipidemia-  Diabetes type 2- refuses labs  c/w Metformin  Schizophrenia-continue with fluphenazine  Follow-up with psych  Weakness- c/w supportive care      Electronically Signed By: Dianne Jewell MD   25 11:41 AM

## 2024-12-28 VITALS
OXYGEN SATURATION: 97 % | HEIGHT: 69 IN | TEMPERATURE: 97.8 F | DIASTOLIC BLOOD PRESSURE: 71 MMHG | WEIGHT: 155.87 LBS | HEART RATE: 96 BPM | BODY MASS INDEX: 23.09 KG/M2 | SYSTOLIC BLOOD PRESSURE: 125 MMHG | RESPIRATION RATE: 18 BRPM

## 2024-12-28 NOTE — PROGRESS NOTES
Name: Carter Jefferson    YOB: 1960    Code Status: Full Code    Chief Complaint:   Follow up on DM 2; etc....    HPI       64 year old male at [facility] in Long Term Care.  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     Diagnoses as follows:    Type 2 Diabetes;  Noncompliance with diagnostic tests:  On Metformin 500 mg PO BID.               Ha1c trends:  11/18/21 HA1c 5.6 %   10/12/21 HA1c 5.5 %   5/3/2023: HA1c 5%  8/4/2023 Ha1c 5.3 %  11/7/2023: Hemoglobin A1c 6.1%  Monitoring Ha1c q 3 months.  Recent Ha1c was not completed because patient has refused blood work the last few times it was ordered.  Discussed lab work with patient and nurses.   He states he will allow bloodwork to be performed, but when they come to draw the blood he refuses.   No hypoglycemia reported.  No complaints of chest pain, headaches or dizziness.  No nausea or vomiting.   No abdominal pain.  Patient seen today.  Calm, cooperative.  Mentation at baseline.        Major Depressive Disorder; Paranoid Schizophrenia;   unspecified psychosis :   On Fluphenazine.  No psychosis or agitation reported.   Nursing reports he is  compliant with taking his medications.  His mentation is at baseline.  No recent agitation reported by staff.  Patient generally sleeps a lot and likes to stay in bed.                                               Reviewed  EMR  Reviewed medical, social, surgical and family history.  Reviewed all current medications and performed medication reconciliation.  Performed prescription drug management.  Reviewed vital signs AND lab results  Reviewed [facility] Documentation  Discussed patient with nursing.     ROS: 10 point ROS performed; Negative unless noted in HPI.      Surgical History  Problems    · No history of surgery    Family History  Mother    · No pertinent family history    Social History   · Current every day smoker   · Light cigarette smoker    · Lives in long-term care facility    · No illicit drug use   · Quit consuming alcohol in remote past    Allergies  NoKnown    · No Known Allergies      Outside Labs:  CBC: Date: 10/19/21, WBC: 11.6, Hgb: 12, Hct: 38.5, PLT: 310   CBC: Date: 10/12/21, WBC: 13.2, Hgb: 11.9, Hct: 38.2, PLT: 305   BMP: Date: 10/19/21, Na: 140, K: 4.2, Cl: 104, CO2: 26, BUN: 17, Cr: 1.0, Glu: 51, Ca: 9.7   BMP: Date: 10/12/21, Na: 140, K: 4.3, Cl: 103, CO2: 27, BUN: 17, Cr: 1.1, Glu: 80, Ca: 9.7   Hepatic Function Tests: Date: 10/12/21, AST: 12, ALT: 12, Alk Phos: 44, T Bili: 0.3, Albumin: 4.0   Hepatic Function Tests: Date: 10/19/21, AST: 12, ALT: 11, Alk Phos: 40, T Bili: 0.3, Albumin: 3.9   11/18/20 HA1 C 5.6 %    10/12/21 HA1c 5.5 %.     BMP: Date: 5/3/2023 Na 141 K 4.2 Cr 0.9 BUN 17 glucose 49 Ca 9.6   CBC: Date: 5/3/2023 WBC 13.3 Hgb 11.3 hematocrit 36.5 platelets 323  Liver function: Date: 5/3/2023 ALT 7 AST 11 alkaline phos.  47 bilirubin 0.4 albumin 3.8 protein 8.2  Date: 5/3/2023: Lipid panel: Cholesterol 137; triglycerides 65; HDL 31; LDL 93.  Date 5/3/2023: HA1c 5%    BMP: Date: 8/4/2023 Na 140 K 4.1 Cr 1.1 BUN 18 glucose 44 Ca 9.2 GFR 82  CBC: Date: 8/4/2023 WBC 10.6 Hgb 10.5 hematocrit 33.3 platelets 388  Liver function: Date: 8/4/2023 ALT 5 AST 11 alkaline phos.  57 bilirubin 0.3 albumin 3.7 protein 7.9    8/4/2023 hemoglobin A1c 5.3%    8/4/2023 vitamin D  "25-hydroxy level 49.22 WNL    BMP: Date: 11/7/2023 Na 140 K 4.7 Cr 1.1 BUN 18 glucose 96 Ca 9.4 GFR 82  CBC: Date: 11/7/2023 WBC 10.1 Hgb 11 hematocrit 35.4 platelets 278    11/7/2023: Hemoglobin A1c 6.1%    BMP: Date: 4/10/2024 Na 136 K 4.5 Cr 1.1 BUN 13 glucose 171 Ca 9 GFR 82  CBC: Date: 4/10/2024 WBC 17.7 Hgb 10.1 hematocrit 31.7 platelets 510    BMP: Date: 4/12/2024 Na 138 K 4.5 Cr 1.1 BUN 12 glucose 171 Ca 9 GFR 82  CBC: Date: 4/12/2024 WBC 11.5 Hgb 10.2 hematocrit 31.3 platelets 512    BMP: Date: 10/25/2024 Na 137 K 4.4 Cr 1.2 BUN 14 glucose 164 Ca 9.5 GFR 74  CBC: Date: 10/25/2024 WBC 13.7 Hgb 10.5 hematocrit 33.8 platelets 295      Lab Results   Component Value Date    WBC 17.2 (H) 11/11/2020    HGB 12.8 (L) 11/11/2020    HCT 41.9 11/11/2020     11/11/2020    ALT 12 11/11/2020    AST 14 11/11/2020     11/11/2020    K 3.7 11/11/2020     11/11/2020    CREATININE 0.96 11/11/2020    BUN 14 11/11/2020    CO2 27 11/11/2020    HGBA1C 5.6 11/11/2020             /71   Pulse 96   Temp 36.6 °C (97.8 °F)   Resp 18   Ht 1.753 m (5' 9\")   Wt 70.7 kg (155 lb 13.8 oz)   SpO2 97%   BMI 23.02 kg/m²      Physical Exam  Vitals and nursing note reviewed.   Constitutional:       General: He is awake.      Appearance: He is underweight.      Comments: Chronically ill   HENT:      Head: Normocephalic.      Right Ear: External ear normal.      Left Ear: External ear normal.      Nose: Nose normal.      Mouth/Throat:      Mouth: Mucous membranes are moist.      Pharynx: Oropharynx is clear.   Eyes:      Extraocular Movements: Extraocular movements intact.      Conjunctiva/sclera: Conjunctivae normal.      Pupils: Pupils are equal, round, and reactive to light.   Cardiovascular:      Rate and Rhythm: Normal rate and regular rhythm.      Pulses: Normal pulses.      Heart sounds: Normal heart sounds.   Pulmonary:      Effort: Pulmonary effort is normal.      Breath sounds: Normal breath sounds. "   Abdominal:      General: Bowel sounds are normal.      Palpations: Abdomen is soft.   Musculoskeletal:         General: Normal range of motion.      Cervical back: Normal range of motion and neck supple.   Skin:     General: Skin is warm and dry.   Neurological:      Mental Status: He is alert and oriented to person, place, and time. Mental status is at baseline.      Motor: Weakness present.   Psychiatric:         Mood and Affect: Mood normal. Affect is flat.         Behavior: Behavior is slowed. Behavior is not agitated or aggressive. Behavior is cooperative.         Judgment: Judgment is not inappropriate.          Assessment/Plan       Type 2 Diabetes;  noncompliance with diagnostic tests:  Continue Metformin 500 mg PO BID.              Order Ha1c.              Encourage compliance with diagnostic tests.    Major Depressive Disorder; Paranoid Schizophrenia; unspecified psychosis :   Continue  Fluphenazine.  Continue psychosis or agitation reported.       Problem List Items Addressed This Visit       Chronic paranoid schizophrenia (Multi)    Major depressive disorder     Other Visit Diagnoses       Type 2 diabetes mellitus without complication, without long-term current use of insulin (Multi)    -  Primary    Noncompliance with diagnostic testing        Psychosis, unspecified psychosis type (Multi)                      GARY Valentine-CNP

## 2025-01-02 NOTE — PROGRESS NOTES
Subjective- monthly follow up.  H/O schizophrenia, hypertension, hyperlipidemia, diabetes seen today for a routine visit .He is lying in his bed .Denies any complaints no concerns per staff.  ROS:  General-no fever  Chest-no pain  Respiratory-nocough  Abdomen-no pain or diarrhea  General: no acute distress  Pain:  Vital signs: /71  T 97.8  79 Weight 154.8 lbs  Gen- NAD,alert  Lungs: clear to auscultation B/L  Cardio: S1-S2 normal  ABD: Soft, nontender  Musc/Skel:No deformities  Extremities: No pedaledema  Neuro: No neurological deficits  Psych: Schizophrenia    Assessment/plan-  Hypertension- stable  c/w ASA  Hyperlipidemia-  Diabetes type 2- refuses labs  c/w Metformin  Schizophrenia-continue with fluphenazine  Follow-up with psych  Weakness- c/w supportive care

## 2025-01-21 ENCOUNTER — NURSING HOME VISIT (OUTPATIENT)
Dept: POST ACUTE CARE | Facility: EXTERNAL LOCATION | Age: 65
End: 2025-01-21
Payer: MEDICAID

## 2025-01-21 DIAGNOSIS — F20.0 CHRONIC PARANOID SCHIZOPHRENIA (MULTI): Primary | ICD-10-CM

## 2025-01-21 DIAGNOSIS — F33.9 RECURRENT MAJOR DEPRESSIVE DISORDER, REMISSION STATUS UNSPECIFIED (CMS-HCC): ICD-10-CM

## 2025-01-21 DIAGNOSIS — F29 PSYCHOSIS, UNSPECIFIED PSYCHOSIS TYPE (MULTI): ICD-10-CM

## 2025-01-21 DIAGNOSIS — Z91.199 NONCOMPLIANCE WITH DIAGNOSTIC TESTING: ICD-10-CM

## 2025-01-21 DIAGNOSIS — E11.00 TYPE 2 DIABETES MELLITUS WITH HYPEROSMOLARITY WITHOUT COMA, WITHOUT LONG-TERM CURRENT USE OF INSULIN (MULTI): ICD-10-CM

## 2025-01-21 PROCEDURE — 99308 SBSQ NF CARE LOW MDM 20: CPT | Performed by: NURSE PRACTITIONER

## 2025-01-21 NOTE — LETTER
Patient: Carter Jefferson  : 1960    Encounter Date: 2025    Name: Carter Jefferson    YOB: 1960    Code Status: Full Code    Chief Complaint:   Follow up on paranoid schizophrenia; etc...    HPI       64 year old male at [facility] in Long Term [facility].  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     Diagnoses as follows:    Paranoid Schizophrenia; Major Depressive Disorder;  unspecified psychosis :   On Fluphenazine.  No psychosis or agitation reported.   Nursing reports he is  compliant with taking his medications.  His mentation is at baseline.  No recent agitation reported by staff.  Patient generally sleeps a lot and likes to stay in bed.               Patient seen today.  Calm, cooperative.              Mentation at baseline.    Type 2 Diabetes;  Noncompliance with diagnostic tests:  On Metformin 500 mg PO BID.               Ha1c trends:  21 HA1c 5.6 %   10/12/21 HA1c 5.5 %   5/3/2023: HA1c 5%  2023 Ha1c 5.3 %  2023: Hemoglobin A1c 6.1%  Monitoring Ha1c q 3 months.  Recent Ha1c was not completed because patient has refused blood work the last few times it was ordered.  Discussed lab work with patient and nurses.   He typically says he will allow bloodwork to be performed, but when they come to draw the blood he refuses.   No hypoglycemia reported.  No complaints of chest pain,  headaches or dizziness.  No nausea or vomiting.   No abdominal pain.                                                    Reviewed  EMR  Reviewed medical, social, surgical and family history.  Reviewed all current medications and performed medication reconciliation.  Performed prescription drug management.  Reviewed vital signs AND lab results  Reviewed [facility] Documentation  Discussed patient with nursing.     ROS: 10 point ROS performed; Negative unless noted in HPI.      Surgical History  Problems    · No history of surgery    Family History  Mother    · No pertinent family history    Social History   · Current every day smoker   · Light cigarette smoker    · Lives in long-term care facility    · No illicit drug use   · Quit consuming alcohol in remote past    Allergies  NoKnown    · No Known Allergies      Outside Labs:  CBC: Date: 10/19/21, WBC: 11.6, Hgb: 12, Hct: 38.5, PLT: 310   CBC: Date: 10/12/21, WBC: 13.2, Hgb: 11.9, Hct: 38.2, PLT: 305   BMP: Date: 10/19/21, Na: 140, K: 4.2, Cl: 104, CO2: 26, BUN: 17, Cr: 1.0, Glu: 51, Ca: 9.7   BMP: Date: 10/12/21, Na: 140, K: 4.3, Cl: 103, CO2: 27, BUN: 17, Cr: 1.1, Glu: 80, Ca: 9.7   Hepatic Function Tests: Date: 10/12/21, AST: 12, ALT: 12, Alk Phos: 44, T Bili: 0.3, Albumin: 4.0   Hepatic Function Tests: Date: 10/19/21, AST: 12, ALT: 11, Alk Phos: 40, T Bili: 0.3, Albumin: 3.9   11/18/20 HA1 C 5.6 %    10/12/21 HA1c 5.5 %.     BMP: Date: 5/3/2023 Na 141 K 4.2 Cr 0.9 BUN 17 glucose 49 Ca 9.6   CBC: Date: 5/3/2023 WBC 13.3 Hgb 11.3 hematocrit 36.5 platelets 323  Liver function: Date: 5/3/2023 ALT 7 AST 11 alkaline phos.  47 bilirubin 0.4 albumin 3.8 protein 8.2  Date: 5/3/2023: Lipid panel: Cholesterol 137; triglycerides 65; HDL 31; LDL 93.  Date 5/3/2023: HA1c 5%    BMP: Date: 8/4/2023 Na 140 K 4.1 Cr 1.1 BUN 18 glucose 44 Ca 9.2 GFR 82  CBC: Date: 8/4/2023 WBC 10.6 Hgb 10.5 hematocrit 33.3 platelets 388  Liver function: Date: 8/4/2023 ALT 5 AST 11  "alkaline phos.  57 bilirubin 0.3 albumin 3.7 protein 7.9    8/4/2023 hemoglobin A1c 5.3%    8/4/2023 vitamin D 25-hydroxy level 49.22 WNL    BMP: Date: 11/7/2023 Na 140 K 4.7 Cr 1.1 BUN 18 glucose 96 Ca 9.4 GFR 82  CBC: Date: 11/7/2023 WBC 10.1 Hgb 11 hematocrit 35.4 platelets 278    11/7/2023: Hemoglobin A1c 6.1%    BMP: Date: 4/10/2024 Na 136 K 4.5 Cr 1.1 BUN 13 glucose 171 Ca 9 GFR 82  CBC: Date: 4/10/2024 WBC 17.7 Hgb 10.1 hematocrit 31.7 platelets 510    BMP: Date: 4/12/2024 Na 138 K 4.5 Cr 1.1 BUN 12 glucose 171 Ca 9 GFR 82  CBC: Date: 4/12/2024 WBC 11.5 Hgb 10.2 hematocrit 31.3 platelets 512    BMP: Date: 10/25/2024 Na 137 K 4.4 Cr 1.2 BUN 14 glucose 164 Ca 9.5 GFR 74  CBC: Date: 10/25/2024 WBC 13.7 Hgb 10.5 hematocrit 33.8 platelets 295      Lab Results   Component Value Date    WBC 17.2 (H) 11/11/2020    HGB 12.8 (L) 11/11/2020    HCT 41.9 11/11/2020     11/11/2020    ALT 12 11/11/2020    AST 14 11/11/2020     11/11/2020    K 3.7 11/11/2020     11/11/2020    CREATININE 0.96 11/11/2020    BUN 14 11/11/2020    CO2 27 11/11/2020    HGBA1C 5.6 11/11/2020             /82   Pulse 70   Temp 36.6 °C (97.8 °F)   Resp 18   Ht 1.763 m (5' 9.4\")   Wt 70.3 kg (155 lb)   SpO2 96%   BMI 22.63 kg/m²      Physical Exam  Vitals and nursing note reviewed.   Constitutional:       General: He is awake.      Appearance: He is underweight.      Comments: Chronically ill   HENT:      Head: Normocephalic.      Right Ear: External ear normal.      Left Ear: External ear normal.      Nose: Nose normal.      Mouth/Throat:      Mouth: Mucous membranes are moist.      Pharynx: Oropharynx is clear.   Eyes:      Extraocular Movements: Extraocular movements intact.      Conjunctiva/sclera: Conjunctivae normal.      Pupils: Pupils are equal, round, and reactive to light.   Cardiovascular:      Rate and Rhythm: Normal rate and regular rhythm.      Pulses: Normal pulses.      Heart sounds: Normal heart sounds. "   Pulmonary:      Effort: Pulmonary effort is normal.      Breath sounds: Normal breath sounds.   Abdominal:      General: Bowel sounds are normal.      Palpations: Abdomen is soft.   Musculoskeletal:         General: Normal range of motion.      Cervical back: Normal range of motion and neck supple.   Skin:     General: Skin is warm and dry.   Neurological:      Mental Status: He is alert and oriented to person, place, and time. Mental status is at baseline.      Motor: Weakness present.   Psychiatric:         Mood and Affect: Mood normal. Affect is flat.         Behavior: Behavior is slowed. Behavior is not agitated or aggressive. Behavior is cooperative.         Judgment: Judgment is not inappropriate.          Assessment/Plan    Ordered CMP, CMP, Ha1c for Thursday.    Paranoid Schizophrenia; Major Depressive Disorder;  unspecified psychosis :   Continue  Fluphenazine.  Continue psychosis or agitation reported.     Type 2 Diabetes;  noncompliance with diagnostic tests:  Continue Metformin 500 mg PO BID.              Order Ha1c.              Encourage compliance with diagnostic tests.      Problem List Items Addressed This Visit       Chronic paranoid schizophrenia (Multi) - Primary    Major depressive disorder    Type 2 diabetes mellitus with hyperosmolarity without coma, without long-term current use of insulin (Multi)     Other Visit Diagnoses       Psychosis, unspecified psychosis type (Multi)        Noncompliance with diagnostic testing                        CHON Valentine       Electronically Signed By: CHON Valentine   1/26/25 11:10 PM

## 2025-01-26 VITALS
TEMPERATURE: 97.8 F | DIASTOLIC BLOOD PRESSURE: 82 MMHG | BODY MASS INDEX: 22.96 KG/M2 | SYSTOLIC BLOOD PRESSURE: 138 MMHG | OXYGEN SATURATION: 96 % | HEIGHT: 69 IN | HEART RATE: 70 BPM | RESPIRATION RATE: 18 BRPM | WEIGHT: 155 LBS

## 2025-01-27 NOTE — PROGRESS NOTES
Name: Carter Jefferson    YOB: 1960    Code Status: Full Code    Chief Complaint:   Follow up on paranoid schizophrenia; etc...    HPI       64 year old male at [facility] in Long Term Care.  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     Diagnoses as follows:    Paranoid Schizophrenia; Major Depressive Disorder;  unspecified psychosis :   On Fluphenazine.  No psychosis or agitation reported.   Nursing reports he is  compliant with taking his medications.  His mentation is at baseline.  No recent agitation reported by staff.  Patient generally sleeps a lot and likes to stay in bed.               Patient seen today.  Calm, cooperative.              Mentation at baseline.    Type 2 Diabetes;  Noncompliance with diagnostic tests:  On Metformin 500 mg PO BID.               Ha1c trends:  11/18/21 HA1c 5.6 %   10/12/21 HA1c 5.5 %   5/3/2023: HA1c 5%  8/4/2023 Ha1c 5.3 %  11/7/2023: Hemoglobin A1c 6.1%  Monitoring Ha1c q 3 months.  Recent Ha1c was not completed because patient has refused blood work the last few times it was ordered.  Discussed lab work with patient and nurses.   He typically says he will allow bloodwork to be performed, but when they come to draw the blood he refuses.   No hypoglycemia reported.  No complaints of chest pain, headaches or dizziness.  No nausea or vomiting.   No abdominal  pain.                                                    Reviewed  EMR  Reviewed medical, social, surgical and family history.  Reviewed all current medications and performed medication reconciliation.  Performed prescription drug management.  Reviewed vital signs AND lab results  Reviewed [facility] Documentation  Discussed patient with nursing.     ROS: 10 point ROS performed; Negative unless noted in HPI.      Surgical History  Problems    · No history of surgery    Family History  Mother    · No pertinent family history    Social History   · Current every day smoker   · Light cigarette smoker    · Lives in long-term care facility    · No illicit drug use   · Quit consuming alcohol in remote past    Allergies  NoKnown    · No Known Allergies      Outside Labs:  CBC: Date: 10/19/21, WBC: 11.6, Hgb: 12, Hct: 38.5, PLT: 310   CBC: Date: 10/12/21, WBC: 13.2, Hgb: 11.9, Hct: 38.2, PLT: 305   BMP: Date: 10/19/21, Na: 140, K: 4.2, Cl: 104, CO2: 26, BUN: 17, Cr: 1.0, Glu: 51, Ca: 9.7   BMP: Date: 10/12/21, Na: 140, K: 4.3, Cl: 103, CO2: 27, BUN: 17, Cr: 1.1, Glu: 80, Ca: 9.7   Hepatic Function Tests: Date: 10/12/21, AST: 12, ALT: 12, Alk Phos: 44, T Bili: 0.3, Albumin: 4.0   Hepatic Function Tests: Date: 10/19/21, AST: 12, ALT: 11, Alk Phos: 40, T Bili: 0.3, Albumin: 3.9   11/18/20 HA1 C 5.6 %    10/12/21 HA1c 5.5 %.     BMP: Date: 5/3/2023 Na 141 K 4.2 Cr 0.9 BUN 17 glucose 49 Ca 9.6   CBC: Date: 5/3/2023 WBC 13.3 Hgb 11.3 hematocrit 36.5 platelets 323  Liver function: Date: 5/3/2023 ALT 7 AST 11 alkaline phos.  47 bilirubin 0.4 albumin 3.8 protein 8.2  Date: 5/3/2023: Lipid panel: Cholesterol 137; triglycerides 65; HDL 31; LDL 93.  Date 5/3/2023: HA1c 5%    BMP: Date: 8/4/2023 Na 140 K 4.1 Cr 1.1 BUN 18 glucose 44 Ca 9.2 GFR 82  CBC: Date: 8/4/2023 WBC 10.6 Hgb 10.5 hematocrit 33.3 platelets 388  Liver function: Date: 8/4/2023 ALT 5 AST 11 alkaline phos.  57 bilirubin 0.3 albumin 3.7 protein  "7.9    8/4/2023 hemoglobin A1c 5.3%    8/4/2023 vitamin D 25-hydroxy level 49.22 WNL    BMP: Date: 11/7/2023 Na 140 K 4.7 Cr 1.1 BUN 18 glucose 96 Ca 9.4 GFR 82  CBC: Date: 11/7/2023 WBC 10.1 Hgb 11 hematocrit 35.4 platelets 278    11/7/2023: Hemoglobin A1c 6.1%    BMP: Date: 4/10/2024 Na 136 K 4.5 Cr 1.1 BUN 13 glucose 171 Ca 9 GFR 82  CBC: Date: 4/10/2024 WBC 17.7 Hgb 10.1 hematocrit 31.7 platelets 510    BMP: Date: 4/12/2024 Na 138 K 4.5 Cr 1.1 BUN 12 glucose 171 Ca 9 GFR 82  CBC: Date: 4/12/2024 WBC 11.5 Hgb 10.2 hematocrit 31.3 platelets 512    BMP: Date: 10/25/2024 Na 137 K 4.4 Cr 1.2 BUN 14 glucose 164 Ca 9.5 GFR 74  CBC: Date: 10/25/2024 WBC 13.7 Hgb 10.5 hematocrit 33.8 platelets 295      Lab Results   Component Value Date    WBC 17.2 (H) 11/11/2020    HGB 12.8 (L) 11/11/2020    HCT 41.9 11/11/2020     11/11/2020    ALT 12 11/11/2020    AST 14 11/11/2020     11/11/2020    K 3.7 11/11/2020     11/11/2020    CREATININE 0.96 11/11/2020    BUN 14 11/11/2020    CO2 27 11/11/2020    HGBA1C 5.6 11/11/2020             /82   Pulse 70   Temp 36.6 °C (97.8 °F)   Resp 18   Ht 1.763 m (5' 9.4\")   Wt 70.3 kg (155 lb)   SpO2 96%   BMI 22.63 kg/m²      Physical Exam  Vitals and nursing note reviewed.   Constitutional:       General: He is awake.      Appearance: He is underweight.      Comments: Chronically ill   HENT:      Head: Normocephalic.      Right Ear: External ear normal.      Left Ear: External ear normal.      Nose: Nose normal.      Mouth/Throat:      Mouth: Mucous membranes are moist.      Pharynx: Oropharynx is clear.   Eyes:      Extraocular Movements: Extraocular movements intact.      Conjunctiva/sclera: Conjunctivae normal.      Pupils: Pupils are equal, round, and reactive to light.   Cardiovascular:      Rate and Rhythm: Normal rate and regular rhythm.      Pulses: Normal pulses.      Heart sounds: Normal heart sounds.   Pulmonary:      Effort: Pulmonary effort is normal. "      Breath sounds: Normal breath sounds.   Abdominal:      General: Bowel sounds are normal.      Palpations: Abdomen is soft.   Musculoskeletal:         General: Normal range of motion.      Cervical back: Normal range of motion and neck supple.   Skin:     General: Skin is warm and dry.   Neurological:      Mental Status: He is alert and oriented to person, place, and time. Mental status is at baseline.      Motor: Weakness present.   Psychiatric:         Mood and Affect: Mood normal. Affect is flat.         Behavior: Behavior is slowed. Behavior is not agitated or aggressive. Behavior is cooperative.         Judgment: Judgment is not inappropriate.          Assessment/Plan     Ordered CMP, CMP, Ha1c for Thursday.    Paranoid Schizophrenia; Major Depressive Disorder;  unspecified psychosis :   Continue  Fluphenazine.  Continue psychosis or agitation reported.     Type 2 Diabetes;  noncompliance with diagnostic tests:  Continue Metformin 500 mg PO BID.              Order Ha1c.              Encourage compliance with diagnostic tests.      Problem List Items Addressed This Visit       Chronic paranoid schizophrenia (Multi) - Primary    Major depressive disorder    Type 2 diabetes mellitus with hyperosmolarity without coma, without long-term current use of insulin (Multi)     Other Visit Diagnoses       Psychosis, unspecified psychosis type (Multi)        Noncompliance with diagnostic testing                        Bethany Jones, GARY-CNP

## 2025-02-11 ENCOUNTER — NURSING HOME VISIT (OUTPATIENT)
Dept: POST ACUTE CARE | Facility: EXTERNAL LOCATION | Age: 65
End: 2025-02-11
Payer: MEDICAID

## 2025-02-11 DIAGNOSIS — R53.83 LETHARGY: ICD-10-CM

## 2025-02-11 DIAGNOSIS — E11.9 TYPE 2 DIABETES MELLITUS WITHOUT COMPLICATION, WITHOUT LONG-TERM CURRENT USE OF INSULIN (MULTI): ICD-10-CM

## 2025-02-11 DIAGNOSIS — Z91.199 NONCOMPLIANCE WITH DIAGNOSTIC TESTING: ICD-10-CM

## 2025-02-11 DIAGNOSIS — U07.1 COVID-19: Primary | ICD-10-CM

## 2025-02-11 DIAGNOSIS — F33.9 RECURRENT MAJOR DEPRESSIVE DISORDER, REMISSION STATUS UNSPECIFIED (CMS-HCC): ICD-10-CM

## 2025-02-11 DIAGNOSIS — F20.89 OTHER SCHIZOPHRENIA: ICD-10-CM

## 2025-02-11 DIAGNOSIS — F29 PSYCHOSIS, UNSPECIFIED PSYCHOSIS TYPE (MULTI): ICD-10-CM

## 2025-02-11 NOTE — LETTER
Patient: Carter Jefferson  : 1960    Encounter Date: 2025    Name: Carter Jefferson    YOB: 1960    Code Status: Full Code    Chief Complaint:   COVID-19; etc...    HPI       64 year old male at [facility] in Long Term Care.  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     Diagnoses as follows:    Covid-19; lethargy:  Nursing reports that patient tested positive for Covid-19 on 25.  Patient seen today.  No complaints or reports of cough, wheezing or SOB.  Patient is on RA.  No fevers reported.   Patient alert and awake but more lethargic than usual.  No complaints or sore throat.   Patient seen today.  He is in bed.   Calm, cooperative.  Mentation at baseline.   Patient in Covid isolation room.     Type 2 Diabetes;  Noncompliance with diagnostic tests:  On Metformin 500 mg PO BID.               Ha1c trends:  21 HA1c 5.6 %   10/12/21 HA1c 5.5 %   5/3/2023: HA1c 5%  2023 Ha1c 5.3 %  2023: Hemoglobin A1c 6.1%  Monitoring Ha1c q 3 months.  Recent Ha1c was not completed because patient has refused blood work the last few times it was ordered.  Discussed lab work with patient and nurses.   He typically says he will allow bloodwork to be performed, but when they come to draw the blood he refuses.   No hypoglycemia reported.  No complaints of chest pain, headaches or dizziness.  No nausea  or vomiting.   No abdominal pain.      Paranoid Schizophrenia; Major Depressive Disorder;  unspecified psychosis :   On Fluphenazine.  No psychosis or agitation reported.   Nursing reports he is  compliant with taking his medications.  His mentation is at baseline.  No recent agitation reported by staff.  Patient generally sleeps a lot and likes to stay in bed.                                                            Reviewed  EMR  Reviewed medical, social, surgical and family history.  Reviewed all current medications and performed medication reconciliation.  Performed prescription drug management.  Reviewed vital signs AND lab results  Reviewed [facility] Documentation  Discussed patient with nursing.     ROS: 10 point ROS performed; Negative unless noted in HPI.      Surgical History  Problems    · No history of surgery    Family History  Mother    · No pertinent family history    Social History   · Current every day smoker   · Light cigarette smoker    · Lives in long-term care facility    · No illicit drug use   · Quit consuming alcohol in remote past    Allergies  NoKnown    · No Known Allergies      Outside Labs:  CBC: Date: 10/19/21, WBC: 11.6, Hgb: 12, Hct: 38.5, PLT: 310   CBC: Date: 10/12/21, WBC: 13.2, Hgb: 11.9, Hct: 38.2, PLT: 305   BMP: Date: 10/19/21, Na: 140, K: 4.2, Cl: 104, CO2: 26, BUN: 17, Cr: 1.0, Glu: 51, Ca: 9.7   BMP: Date: 10/12/21, Na: 140, K: 4.3, Cl: 103, CO2: 27, BUN: 17, Cr: 1.1, Glu: 80, Ca: 9.7   Hepatic Function Tests: Date: 10/12/21, AST: 12, ALT: 12, Alk Phos: 44, T Bili: 0.3, Albumin: 4.0   Hepatic Function Tests: Date: 10/19/21, AST: 12, ALT: 11, Alk Phos: 40, T Bili: 0.3, Albumin: 3.9   11/18/20 HA1 C 5.6 %    10/12/21 HA1c 5.5 %.     BMP: Date: 5/3/2023 Na 141 K 4.2 Cr 0.9 BUN 17 glucose 49 Ca 9.6   CBC: Date: 5/3/2023 WBC 13.3 Hgb 11.3 hematocrit 36.5 platelets 323  Liver function: Date: 5/3/2023 ALT 7 AST 11 alkaline phos.  47 bilirubin 0.4 albumin 3.8  "protein 8.2  Date: 5/3/2023: Lipid panel: Cholesterol 137; triglycerides 65; HDL 31; LDL 93.  Date 5/3/2023: HA1c 5%    BMP: Date: 8/4/2023 Na 140 K 4.1 Cr 1.1 BUN 18 glucose 44 Ca 9.2 GFR 82  CBC: Date: 8/4/2023 WBC 10.6 Hgb 10.5 hematocrit 33.3 platelets 388  Liver function: Date: 8/4/2023 ALT 5 AST 11 alkaline phos.  57 bilirubin 0.3 albumin 3.7 protein 7.9    8/4/2023 hemoglobin A1c 5.3%    8/4/2023 vitamin D 25-hydroxy level 49.22 WNL    BMP: Date: 11/7/2023 Na 140 K 4.7 Cr 1.1 BUN 18 glucose 96 Ca 9.4 GFR 82  CBC: Date: 11/7/2023 WBC 10.1 Hgb 11 hematocrit 35.4 platelets 278    11/7/2023: Hemoglobin A1c 6.1%    BMP: Date: 4/10/2024 Na 136 K 4.5 Cr 1.1 BUN 13 glucose 171 Ca 9 GFR 82  CBC: Date: 4/10/2024 WBC 17.7 Hgb 10.1 hematocrit 31.7 platelets 510    BMP: Date: 4/12/2024 Na 138 K 4.5 Cr 1.1 BUN 12 glucose 171 Ca 9 GFR 82  CBC: Date: 4/12/2024 WBC 11.5 Hgb 10.2 hematocrit 31.3 platelets 512    BMP: Date: 10/25/2024 Na 137 K 4.4 Cr 1.2 BUN 14 glucose 164 Ca 9.5 GFR 74  CBC: Date: 10/25/2024 WBC 13.7 Hgb 10.5 hematocrit 33.8 platelets 295      Lab Results   Component Value Date    WBC 17.2 (H) 11/11/2020    HGB 12.8 (L) 11/11/2020    HCT 41.9 11/11/2020     11/11/2020    ALT 12 11/11/2020    AST 14 11/11/2020     11/11/2020    K 3.7 11/11/2020     11/11/2020    CREATININE 0.96 11/11/2020    BUN 14 11/11/2020    CO2 27 11/11/2020    HGBA1C 5.6 11/11/2020             /79   Pulse 78   Temp 36.3 °C (97.3 °F)   Resp 18   Ht 1.763 m (5' 9.4\")   Wt 69.9 kg (154 lb 3.2 oz)   SpO2 97%   BMI 22.51 kg/m²      Physical Exam  Vitals and nursing note reviewed.   Constitutional:       General: He is awake.      Appearance: He is underweight.      Comments: Chronically ill   HENT:      Head: Normocephalic.      Right Ear: External ear normal.      Left Ear: External ear normal.      Nose: Nose normal.      Mouth/Throat:      Mouth: Mucous membranes are moist.      Pharynx: Oropharynx is clear. "   Eyes:      Extraocular Movements: Extraocular movements intact.      Conjunctiva/sclera: Conjunctivae normal.      Pupils: Pupils are equal, round, and reactive to light.   Cardiovascular:      Rate and Rhythm: Normal rate and regular rhythm.      Pulses: Normal pulses.      Heart sounds: Normal heart sounds.   Pulmonary:      Effort: Pulmonary effort is normal.      Breath sounds: Normal breath sounds.   Abdominal:      General: Bowel sounds are normal.      Palpations: Abdomen is soft.   Musculoskeletal:         General: Normal range of motion.      Cervical back: Normal range of motion and neck supple.   Skin:     General: Skin is warm and dry.   Neurological:      Mental Status: He is alert and oriented to person, place, and time. Mental status is at baseline.      Motor: Weakness present.   Psychiatric:         Mood and Affect: Mood normal. Affect is flat.         Behavior: Behavior is slowed. Behavior is not agitated or aggressive. Behavior is cooperative.         Judgment: Judgment is not inappropriate.          Assessment/Plan    Ordered BMP and CBC with diff. For tomorrow.     Covid-19; lethargy:  Monitor vitals.  Ordered BMP, CBC with diff. For tomorrow.      Encourage PO fluids.      Start 240 ml PO fluids TID for 5 days.      Continue Covid isolation.       Type 2 Diabetes;  noncompliance with diagnostic tests:  Continue Metformin 500 mg PO BID.              Order Ha1c.              Encourage compliance with diagnostic tests.    Paranoid Schizophrenia; Major Depressive Disorder;  unspecified psychosis :   Continue  Fluphenazine.  Continue psychosis or agitation reported.         Problem List Items Addressed This Visit       Major depressive disorder    Schizophrenia     Other Visit Diagnoses       COVID-19    -  Primary    Lethargy        Type 2 diabetes mellitus without complication, without long-term current use of insulin (Multi)        Noncompliance with diagnostic testing        Psychosis,  unspecified psychosis type (Multi)                          CHON Valentine       Electronically Signed By: CHON Valentine   2/15/25  5:37 PM

## 2025-02-15 VITALS
TEMPERATURE: 97.3 F | WEIGHT: 154.2 LBS | DIASTOLIC BLOOD PRESSURE: 79 MMHG | RESPIRATION RATE: 18 BRPM | OXYGEN SATURATION: 97 % | HEART RATE: 78 BPM | BODY MASS INDEX: 22.84 KG/M2 | HEIGHT: 69 IN | SYSTOLIC BLOOD PRESSURE: 117 MMHG

## 2025-02-15 NOTE — PROGRESS NOTES
Name: Carter Jefferson    YOB: 1960    Code Status: Full Code    Chief Complaint:   COVID-19; etc...    HPI       64 year old male at [facility] in Long Term Care.  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     Diagnoses as follows:    Covid-19; lethargy:  Nursing reports that patient tested positive for Covid-19 on 2/6/25.  Patient seen today.  No complaints or reports of cough, wheezing or SOB.  Patient is on RA.  No fevers reported.   Patient alert and awake but more lethargic than usual.  No complaints or sore throat.   Patient seen today.  He is in bed.   Calm, cooperative.  Mentation at baseline.   Patient in Covid isolation room.     Type 2 Diabetes;  Noncompliance with diagnostic tests:  On Metformin 500 mg PO BID.               Ha1c trends:  11/18/21 HA1c 5.6 %   10/12/21 HA1c 5.5 %   5/3/2023: HA1c 5%  8/4/2023 Ha1c 5.3 %  11/7/2023: Hemoglobin A1c 6.1%  Monitoring Ha1c q 3 months.  Recent Ha1c was not completed because patient has refused blood work the last few times it was ordered.  Discussed lab work with patient and nurses.   He typically says he will allow bloodwork to be performed, but when they come to draw the blood he refuses.   No hypoglycemia reported.  No complaints of chest pain, headaches or dizziness.  No nausea or vomiting.   No abdominal pain.      Paranoid Schizophrenia; Major  Depressive Disorder;  unspecified psychosis :   On Fluphenazine.  No psychosis or agitation reported.   Nursing reports he is  compliant with taking his medications.  His mentation is at baseline.  No recent agitation reported by staff.  Patient generally sleeps a lot and likes to stay in bed.                                                            Reviewed  EMR  Reviewed medical, social, surgical and family history.  Reviewed all current medications and performed medication reconciliation.  Performed prescription drug management.  Reviewed vital signs AND lab results  Reviewed [facility] Documentation  Discussed patient with nursing.     ROS: 10 point ROS performed; Negative unless noted in HPI.      Surgical History  Problems    · No history of surgery    Family History  Mother    · No pertinent family history    Social History   · Current every day smoker   · Light cigarette smoker    · Lives in long-term care facility    · No illicit drug use   · Quit consuming alcohol in remote past    Allergies  NoKnown    · No Known Allergies      Outside Labs:  CBC: Date: 10/19/21, WBC: 11.6, Hgb: 12, Hct: 38.5, PLT: 310   CBC: Date: 10/12/21, WBC: 13.2, Hgb: 11.9, Hct: 38.2, PLT: 305   BMP: Date: 10/19/21, Na: 140, K: 4.2, Cl: 104, CO2: 26, BUN: 17, Cr: 1.0, Glu: 51, Ca: 9.7   BMP: Date: 10/12/21, Na: 140, K: 4.3, Cl: 103, CO2: 27, BUN: 17, Cr: 1.1, Glu: 80, Ca: 9.7   Hepatic Function Tests: Date: 10/12/21, AST: 12, ALT: 12, Alk Phos: 44, T Bili: 0.3, Albumin: 4.0   Hepatic Function Tests: Date: 10/19/21, AST: 12, ALT: 11, Alk Phos: 40, T Bili: 0.3, Albumin: 3.9   11/18/20 HA1 C 5.6 %    10/12/21 HA1c 5.5 %.     BMP: Date: 5/3/2023 Na 141 K 4.2 Cr 0.9 BUN 17 glucose 49 Ca 9.6   CBC: Date: 5/3/2023 WBC 13.3 Hgb 11.3 hematocrit 36.5 platelets 323  Liver function: Date: 5/3/2023 ALT 7 AST 11 alkaline phos.  47 bilirubin 0.4 albumin 3.8 protein 8.2  Date: 5/3/2023: Lipid panel: Cholesterol 137; triglycerides  "65; HDL 31; LDL 93.  Date 5/3/2023: HA1c 5%    BMP: Date: 8/4/2023 Na 140 K 4.1 Cr 1.1 BUN 18 glucose 44 Ca 9.2 GFR 82  CBC: Date: 8/4/2023 WBC 10.6 Hgb 10.5 hematocrit 33.3 platelets 388  Liver function: Date: 8/4/2023 ALT 5 AST 11 alkaline phos.  57 bilirubin 0.3 albumin 3.7 protein 7.9    8/4/2023 hemoglobin A1c 5.3%    8/4/2023 vitamin D 25-hydroxy level 49.22 WNL    BMP: Date: 11/7/2023 Na 140 K 4.7 Cr 1.1 BUN 18 glucose 96 Ca 9.4 GFR 82  CBC: Date: 11/7/2023 WBC 10.1 Hgb 11 hematocrit 35.4 platelets 278    11/7/2023: Hemoglobin A1c 6.1%    BMP: Date: 4/10/2024 Na 136 K 4.5 Cr 1.1 BUN 13 glucose 171 Ca 9 GFR 82  CBC: Date: 4/10/2024 WBC 17.7 Hgb 10.1 hematocrit 31.7 platelets 510    BMP: Date: 4/12/2024 Na 138 K 4.5 Cr 1.1 BUN 12 glucose 171 Ca 9 GFR 82  CBC: Date: 4/12/2024 WBC 11.5 Hgb 10.2 hematocrit 31.3 platelets 512    BMP: Date: 10/25/2024 Na 137 K 4.4 Cr 1.2 BUN 14 glucose 164 Ca 9.5 GFR 74  CBC: Date: 10/25/2024 WBC 13.7 Hgb 10.5 hematocrit 33.8 platelets 295      Lab Results   Component Value Date    WBC 17.2 (H) 11/11/2020    HGB 12.8 (L) 11/11/2020    HCT 41.9 11/11/2020     11/11/2020    ALT 12 11/11/2020    AST 14 11/11/2020     11/11/2020    K 3.7 11/11/2020     11/11/2020    CREATININE 0.96 11/11/2020    BUN 14 11/11/2020    CO2 27 11/11/2020    HGBA1C 5.6 11/11/2020             /79   Pulse 78   Temp 36.3 °C (97.3 °F)   Resp 18   Ht 1.763 m (5' 9.4\")   Wt 69.9 kg (154 lb 3.2 oz)   SpO2 97%   BMI 22.51 kg/m²      Physical Exam  Vitals and nursing note reviewed.   Constitutional:       General: He is awake.      Appearance: He is underweight.      Comments: Chronically ill   HENT:      Head: Normocephalic.      Right Ear: External ear normal.      Left Ear: External ear normal.      Nose: Nose normal.      Mouth/Throat:      Mouth: Mucous membranes are moist.      Pharynx: Oropharynx is clear.   Eyes:      Extraocular Movements: Extraocular movements intact.      " Conjunctiva/sclera: Conjunctivae normal.      Pupils: Pupils are equal, round, and reactive to light.   Cardiovascular:      Rate and Rhythm: Normal rate and regular rhythm.      Pulses: Normal pulses.      Heart sounds: Normal heart sounds.   Pulmonary:      Effort: Pulmonary effort is normal.      Breath sounds: Normal breath sounds.   Abdominal:      General: Bowel sounds are normal.      Palpations: Abdomen is soft.   Musculoskeletal:         General: Normal range of motion.      Cervical back: Normal range of motion and neck supple.   Skin:     General: Skin is warm and dry.   Neurological:      Mental Status: He is alert and oriented to person, place, and time. Mental status is at baseline.      Motor: Weakness present.   Psychiatric:         Mood and Affect: Mood normal. Affect is flat.         Behavior: Behavior is slowed. Behavior is not agitated or aggressive. Behavior is cooperative.         Judgment: Judgment is not inappropriate.          Assessment/Plan     Ordered BMP and CBC with diff. For tomorrow.     Covid-19; lethargy:  Monitor vitals.  Ordered BMP, CBC with diff. For tomorrow.      Encourage PO fluids.      Start 240 ml PO fluids TID for 5 days.      Continue Covid isolation.       Type 2 Diabetes;  noncompliance with diagnostic tests:  Continue Metformin 500 mg PO BID.              Order Ha1c.              Encourage compliance with diagnostic tests.    Paranoid Schizophrenia; Major Depressive Disorder;  unspecified psychosis :   Continue  Fluphenazine.  Continue psychosis or agitation reported.         Problem List Items Addressed This Visit       Major depressive disorder    Schizophrenia     Other Visit Diagnoses       COVID-19    -  Primary    Lethargy        Type 2 diabetes mellitus without complication, without long-term current use of insulin (Multi)        Noncompliance with diagnostic testing        Psychosis, unspecified psychosis type (Multi)                          Bethany Jones,  APRN-CNP

## 2025-03-06 ENCOUNTER — NURSING HOME VISIT (OUTPATIENT)
Dept: POST ACUTE CARE | Facility: EXTERNAL LOCATION | Age: 65
End: 2025-03-06
Payer: MEDICAID

## 2025-03-06 DIAGNOSIS — D72.829 LEUKOCYTOSIS, UNSPECIFIED TYPE: Primary | ICD-10-CM

## 2025-03-06 DIAGNOSIS — F29 PSYCHOSIS, UNSPECIFIED PSYCHOSIS TYPE (MULTI): ICD-10-CM

## 2025-03-06 DIAGNOSIS — Z91.199 NONCOMPLIANCE WITH DIAGNOSTIC TESTING: ICD-10-CM

## 2025-03-06 DIAGNOSIS — E11.9 TYPE 2 DIABETES MELLITUS WITHOUT COMPLICATION, WITHOUT LONG-TERM CURRENT USE OF INSULIN (MULTI): ICD-10-CM

## 2025-03-06 DIAGNOSIS — R53.83 LETHARGY: ICD-10-CM

## 2025-03-06 DIAGNOSIS — U07.1 COVID-19: ICD-10-CM

## 2025-03-06 DIAGNOSIS — F20.89 OTHER SCHIZOPHRENIA: ICD-10-CM

## 2025-03-06 DIAGNOSIS — F33.9 RECURRENT MAJOR DEPRESSIVE DISORDER, REMISSION STATUS UNSPECIFIED (CMS-HCC): ICD-10-CM

## 2025-03-06 PROCEDURE — 99309 SBSQ NF CARE MODERATE MDM 30: CPT | Performed by: NURSE PRACTITIONER

## 2025-03-06 NOTE — LETTER
Patient: Carter Jefferson  : 1960    Encounter Date: 2025    Name: Carter Jefferson    YOB: 1960    Code Status: Full Code    Chief Complaint:   Follow up on leukocytosis; etc...    HPI       64 year old male at [facility] in Long Term Care.  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     Diagnoses as follows:         Leukocytosis:        Previous elevation of WBC.       No related fevers reported.       No cough reported.       WBC trends:  2025 WBC 21.1   2025 WBC 11.3   2025 WBC 8.8   Patient seen today.  He is in bed.   Calm, cooperative.  Mentation at baseline.     Covid-19; lethargy:  Nursing reported that patient tested positive for Covid-19 on 25.  He finished with his Covid isolation.  No complaints or reports of cough, wheezing or SOB.  Patient is on RA.  No fevers reported.   No complaints or sore throat.   He was more lethargic when he had Covid.  Back to baseline now.      Paranoid Schizophrenia; Major Depressive Disorder;  unspecified psychosis :   On Fluphenazine.  No psychosis or agitation reported.   Nursing reports he is  compliant with taking his medications.  His mentation is at baseline.  No recent agitation reported by staff.  Patient generally sleeps a lot and likes to stay in bed.        Type 2 Diabetes;  Noncompliance with diagnostic tests:  On Metformin  500 mg PO BID.               Ha1c trends:  11/18/21 HA1c 5.6 %   10/12/21 HA1c 5.5 %   5/3/2023: HA1c 5%  8/4/2023 Ha1c 5.3 %  11/7/2023: Hemoglobin A1c 6.1%  1/23/2025: Ha1c 7.3%  Monitoring Ha1c q 3 months  Discussed lab work with patient and nurses.   He typically says he will allow bloodwork to be performed, but when they come to draw the blood he refuses.   No hypoglycemia reported.  No complaints of chest pain, headaches or dizziness.  No nausea or vomiting.   No abdominal pain.                                                Reviewed  EMR  Reviewed medical, social, surgical and family history.  Reviewed all current medications and performed medication reconciliation.  Performed prescription drug management.  Reviewed vital signs AND lab results  Reviewed [facility] Documentation  Discussed patient with nursing.     ROS: 10 point ROS performed; Negative unless noted in HPI.      Surgical History  Problems    · No history of surgery    Family History  Mother    · No pertinent family history    Social History   · Current every day smoker   · Light cigarette smoker    · Lives in long-term care facility    · No illicit drug use   · Quit consuming alcohol in remote past    Allergies  NoKnown    · No Known Allergies      Outside Labs:  CBC: Date: 10/19/21, WBC: 11.6, Hgb: 12, Hct: 38.5, PLT: 310   CBC: Date: 10/12/21, WBC: 13.2, Hgb: 11.9, Hct: 38.2, PLT: 305   BMP: Date: 10/19/21, Na: 140, K: 4.2, Cl: 104, CO2: 26, BUN: 17, Cr: 1.0, Glu: 51, Ca: 9.7   BMP: Date: 10/12/21, Na: 140, K: 4.3, Cl: 103, CO2: 27, BUN: 17, Cr: 1.1, Glu: 80, Ca: 9.7   Hepatic Function Tests: Date: 10/12/21, AST: 12, ALT: 12, Alk Phos: 44, T Bili: 0.3, Albumin: 4.0   Hepatic Function Tests: Date: 10/19/21, AST: 12, ALT: 11, Alk Phos: 40, T Bili: 0.3, Albumin: 3.9   11/18/20 HA1 C 5.6 %    10/12/21 HA1c 5.5 %.     BMP: Date: 5/3/2023 Na 141 K 4.2 Cr 0.9 BUN 17 glucose 49 Ca 9.6   CBC: Date: 5/3/2023 WBC 13.3 Hgb 11.3 hematocrit  36.5 platelets 323  Liver function: Date: 5/3/2023 ALT 7 AST 11 alkaline phos.  47 bilirubin 0.4 albumin 3.8 protein 8.2  Date: 5/3/2023: Lipid panel: Cholesterol 137; triglycerides 65; HDL 31; LDL 93.  Date 5/3/2023: HA1c 5%    BMP: Date: 8/4/2023 Na 140 K 4.1 Cr 1.1 BUN 18 glucose 44 Ca 9.2 GFR 82  CBC: Date: 8/4/2023 WBC 10.6 Hgb 10.5 hematocrit 33.3 platelets 388  Liver function: Date: 8/4/2023 ALT 5 AST 11 alkaline phos.  57 bilirubin 0.3 albumin 3.7 protein 7.9    8/4/2023 hemoglobin A1c 5.3%    8/4/2023 vitamin D 25-hydroxy level 49.22 WNL    BMP: Date: 11/7/2023 Na 140 K 4.7 Cr 1.1 BUN 18 glucose 96 Ca 9.4 GFR 82  CBC: Date: 11/7/2023 WBC 10.1 Hgb 11 hematocrit 35.4 platelets 278    11/7/2023: Hemoglobin A1c 6.1%    BMP: Date: 4/10/2024 Na 136 K 4.5 Cr 1.1 BUN 13 glucose 171 Ca 9 GFR 82  CBC: Date: 4/10/2024 WBC 17.7 Hgb 10.1 hematocrit 31.7 platelets 510    BMP: Date: 4/12/2024 Na 138 K 4.5 Cr 1.1 BUN 12 glucose 171 Ca 9 GFR 82  CBC: Date: 4/12/2024 WBC 11.5 Hgb 10.2 hematocrit 31.3 platelets 512    BMP: Date: 10/25/2024 Na 137 K 4.4 Cr 1.2 BUN 14 glucose 164 Ca 9.5 GFR 74  CBC: Date: 10/25/2024 WBC 13.7 Hgb 10.5 hematocrit 33.8 platelets 295    BMP: Date: 1/23/2025 Na 135 K 4 Cr 1.1 BUN 15 glucose 190 Ca 8.9 GFR 82  CBC: Date: 1/23/2025 WBC 21.1 Hgb 10.4 hematocrit 32.6 platelets 295    BMP: Date: 2/12/2025 Na 138 K 4.2 Cr 1 BUN 17 glucose 101 Ca 9 GFR 91  CBC: Date: 2/12/2025 WBC 11.3 Hgb 10.2 hematocrit 34 platelets 346    BMP: Date: 2/14/2025 Na 138 K 4.5 Cr 1 BUN 18 glucose 135 Ca 9.6 GFR 91  CBC: Date: 2/14/2025 WBC 8.8 Hgb 10.3 hematocrit 33.3 platelets 341              Lab Results   Component Value Date    WBC 17.2 (H) 11/11/2020    HGB 12.8 (L) 11/11/2020    HCT 41.9 11/11/2020     11/11/2020    ALT 12 11/11/2020    AST 14 11/11/2020     11/11/2020    K 3.7 11/11/2020     11/11/2020    CREATININE 0.96 11/11/2020    BUN 14 11/11/2020    CO2 27 11/11/2020    HGBA1C 5.6 11/11/2020  "            /68   Pulse 71   Temp 36.9 °C (98.4 °F)   Resp 18   Ht 1.763 m (5' 9.4\")   Wt 69.9 kg (154 lb 3.2 oz)   SpO2 98%   BMI 22.51 kg/m²      Physical Exam  Vitals and nursing note reviewed.   Constitutional:       General: He is awake.      Appearance: He is underweight.      Comments: Chronically ill   HENT:      Head: Normocephalic.      Right Ear: External ear normal.      Left Ear: External ear normal.      Nose: Nose normal.      Mouth/Throat:      Mouth: Mucous membranes are moist.      Pharynx: Oropharynx is clear.   Eyes:      Extraocular Movements: Extraocular movements intact.      Conjunctiva/sclera: Conjunctivae normal.      Pupils: Pupils are equal, round, and reactive to light.   Cardiovascular:      Rate and Rhythm: Normal rate and regular rhythm.      Pulses: Normal pulses.      Heart sounds: Normal heart sounds.   Pulmonary:      Effort: Pulmonary effort is normal.      Breath sounds: Normal breath sounds.   Abdominal:      General: Bowel sounds are normal.      Palpations: Abdomen is soft.   Musculoskeletal:         General: Normal range of motion.      Cervical back: Normal range of motion and neck supple.   Skin:     General: Skin is warm and dry.   Neurological:      Mental Status: He is alert and oriented to person, place, and time. Mental status is at baseline.      Motor: Weakness present.   Psychiatric:         Mood and Affect: Mood normal. Affect is flat.         Behavior: Behavior is slowed. Behavior is not agitated or aggressive. Behavior is cooperative.         Judgment: Judgment is not inappropriate.          Assessment/Plan       Leukocytosis:        Previous elevation of WBC.         Monitor WBC.         Monitor for s/s of infection.         Covid-19; lethargy:       Patient finished with covid isolation.       Monitor for cough.    Paranoid Schizophrenia; Major Depressive Disorder;  unspecified psychosis :   Continue  Fluphenazine.  Continue psychosis or agitation " reported.     Type 2 Diabetes;  noncompliance with diagnostic tests:  Continue Metformin 500 mg PO BID.              Monitor Ha1c.              Encourage compliance with diagnostic tests.      Problem List Items Addressed This Visit       Major depressive disorder    Schizophrenia     Other Visit Diagnoses       Leukocytosis, unspecified type    -  Primary    COVID-19        Lethargy        Psychosis, unspecified psychosis type (Multi)        Type 2 diabetes mellitus without complication, without long-term current use of insulin (Multi)        Noncompliance with diagnostic testing                            CHON Valentine       Electronically Signed By: CHON Valentine   3/11/25 12:01 AM

## 2025-03-07 ENCOUNTER — NURSING HOME VISIT (OUTPATIENT)
Dept: POST ACUTE CARE | Facility: EXTERNAL LOCATION | Age: 65
End: 2025-03-07
Payer: MEDICAID

## 2025-03-07 DIAGNOSIS — I10 PRIMARY HYPERTENSION: ICD-10-CM

## 2025-03-07 DIAGNOSIS — F41.9 ANXIETY: Primary | ICD-10-CM

## 2025-03-07 DIAGNOSIS — F20.0 CHRONIC PARANOID SCHIZOPHRENIA (MULTI): ICD-10-CM

## 2025-03-07 DIAGNOSIS — E11.00 TYPE 2 DIABETES MELLITUS WITH HYPEROSMOLARITY WITHOUT COMA, WITHOUT LONG-TERM CURRENT USE OF INSULIN (MULTI): ICD-10-CM

## 2025-03-07 PROCEDURE — 99308 SBSQ NF CARE LOW MDM 20: CPT | Performed by: INTERNAL MEDICINE

## 2025-03-07 NOTE — LETTER
Patient: Carter Jefferson  : 1960    Encounter Date: 2025    : Subjective- monthly follow up.  H/O schizophrenia, hypertension, hyperlipidemia, diabetes seen today for a routine visit .He is lying in his bed .Denies any complaints no concerns per staff.  ROS:  General-no fever  Chest-no pain  Respiratory-nocough  Abdomen-no pain or diarrhea  General: no acute distress  Pain:  Vital signs: /68  T 98.4  71 Weight 154.2 lbs  Gen- NAD,alert  Lungs: clear to auscultation B/L  Cardio: S1-S2 normal  ABD: Soft, nontender  Musc/Skel:No deformities  Extremities: No pedaledema  Neuro: No neurological deficits  Psych: Schizophrenia  Assessment/plan-  Hypertension- stable  c/w ASA  Hyperlipidemia-  Diabetes type 2-  c/w Metformin  Schizophrenia-continue with fluphenazine  Follow-up with psych  Weakness- c/w supportive care      Electronically Signed By: Dianne Jewell MD   3/12/25  9:06 PM

## 2025-03-10 VITALS
BODY MASS INDEX: 22.84 KG/M2 | HEART RATE: 71 BPM | TEMPERATURE: 98.4 F | OXYGEN SATURATION: 98 % | HEIGHT: 69 IN | DIASTOLIC BLOOD PRESSURE: 68 MMHG | RESPIRATION RATE: 18 BRPM | SYSTOLIC BLOOD PRESSURE: 104 MMHG | WEIGHT: 154.2 LBS

## 2025-03-11 NOTE — PROGRESS NOTES
Name: Carter Jefferson    YOB: 1960    Code Status: Full Code    Chief Complaint:   Follow up on leukocytosis; etc...    HPI       64 year old male at [facility] in Long Term Care.  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     Diagnoses as follows:         Leukocytosis:        Previous elevation of WBC.       No related fevers reported.       No cough reported.       WBC trends:  1/23/2025 WBC 21.1   2/12/2025 WBC 11.3   2/14/2025 WBC 8.8   Patient seen today.  He is in bed.   Calm, cooperative.  Mentation at baseline.     Covid-19; lethargy:  Nursing reported that patient tested positive for Covid-19 on 2/6/25.  He finished with his Covid isolation.  No complaints or reports of cough, wheezing or SOB.  Patient is on RA.  No fevers reported.   No complaints or sore throat.   He was more lethargic when he had Covid.  Back to baseline now.      Paranoid Schizophrenia; Major Depressive Disorder;  unspecified psychosis :   On Fluphenazine.  No psychosis or agitation reported.   Nursing reports he is  compliant with taking his medications.  His mentation is at baseline.  No recent agitation reported by staff.  Patient generally sleeps a lot and likes to stay in bed.        Type 2 Diabetes;  Noncompliance with diagnostic tests:  On Metformin 500 mg PO BID.               Ha1c trends:  11/18/21 HA1c 5.6 %   10/12/21  HA1c 5.5 %   5/3/2023: HA1c 5%  8/4/2023 Ha1c 5.3 %  11/7/2023: Hemoglobin A1c 6.1%  1/23/2025: Ha1c 7.3%  Monitoring Ha1c q 3 months  Discussed lab work with patient and nurses.   He typically says he will allow bloodwork to be performed, but when they come to draw the blood he refuses.   No hypoglycemia reported.  No complaints of chest pain, headaches or dizziness.  No nausea or vomiting.   No abdominal pain.                                                Reviewed  EMR  Reviewed medical, social, surgical and family history.  Reviewed all current medications and performed medication reconciliation.  Performed prescription drug management.  Reviewed vital signs AND lab results  Reviewed [facility] Documentation  Discussed patient with nursing.     ROS: 10 point ROS performed; Negative unless noted in HPI.      Surgical History  Problems    · No history of surgery    Family History  Mother    · No pertinent family history    Social History   · Current every day smoker   · Light cigarette smoker    · Lives in long-term care facility    · No illicit drug use   · Quit consuming alcohol in remote past    Allergies  NoKnown    · No Known Allergies      Outside Labs:  CBC: Date: 10/19/21, WBC: 11.6, Hgb: 12, Hct: 38.5, PLT: 310   CBC: Date: 10/12/21, WBC: 13.2, Hgb: 11.9, Hct: 38.2, PLT: 305   BMP: Date: 10/19/21, Na: 140, K: 4.2, Cl: 104, CO2: 26, BUN: 17, Cr: 1.0, Glu: 51, Ca: 9.7   BMP: Date: 10/12/21, Na: 140, K: 4.3, Cl: 103, CO2: 27, BUN: 17, Cr: 1.1, Glu: 80, Ca: 9.7   Hepatic Function Tests: Date: 10/12/21, AST: 12, ALT: 12, Alk Phos: 44, T Bili: 0.3, Albumin: 4.0   Hepatic Function Tests: Date: 10/19/21, AST: 12, ALT: 11, Alk Phos: 40, T Bili: 0.3, Albumin: 3.9   11/18/20 HA1 C 5.6 %    10/12/21 HA1c 5.5 %.     BMP: Date: 5/3/2023 Na 141 K 4.2 Cr 0.9 BUN 17 glucose 49 Ca 9.6   CBC: Date: 5/3/2023 WBC 13.3 Hgb 11.3 hematocrit 36.5 platelets 323  Liver function: Date: 5/3/2023 ALT 7 AST 11 alkaline  phos.  47 bilirubin 0.4 albumin 3.8 protein 8.2  Date: 5/3/2023: Lipid panel: Cholesterol 137; triglycerides 65; HDL 31; LDL 93.  Date 5/3/2023: HA1c 5%    BMP: Date: 8/4/2023 Na 140 K 4.1 Cr 1.1 BUN 18 glucose 44 Ca 9.2 GFR 82  CBC: Date: 8/4/2023 WBC 10.6 Hgb 10.5 hematocrit 33.3 platelets 388  Liver function: Date: 8/4/2023 ALT 5 AST 11 alkaline phos.  57 bilirubin 0.3 albumin 3.7 protein 7.9    8/4/2023 hemoglobin A1c 5.3%    8/4/2023 vitamin D 25-hydroxy level 49.22 WNL    BMP: Date: 11/7/2023 Na 140 K 4.7 Cr 1.1 BUN 18 glucose 96 Ca 9.4 GFR 82  CBC: Date: 11/7/2023 WBC 10.1 Hgb 11 hematocrit 35.4 platelets 278    11/7/2023: Hemoglobin A1c 6.1%    BMP: Date: 4/10/2024 Na 136 K 4.5 Cr 1.1 BUN 13 glucose 171 Ca 9 GFR 82  CBC: Date: 4/10/2024 WBC 17.7 Hgb 10.1 hematocrit 31.7 platelets 510    BMP: Date: 4/12/2024 Na 138 K 4.5 Cr 1.1 BUN 12 glucose 171 Ca 9 GFR 82  CBC: Date: 4/12/2024 WBC 11.5 Hgb 10.2 hematocrit 31.3 platelets 512    BMP: Date: 10/25/2024 Na 137 K 4.4 Cr 1.2 BUN 14 glucose 164 Ca 9.5 GFR 74  CBC: Date: 10/25/2024 WBC 13.7 Hgb 10.5 hematocrit 33.8 platelets 295    BMP: Date: 1/23/2025 Na 135 K 4 Cr 1.1 BUN 15 glucose 190 Ca 8.9 GFR 82  CBC: Date: 1/23/2025 WBC 21.1 Hgb 10.4 hematocrit 32.6 platelets 295    BMP: Date: 2/12/2025 Na 138 K 4.2 Cr 1 BUN 17 glucose 101 Ca 9 GFR 91  CBC: Date: 2/12/2025 WBC 11.3 Hgb 10.2 hematocrit 34 platelets 346    BMP: Date: 2/14/2025 Na 138 K 4.5 Cr 1 BUN 18 glucose 135 Ca 9.6 GFR 91  CBC: Date: 2/14/2025 WBC 8.8 Hgb 10.3 hematocrit 33.3 platelets 341              Lab Results   Component Value Date    WBC 17.2 (H) 11/11/2020    HGB 12.8 (L) 11/11/2020    HCT 41.9 11/11/2020     11/11/2020    ALT 12 11/11/2020    AST 14 11/11/2020     11/11/2020    K 3.7 11/11/2020     11/11/2020    CREATININE 0.96 11/11/2020    BUN 14 11/11/2020    CO2 27 11/11/2020    HGBA1C 5.6 11/11/2020             /68   Pulse 71   Temp 36.9 °C (98.4 °F)   Resp 18    "Ht 1.763 m (5' 9.4\")   Wt 69.9 kg (154 lb 3.2 oz)   SpO2 98%   BMI 22.51 kg/m²      Physical Exam  Vitals and nursing note reviewed.   Constitutional:       General: He is awake.      Appearance: He is underweight.      Comments: Chronically ill   HENT:      Head: Normocephalic.      Right Ear: External ear normal.      Left Ear: External ear normal.      Nose: Nose normal.      Mouth/Throat:      Mouth: Mucous membranes are moist.      Pharynx: Oropharynx is clear.   Eyes:      Extraocular Movements: Extraocular movements intact.      Conjunctiva/sclera: Conjunctivae normal.      Pupils: Pupils are equal, round, and reactive to light.   Cardiovascular:      Rate and Rhythm: Normal rate and regular rhythm.      Pulses: Normal pulses.      Heart sounds: Normal heart sounds.   Pulmonary:      Effort: Pulmonary effort is normal.      Breath sounds: Normal breath sounds.   Abdominal:      General: Bowel sounds are normal.      Palpations: Abdomen is soft.   Musculoskeletal:         General: Normal range of motion.      Cervical back: Normal range of motion and neck supple.   Skin:     General: Skin is warm and dry.   Neurological:      Mental Status: He is alert and oriented to person, place, and time. Mental status is at baseline.      Motor: Weakness present.   Psychiatric:         Mood and Affect: Mood normal. Affect is flat.         Behavior: Behavior is slowed. Behavior is not agitated or aggressive. Behavior is cooperative.         Judgment: Judgment is not inappropriate.          Assessment/Plan        Leukocytosis:        Previous elevation of WBC.         Monitor WBC.         Monitor for s/s of infection.         Covid-19; lethargy:       Patient finished with covid isolation.       Monitor for cough.    Paranoid Schizophrenia; Major Depressive Disorder;  unspecified psychosis :   Continue  Fluphenazine.  Continue psychosis or agitation reported.     Type 2 Diabetes;  noncompliance with diagnostic " tests:  Continue Metformin 500 mg PO BID.              Monitor Ha1c.              Encourage compliance with diagnostic tests.      Problem List Items Addressed This Visit       Major depressive disorder    Schizophrenia     Other Visit Diagnoses       Leukocytosis, unspecified type    -  Primary    COVID-19        Lethargy        Psychosis, unspecified psychosis type (Multi)        Type 2 diabetes mellitus without complication, without long-term current use of insulin (Multi)        Noncompliance with diagnostic testing                            Bethany Jones, APRN-CNP

## 2025-03-13 NOTE — PROGRESS NOTES
: Subjective- monthly follow up.  H/O schizophrenia, hypertension, hyperlipidemia, diabetes seen today for a routine visit .He is lying in his bed .Denies any complaints no concerns per staff.  ROS:  General-no fever  Chest-no pain  Respiratory-nocough  Abdomen-no pain or diarrhea  General: no acute distress  Pain:  Vital signs: /68  T 98.4  71 Weight 154.2 lbs  Gen- NAD,alert  Lungs: clear to auscultation B/L  Cardio: S1-S2 normal  ABD: Soft, nontender  Musc/Skel:No deformities  Extremities: No pedaledema  Neuro: No neurological deficits  Psych: Schizophrenia  Assessment/plan-  Hypertension- stable  c/w ASA  Hyperlipidemia-  Diabetes type 2-  c/w Metformin  Schizophrenia-continue with fluphenazine  Follow-up with psych  Weakness- c/w supportive care

## 2025-04-01 ENCOUNTER — NURSING HOME VISIT (OUTPATIENT)
Dept: POST ACUTE CARE | Facility: EXTERNAL LOCATION | Age: 65
End: 2025-04-01
Payer: MEDICAID

## 2025-04-01 DIAGNOSIS — F33.9 RECURRENT MAJOR DEPRESSIVE DISORDER, REMISSION STATUS UNSPECIFIED: ICD-10-CM

## 2025-04-01 DIAGNOSIS — F20.0 CHRONIC PARANOID SCHIZOPHRENIA (MULTI): ICD-10-CM

## 2025-04-01 DIAGNOSIS — E11.00 TYPE 2 DIABETES MELLITUS WITH HYPEROSMOLARITY WITHOUT COMA, WITHOUT LONG-TERM CURRENT USE OF INSULIN (MULTI): Primary | ICD-10-CM

## 2025-04-01 DIAGNOSIS — Z91.199 NONCOMPLIANCE WITH DIAGNOSTIC TESTING: ICD-10-CM

## 2025-04-01 DIAGNOSIS — F29 PSYCHOSIS, UNSPECIFIED PSYCHOSIS TYPE (MULTI): ICD-10-CM

## 2025-04-01 PROCEDURE — 99308 SBSQ NF CARE LOW MDM 20: CPT | Performed by: NURSE PRACTITIONER

## 2025-04-01 NOTE — LETTER
Patient: Carter Jefferson  : 1960    Encounter Date: 2025    Name: Carter Jefferson    YOB: 1960    Code Status: Full Code    Chief Complaint:   Follow up on DM 2; etc...    HPI       65 year old male at [facility] in Long Term Care.  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     Diagnoses as follows:    Type 2 Diabetes;  Noncompliance with diagnostic tests:  On Metformin 500 mg PO BID.               Ha1c trends:  21 HA1c 5.6 %   10/12/21 HA1c 5.5 %   5/3/2023: HA1c 5%  2023 Ha1c 5.3 %  2023: Hemoglobin A1c 6.1%  2025: Ha1c 7.3%  Monitoring Ha1c q 3 months  Discussed lab work with patient and nurses.   He typically says he will allow bloodwork to be performed, but when they come to draw the blood he refuses.   No hypoglycemia reported.  Patient seen today.   No complaints of chest pain, headaches or dizziness.  No nausea or vomiting.   No abdominal pain.  Calm, cooperative.  Follows commands.      Major Depressive Disorder; Paranoid Schizophrenia;  unspecified psychosis :   On Fluphenazine.  No psychosis or agitation reported.   Nursing reports he is  compliant with taking his medications.  His mentation is at baseline.  No recent agitation reported by staff.  Patient generally sleeps a lot and likes to stay in bed.                                                    Reviewed [facility]  EMR  Reviewed medical, social, surgical and family history.  Reviewed all current medications and performed medication reconciliation.  Performed prescription drug management.  Reviewed vital signs AND lab results  Reviewed [facility] Documentation  Discussed patient with nursing.     ROS: 10 point ROS performed; Negative unless noted in HPI.      Surgical History  Problems    · No history of surgery    Family History  Mother    · No pertinent family history    Social History   · Current every day smoker   · Light cigarette smoker    · Lives in long-term care facility    · No illicit drug use   · Quit consuming alcohol in remote past    Allergies  NoKnown    · No Known Allergies      Outside Labs:  CBC: Date: 10/19/21, WBC: 11.6, Hgb: 12, Hct: 38.5, PLT: 310   CBC: Date: 10/12/21, WBC: 13.2, Hgb: 11.9, Hct: 38.2, PLT: 305   BMP: Date: 10/19/21, Na: 140, K: 4.2, Cl: 104, CO2: 26, BUN: 17, Cr: 1.0, Glu: 51, Ca: 9.7   BMP: Date: 10/12/21, Na: 140, K: 4.3, Cl: 103, CO2: 27, BUN: 17, Cr: 1.1, Glu: 80, Ca: 9.7   Hepatic Function Tests: Date: 10/12/21, AST: 12, ALT: 12, Alk Phos: 44, T Bili: 0.3, Albumin: 4.0   Hepatic Function Tests: Date: 10/19/21, AST: 12, ALT: 11, Alk Phos: 40, T Bili: 0.3, Albumin: 3.9   11/18/20 HA1 C 5.6 %    10/12/21 HA1c 5.5 %.     BMP: Date: 5/3/2023 Na 141 K 4.2 Cr 0.9 BUN 17 glucose 49 Ca 9.6   CBC: Date: 5/3/2023 WBC 13.3 Hgb 11.3 hematocrit 36.5 platelets 323  Liver function: Date: 5/3/2023 ALT 7 AST 11 alkaline phos.  47 bilirubin 0.4 albumin 3.8 protein 8.2  Date: 5/3/2023: Lipid panel: Cholesterol 137; triglycerides 65; HDL 31; LDL 93.  Date 5/3/2023: HA1c 5%    BMP: Date: 8/4/2023 Na 140 K 4.1 Cr 1.1 BUN 18 glucose 44 Ca 9.2 GFR 82  CBC: Date: 8/4/2023 WBC 10.6 Hgb 10.5 hematocrit 33.3 platelets 388  Liver function: Date: 8/4/2023 ALT 5 AST 11 alkaline phos.  57 bilirubin 0.3 albumin 3.7 protein 7.9    8/4/2023 hemoglobin A1c 5.3%    8/4/2023 vitamin D 25-hydroxy level 49.22  "WNL    BMP: Date: 11/7/2023 Na 140 K 4.7 Cr 1.1 BUN 18 glucose 96 Ca 9.4 GFR 82  CBC: Date: 11/7/2023 WBC 10.1 Hgb 11 hematocrit 35.4 platelets 278    11/7/2023: Hemoglobin A1c 6.1%    BMP: Date: 4/10/2024 Na 136 K 4.5 Cr 1.1 BUN 13 glucose 171 Ca 9 GFR 82  CBC: Date: 4/10/2024 WBC 17.7 Hgb 10.1 hematocrit 31.7 platelets 510    BMP: Date: 4/12/2024 Na 138 K 4.5 Cr 1.1 BUN 12 glucose 171 Ca 9 GFR 82  CBC: Date: 4/12/2024 WBC 11.5 Hgb 10.2 hematocrit 31.3 platelets 512    BMP: Date: 10/25/2024 Na 137 K 4.4 Cr 1.2 BUN 14 glucose 164 Ca 9.5 GFR 74  CBC: Date: 10/25/2024 WBC 13.7 Hgb 10.5 hematocrit 33.8 platelets 295    BMP: Date: 1/23/2025 Na 135 K 4 Cr 1.1 BUN 15 glucose 190 Ca 8.9 GFR 82  CBC: Date: 1/23/2025 WBC 21.1 Hgb 10.4 hematocrit 32.6 platelets 295    BMP: Date: 2/12/2025 Na 138 K 4.2 Cr 1 BUN 17 glucose 101 Ca 9 GFR 91  CBC: Date: 2/12/2025 WBC 11.3 Hgb 10.2 hematocrit 34 platelets 346    BMP: Date: 2/14/2025 Na 138 K 4.5 Cr 1 BUN 18 glucose 135 Ca 9.6 GFR 91  CBC: Date: 2/14/2025 WBC 8.8 Hgb 10.3 hematocrit 33.3 platelets 341              Lab Results   Component Value Date    WBC 17.2 (H) 11/11/2020    HGB 12.8 (L) 11/11/2020    HCT 41.9 11/11/2020     11/11/2020    ALT 12 11/11/2020    AST 14 11/11/2020     11/11/2020    K 3.7 11/11/2020     11/11/2020    CREATININE 0.96 11/11/2020    BUN 14 11/11/2020    CO2 27 11/11/2020    HGBA1C 5.6 11/11/2020             /90   Pulse 70   Temp 36.6 °C (97.8 °F)   Resp 18   Ht 1.763 m (5' 9.4\")   Wt 69.9 kg (154 lb 3.2 oz)   SpO2 99%   BMI 22.51 kg/m²      Physical Exam  Vitals and nursing note reviewed.   Constitutional:       General: He is awake.      Appearance: He is underweight.      Comments: Chronically ill   HENT:      Head: Normocephalic.      Right Ear: External ear normal.      Left Ear: External ear normal.      Nose: Nose normal.      Mouth/Throat:      Mouth: Mucous membranes are moist.      Pharynx: Oropharynx is clear. "   Eyes:      Extraocular Movements: Extraocular movements intact.      Conjunctiva/sclera: Conjunctivae normal.      Pupils: Pupils are equal, round, and reactive to light.   Cardiovascular:      Rate and Rhythm: Normal rate and regular rhythm.      Pulses: Normal pulses.      Heart sounds: Normal heart sounds.   Pulmonary:      Effort: Pulmonary effort is normal.      Breath sounds: Normal breath sounds.   Abdominal:      General: Bowel sounds are normal.      Palpations: Abdomen is soft.   Musculoskeletal:         General: Normal range of motion.      Cervical back: Normal range of motion and neck supple.   Skin:     General: Skin is warm and dry.   Neurological:      Mental Status: He is alert and oriented to person, place, and time. Mental status is at baseline.      Motor: Weakness present.   Psychiatric:         Mood and Affect: Mood normal. Affect is flat.         Behavior: Behavior is slowed. Behavior is not agitated or aggressive. Behavior is cooperative.         Judgment: Judgment is not inappropriate.          Assessment/Plan    Type 2 Diabetes;  noncompliance with diagnostic tests:  Continue Metformin 500 mg PO BID.              Monitor Ha1c.              Encourage compliance with diagnostic tests.                 Major Depressive Disorder; Paranoid Schizophrenia;  unspecified psychosis :   Continue  Fluphenazine.  Continue psychosis or agitation reported.       Problem List Items Addressed This Visit       Chronic paranoid schizophrenia (Multi)    Major depressive disorder    Type 2 diabetes mellitus with hyperosmolarity without coma, without long-term current use of insulin (Multi) - Primary     Other Visit Diagnoses       Noncompliance with diagnostic testing        Psychosis, unspecified psychosis type (Multi)                              CHON Valentine       Electronically Signed By: CHON Valentine   4/7/25  1:17 AM

## 2025-04-07 VITALS
HEART RATE: 70 BPM | OXYGEN SATURATION: 99 % | SYSTOLIC BLOOD PRESSURE: 115 MMHG | BODY MASS INDEX: 22.84 KG/M2 | RESPIRATION RATE: 18 BRPM | TEMPERATURE: 97.8 F | DIASTOLIC BLOOD PRESSURE: 90 MMHG | HEIGHT: 69 IN | WEIGHT: 154.2 LBS

## 2025-04-07 NOTE — PROGRESS NOTES
Name: Carter Jefferson    YOB: 1960    Code Status: Full Code    Chief Complaint:   Follow up on DM 2; etc...    HPI       65 year old male at [facility] in Long Term Care.  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     Diagnoses as follows:    Type 2 Diabetes;  Noncompliance with diagnostic tests:  On Metformin 500 mg PO BID.               Ha1c trends:  11/18/21 HA1c 5.6 %   10/12/21 HA1c 5.5 %   5/3/2023: HA1c 5%  8/4/2023 Ha1c 5.3 %  11/7/2023: Hemoglobin A1c 6.1%  1/23/2025: Ha1c 7.3%  Monitoring Ha1c q 3 months  Discussed lab work with patient and nurses.   He typically says he will allow bloodwork to be performed, but when they come to draw the blood he refuses.   No hypoglycemia reported.  Patient seen today.   No complaints of chest pain, headaches or dizziness.  No nausea or vomiting.   No abdominal pain.  Calm, cooperative.  Follows commands.      Major Depressive Disorder; Paranoid Schizophrenia;  unspecified psychosis :   On Fluphenazine.  No psychosis or agitation reported.   Nursing reports he is  compliant with taking his medications.  His mentation is at baseline.  No recent agitation reported by staff.  Patient generally sleeps a lot and likes to stay in bed.                                                    Reviewed  EMR  Reviewed medical, social, surgical and family history.  Reviewed all  current medications and performed medication reconciliation.  Performed prescription drug management.  Reviewed vital signs AND lab results  Reviewed [facility] Documentation  Discussed patient with nursing.     ROS: 10 point ROS performed; Negative unless noted in HPI.      Surgical History  Problems    · No history of surgery    Family History  Mother    · No pertinent family history    Social History   · Current every day smoker   · Light cigarette smoker    · Lives in long-term care facility    · No illicit drug use   · Quit consuming alcohol in remote past    Allergies  NoKnown    · No Known Allergies      Outside Labs:  CBC: Date: 10/19/21, WBC: 11.6, Hgb: 12, Hct: 38.5, PLT: 310   CBC: Date: 10/12/21, WBC: 13.2, Hgb: 11.9, Hct: 38.2, PLT: 305   BMP: Date: 10/19/21, Na: 140, K: 4.2, Cl: 104, CO2: 26, BUN: 17, Cr: 1.0, Glu: 51, Ca: 9.7   BMP: Date: 10/12/21, Na: 140, K: 4.3, Cl: 103, CO2: 27, BUN: 17, Cr: 1.1, Glu: 80, Ca: 9.7   Hepatic Function Tests: Date: 10/12/21, AST: 12, ALT: 12, Alk Phos: 44, T Bili: 0.3, Albumin: 4.0   Hepatic Function Tests: Date: 10/19/21, AST: 12, ALT: 11, Alk Phos: 40, T Bili: 0.3, Albumin: 3.9   11/18/20 HA1 C 5.6 %    10/12/21 HA1c 5.5 %.     BMP: Date: 5/3/2023 Na 141 K 4.2 Cr 0.9 BUN 17 glucose 49 Ca 9.6   CBC: Date: 5/3/2023 WBC 13.3 Hgb 11.3 hematocrit 36.5 platelets 323  Liver function: Date: 5/3/2023 ALT 7 AST 11 alkaline phos.  47 bilirubin 0.4 albumin 3.8 protein 8.2  Date: 5/3/2023: Lipid panel: Cholesterol 137; triglycerides 65; HDL 31; LDL 93.  Date 5/3/2023: HA1c 5%    BMP: Date: 8/4/2023 Na 140 K 4.1 Cr 1.1 BUN 18 glucose 44 Ca 9.2 GFR 82  CBC: Date: 8/4/2023 WBC 10.6 Hgb 10.5 hematocrit 33.3 platelets 388  Liver function: Date: 8/4/2023 ALT 5 AST 11 alkaline phos.  57 bilirubin 0.3 albumin 3.7 protein 7.9    8/4/2023 hemoglobin A1c 5.3%    8/4/2023 vitamin D 25-hydroxy level 49.22 WNL    BMP: Date: 11/7/2023 Na 140 K 4.7 Cr 1.1 BUN 18 glucose 96 Ca 9.4  "GFR 82  CBC: Date: 11/7/2023 WBC 10.1 Hgb 11 hematocrit 35.4 platelets 278    11/7/2023: Hemoglobin A1c 6.1%    BMP: Date: 4/10/2024 Na 136 K 4.5 Cr 1.1 BUN 13 glucose 171 Ca 9 GFR 82  CBC: Date: 4/10/2024 WBC 17.7 Hgb 10.1 hematocrit 31.7 platelets 510    BMP: Date: 4/12/2024 Na 138 K 4.5 Cr 1.1 BUN 12 glucose 171 Ca 9 GFR 82  CBC: Date: 4/12/2024 WBC 11.5 Hgb 10.2 hematocrit 31.3 platelets 512    BMP: Date: 10/25/2024 Na 137 K 4.4 Cr 1.2 BUN 14 glucose 164 Ca 9.5 GFR 74  CBC: Date: 10/25/2024 WBC 13.7 Hgb 10.5 hematocrit 33.8 platelets 295    BMP: Date: 1/23/2025 Na 135 K 4 Cr 1.1 BUN 15 glucose 190 Ca 8.9 GFR 82  CBC: Date: 1/23/2025 WBC 21.1 Hgb 10.4 hematocrit 32.6 platelets 295    BMP: Date: 2/12/2025 Na 138 K 4.2 Cr 1 BUN 17 glucose 101 Ca 9 GFR 91  CBC: Date: 2/12/2025 WBC 11.3 Hgb 10.2 hematocrit 34 platelets 346    BMP: Date: 2/14/2025 Na 138 K 4.5 Cr 1 BUN 18 glucose 135 Ca 9.6 GFR 91  CBC: Date: 2/14/2025 WBC 8.8 Hgb 10.3 hematocrit 33.3 platelets 341              Lab Results   Component Value Date    WBC 17.2 (H) 11/11/2020    HGB 12.8 (L) 11/11/2020    HCT 41.9 11/11/2020     11/11/2020    ALT 12 11/11/2020    AST 14 11/11/2020     11/11/2020    K 3.7 11/11/2020     11/11/2020    CREATININE 0.96 11/11/2020    BUN 14 11/11/2020    CO2 27 11/11/2020    HGBA1C 5.6 11/11/2020             /90   Pulse 70   Temp 36.6 °C (97.8 °F)   Resp 18   Ht 1.763 m (5' 9.4\")   Wt 69.9 kg (154 lb 3.2 oz)   SpO2 99%   BMI 22.51 kg/m²      Physical Exam  Vitals and nursing note reviewed.   Constitutional:       General: He is awake.      Appearance: He is underweight.      Comments: Chronically ill   HENT:      Head: Normocephalic.      Right Ear: External ear normal.      Left Ear: External ear normal.      Nose: Nose normal.      Mouth/Throat:      Mouth: Mucous membranes are moist.      Pharynx: Oropharynx is clear.   Eyes:      Extraocular Movements: Extraocular movements intact.      " Conjunctiva/sclera: Conjunctivae normal.      Pupils: Pupils are equal, round, and reactive to light.   Cardiovascular:      Rate and Rhythm: Normal rate and regular rhythm.      Pulses: Normal pulses.      Heart sounds: Normal heart sounds.   Pulmonary:      Effort: Pulmonary effort is normal.      Breath sounds: Normal breath sounds.   Abdominal:      General: Bowel sounds are normal.      Palpations: Abdomen is soft.   Musculoskeletal:         General: Normal range of motion.      Cervical back: Normal range of motion and neck supple.   Skin:     General: Skin is warm and dry.   Neurological:      Mental Status: He is alert and oriented to person, place, and time. Mental status is at baseline.      Motor: Weakness present.   Psychiatric:         Mood and Affect: Mood normal. Affect is flat.         Behavior: Behavior is slowed. Behavior is not agitated or aggressive. Behavior is cooperative.         Judgment: Judgment is not inappropriate.          Assessment/Plan     Type 2 Diabetes;  noncompliance with diagnostic tests:  Continue Metformin 500 mg PO BID.              Monitor Ha1c.              Encourage compliance with diagnostic tests.                 Major Depressive Disorder; Paranoid Schizophrenia;  unspecified psychosis :   Continue  Fluphenazine.  Continue psychosis or agitation reported.       Problem List Items Addressed This Visit       Chronic paranoid schizophrenia (Multi)    Major depressive disorder    Type 2 diabetes mellitus with hyperosmolarity without coma, without long-term current use of insulin (Multi) - Primary     Other Visit Diagnoses       Noncompliance with diagnostic testing        Psychosis, unspecified psychosis type (Multi)                              GARY Valentine-CNP

## 2025-05-08 ENCOUNTER — NURSING HOME VISIT (OUTPATIENT)
Dept: POST ACUTE CARE | Facility: EXTERNAL LOCATION | Age: 65
End: 2025-05-08
Payer: MEDICAID

## 2025-05-08 DIAGNOSIS — E11.00 TYPE 2 DIABETES MELLITUS WITH HYPEROSMOLARITY WITHOUT COMA, WITHOUT LONG-TERM CURRENT USE OF INSULIN (MULTI): ICD-10-CM

## 2025-05-08 DIAGNOSIS — Z91.199 NONCOMPLIANCE WITH DIAGNOSTIC TESTING: ICD-10-CM

## 2025-05-08 DIAGNOSIS — F33.9 RECURRENT MAJOR DEPRESSIVE DISORDER, REMISSION STATUS UNSPECIFIED: ICD-10-CM

## 2025-05-08 DIAGNOSIS — F29 PSYCHOSIS, UNSPECIFIED PSYCHOSIS TYPE (MULTI): ICD-10-CM

## 2025-05-08 DIAGNOSIS — F20.0 CHRONIC PARANOID SCHIZOPHRENIA (MULTI): Primary | ICD-10-CM

## 2025-05-08 PROCEDURE — 99308 SBSQ NF CARE LOW MDM 20: CPT | Performed by: NURSE PRACTITIONER

## 2025-05-08 NOTE — LETTER
Patient: Carter Jefferson  : 1960    Encounter Date: 2025    Name: Carter Jefferson    YOB: 1960    Code Status: Full Code    Chief Complaint:   Follow up on Paranoid Schizophrenia; etc...    HPI       65 year old male at [facility] in Long Term Care.  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     Diagnoses as follows:    Paranoid Schizophrenia; Major Depressive Disorder; ;  unspecified psychosis :   On Fluphenazine.  No psychosis or agitation reported.   Nursing reports he is  compliant with taking his medications.  His mentation is at baseline.  No recent agitation reported by staff.  Patient generally sleeps a lot and likes to stay in bed.  Patient seen today.   No complaints of chest pain, headaches or dizziness.  No nausea or vomiting.   No abdominal pain.  Calm, cooperative.  Follows commands.       Type 2 Diabetes;  Noncompliance with diagnostic tests:  On Metformin 500 mg PO BID.               Ha1c trends:  21 HA1c 5.6 %   10/12/21 HA1c 5.5 %   5/3/2023: HA1c 5%  2023 Ha1c 5.3 %  2023: Hemoglobin A1c 6.1%  2025: Ha1c 7.3%  Monitoring Ha1c q 3 months  Discussed lab work with patient and nurses.   He typically says he will allow bloodwork to be performed, but when they come to draw the blood he refuses.   No hypoglycemia reported.                                                          Reviewed  EMR  Reviewed medical, social, surgical and family history.  Reviewed all current medications and performed medication reconciliation.  Performed prescription drug management.  Reviewed vital signs AND lab results  Reviewed [facility] Documentation  Discussed patient with nursing.     ROS: 10 point ROS performed; Negative unless noted in HPI.      Surgical History  Problems    · No history of surgery    Family History  Mother    · No pertinent family history    Social History   · Current every day smoker   · Light cigarette smoker    · Lives in long-term care facility    · No illicit drug use   · Quit consuming alcohol in remote past    Allergies  NoKnown    · No Known Allergies      Outside Labs:  CBC: Date: 10/19/21, WBC: 11.6, Hgb: 12, Hct: 38.5, PLT: 310   CBC: Date: 10/12/21, WBC: 13.2, Hgb: 11.9, Hct: 38.2, PLT: 305   BMP: Date: 10/19/21, Na: 140, K: 4.2, Cl: 104, CO2: 26, BUN: 17, Cr: 1.0, Glu: 51, Ca: 9.7   BMP: Date: 10/12/21, Na: 140, K: 4.3, Cl: 103, CO2: 27, BUN: 17, Cr: 1.1, Glu: 80, Ca: 9.7   Hepatic Function Tests: Date: 10/12/21, AST: 12, ALT: 12, Alk Phos: 44, T Bili: 0.3, Albumin: 4.0   Hepatic Function Tests: Date: 10/19/21, AST: 12, ALT: 11, Alk Phos: 40, T Bili: 0.3, Albumin: 3.9   11/18/20 HA1 C 5.6 %    10/12/21 HA1c 5.5 %.     BMP: Date: 5/3/2023 Na 141 K 4.2 Cr 0.9 BUN 17 glucose 49 Ca 9.6   CBC: Date: 5/3/2023 WBC 13.3 Hgb 11.3 hematocrit 36.5 platelets 323  Liver function: Date: 5/3/2023 ALT 7 AST 11 alkaline phos.  47 bilirubin 0.4 albumin 3.8 protein 8.2  Date: 5/3/2023: Lipid panel: Cholesterol 137; triglycerides 65; HDL 31; LDL 93.  Date 5/3/2023: HA1c 5%    BMP: Date: 8/4/2023 Na 140 K 4.1 Cr 1.1 BUN 18 glucose 44 Ca 9.2 GFR 82  CBC: Date: 8/4/2023 WBC 10.6 Hgb 10.5 hematocrit 33.3 platelets 388  Liver function: Date: 8/4/2023 ALT 5 AST 11 alkaline phos.  57 bilirubin 0.3 albumin 3.7 protein 7.9    8/4/2023 hemoglobin A1c 5.3%    8/4/2023  "vitamin D 25-hydroxy level 49.22 WNL    BMP: Date: 11/7/2023 Na 140 K 4.7 Cr 1.1 BUN 18 glucose 96 Ca 9.4 GFR 82  CBC: Date: 11/7/2023 WBC 10.1 Hgb 11 hematocrit 35.4 platelets 278    11/7/2023: Hemoglobin A1c 6.1%    BMP: Date: 4/10/2024 Na 136 K 4.5 Cr 1.1 BUN 13 glucose 171 Ca 9 GFR 82  CBC: Date: 4/10/2024 WBC 17.7 Hgb 10.1 hematocrit 31.7 platelets 510    BMP: Date: 4/12/2024 Na 138 K 4.5 Cr 1.1 BUN 12 glucose 171 Ca 9 GFR 82  CBC: Date: 4/12/2024 WBC 11.5 Hgb 10.2 hematocrit 31.3 platelets 512    BMP: Date: 10/25/2024 Na 137 K 4.4 Cr 1.2 BUN 14 glucose 164 Ca 9.5 GFR 74  CBC: Date: 10/25/2024 WBC 13.7 Hgb 10.5 hematocrit 33.8 platelets 295    BMP: Date: 1/23/2025 Na 135 K 4 Cr 1.1 BUN 15 glucose 190 Ca 8.9 GFR 82  CBC: Date: 1/23/2025 WBC 21.1 Hgb 10.4 hematocrit 32.6 platelets 295    BMP: Date: 2/12/2025 Na 138 K 4.2 Cr 1 BUN 17 glucose 101 Ca 9 GFR 91  CBC: Date: 2/12/2025 WBC 11.3 Hgb 10.2 hematocrit 34 platelets 346    BMP: Date: 2/14/2025 Na 138 K 4.5 Cr 1 BUN 18 glucose 135 Ca 9.6 GFR 91  CBC: Date: 2/14/2025 WBC 8.8 Hgb 10.3 hematocrit 33.3 platelets 341              Lab Results   Component Value Date    WBC 17.2 (H) 11/11/2020    HGB 12.8 (L) 11/11/2020    HCT 41.9 11/11/2020     11/11/2020    ALT 12 11/11/2020    AST 14 11/11/2020     11/11/2020    K 3.7 11/11/2020     11/11/2020    CREATININE 0.96 11/11/2020    BUN 14 11/11/2020    CO2 27 11/11/2020    HGBA1C 5.6 11/11/2020             /72   Pulse 75   Temp 36.6 °C (97.9 °F)   Resp 18   Ht 1.763 m (5' 9.4\")   Wt 68.9 kg (151 lb 12.8 oz)   SpO2 99%   BMI 22.16 kg/m²      Physical Exam  Vitals and nursing note reviewed.   Constitutional:       General: He is awake.      Appearance: He is underweight.      Comments: Chronically ill   HENT:      Head: Normocephalic.      Right Ear: External ear normal.      Left Ear: External ear normal.      Nose: Nose normal.      Mouth/Throat:      Mouth: Mucous membranes are moist.     "  Pharynx: Oropharynx is clear.   Eyes:      Extraocular Movements: Extraocular movements intact.      Conjunctiva/sclera: Conjunctivae normal.      Pupils: Pupils are equal, round, and reactive to light.   Cardiovascular:      Rate and Rhythm: Normal rate and regular rhythm.      Pulses: Normal pulses.      Heart sounds: Normal heart sounds.   Pulmonary:      Effort: Pulmonary effort is normal.      Breath sounds: Normal breath sounds.   Abdominal:      General: Bowel sounds are normal.      Palpations: Abdomen is soft.   Musculoskeletal:         General: Normal range of motion.      Cervical back: Normal range of motion and neck supple.   Skin:     General: Skin is warm and dry.   Neurological:      Mental Status: He is alert and oriented to person, place, and time. Mental status is at baseline.      Motor: Weakness present.   Psychiatric:         Mood and Affect: Mood normal. Affect is flat.         Behavior: Behavior is slowed. Behavior is not agitated or aggressive. Behavior is cooperative.         Judgment: Judgment is not inappropriate.          Assessment/Plan              Paranoid Schizophrenia ;Major Depressive Disorder; unspecified psychosis :   Continue  Fluphenazine.  Continue psychosis or agitation reported.     Type 2 Diabetes;  noncompliance with diagnostic tests:  Continue Metformin 500 mg PO BID.              Monitor Ha1c.              Encourage compliance with diagnostic tests.    Problem List Items Addressed This Visit       Chronic paranoid schizophrenia (Multi) - Primary    Major depressive disorder    Type 2 diabetes mellitus with hyperosmolarity without coma, without long-term current use of insulin (Multi)     Other Visit Diagnoses         Psychosis, unspecified psychosis type (Multi)          Noncompliance with diagnostic testing                    CHON Valentine     Electronically Signed By: CHON Valentine   5/12/25 12:14 AM

## 2025-05-12 VITALS
DIASTOLIC BLOOD PRESSURE: 72 MMHG | WEIGHT: 151.8 LBS | HEART RATE: 75 BPM | SYSTOLIC BLOOD PRESSURE: 131 MMHG | TEMPERATURE: 97.9 F | HEIGHT: 69 IN | OXYGEN SATURATION: 99 % | RESPIRATION RATE: 18 BRPM | BODY MASS INDEX: 22.48 KG/M2

## 2025-05-12 NOTE — PROGRESS NOTES
Name: Carter Jefferson    YOB: 1960    Code Status: Full Code    Chief Complaint:   Follow up on Paranoid Schizophrenia; etc...    HPI       65 year old male at [facility] in Long Term [facility].  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     Diagnoses as follows:    Paranoid Schizophrenia; Major Depressive Disorder; ;  unspecified psychosis :   On Fluphenazine.  No psychosis or agitation reported.   Nursing reports he is  compliant with taking his medications.  His mentation is at baseline.  No recent agitation reported by staff.  Patient generally sleeps a lot and likes to stay in bed.  Patient seen today.   No complaints of chest pain, headaches or dizziness.  No nausea or vomiting.   No abdominal pain.  Calm, cooperative.  Follows commands.       Type 2 Diabetes;  Noncompliance with diagnostic tests:  On Metformin 500 mg PO BID.               Ha1c trends:  11/18/21 HA1c 5.6 %   10/12/21 HA1c 5.5 %   5/3/2023: HA1c 5%  8/4/2023 Ha1c 5.3 %  11/7/2023: Hemoglobin A1c 6.1%  1/23/2025: Ha1c 7.3%  Monitoring Ha1c q 3 months  Discussed lab work with patient and nurses.   He typically says he will allow bloodwork to be performed, but when they come to draw the blood he refuses.   No hypoglycemia reported.                                                         Reviewed  EMR  Reviewed medical, social, surgical and  family history.  Reviewed all current medications and performed medication reconciliation.  Performed prescription drug management.  Reviewed vital signs AND lab results  Reviewed [facility] Care Documentation  Discussed patient with nursing.     ROS: 10 point ROS performed; Negative unless noted in HPI.      Surgical History  Problems    · No history of surgery    Family History  Mother    · No pertinent family history    Social History   · Current every day smoker   · Light cigarette smoker    · Lives in long-term care facility    · No illicit drug use   · Quit consuming alcohol in remote past    Allergies  NoKnown    · No Known Allergies      Outside Labs:  CBC: Date: 10/19/21, WBC: 11.6, Hgb: 12, Hct: 38.5, PLT: 310   CBC: Date: 10/12/21, WBC: 13.2, Hgb: 11.9, Hct: 38.2, PLT: 305   BMP: Date: 10/19/21, Na: 140, K: 4.2, Cl: 104, CO2: 26, BUN: 17, Cr: 1.0, Glu: 51, Ca: 9.7   BMP: Date: 10/12/21, Na: 140, K: 4.3, Cl: 103, CO2: 27, BUN: 17, Cr: 1.1, Glu: 80, Ca: 9.7   Hepatic Function Tests: Date: 10/12/21, AST: 12, ALT: 12, Alk Phos: 44, T Bili: 0.3, Albumin: 4.0   Hepatic Function Tests: Date: 10/19/21, AST: 12, ALT: 11, Alk Phos: 40, T Bili: 0.3, Albumin: 3.9   11/18/20 HA1 C 5.6 %    10/12/21 HA1c 5.5 %.     BMP: Date: 5/3/2023 Na 141 K 4.2 Cr 0.9 BUN 17 glucose 49 Ca 9.6   CBC: Date: 5/3/2023 WBC 13.3 Hgb 11.3 hematocrit 36.5 platelets 323  Liver function: Date: 5/3/2023 ALT 7 AST 11 alkaline phos.  47 bilirubin 0.4 albumin 3.8 protein 8.2  Date: 5/3/2023: Lipid panel: Cholesterol 137; triglycerides 65; HDL 31; LDL 93.  Date 5/3/2023: HA1c 5%    BMP: Date: 8/4/2023 Na 140 K 4.1 Cr 1.1 BUN 18 glucose 44 Ca 9.2 GFR 82  CBC: Date: 8/4/2023 WBC 10.6 Hgb 10.5 hematocrit 33.3 platelets 388  Liver function: Date: 8/4/2023 ALT 5 AST 11 alkaline phos.  57 bilirubin 0.3 albumin 3.7 protein 7.9    8/4/2023 hemoglobin A1c 5.3%    8/4/2023 vitamin D 25-hydroxy level 49.22 WNL    BMP: Date: 11/7/2023 Na 140 K 4.7 Cr  "1.1 BUN 18 glucose 96 Ca 9.4 GFR 82  CBC: Date: 11/7/2023 WBC 10.1 Hgb 11 hematocrit 35.4 platelets 278    11/7/2023: Hemoglobin A1c 6.1%    BMP: Date: 4/10/2024 Na 136 K 4.5 Cr 1.1 BUN 13 glucose 171 Ca 9 GFR 82  CBC: Date: 4/10/2024 WBC 17.7 Hgb 10.1 hematocrit 31.7 platelets 510    BMP: Date: 4/12/2024 Na 138 K 4.5 Cr 1.1 BUN 12 glucose 171 Ca 9 GFR 82  CBC: Date: 4/12/2024 WBC 11.5 Hgb 10.2 hematocrit 31.3 platelets 512    BMP: Date: 10/25/2024 Na 137 K 4.4 Cr 1.2 BUN 14 glucose 164 Ca 9.5 GFR 74  CBC: Date: 10/25/2024 WBC 13.7 Hgb 10.5 hematocrit 33.8 platelets 295    BMP: Date: 1/23/2025 Na 135 K 4 Cr 1.1 BUN 15 glucose 190 Ca 8.9 GFR 82  CBC: Date: 1/23/2025 WBC 21.1 Hgb 10.4 hematocrit 32.6 platelets 295    BMP: Date: 2/12/2025 Na 138 K 4.2 Cr 1 BUN 17 glucose 101 Ca 9 GFR 91  CBC: Date: 2/12/2025 WBC 11.3 Hgb 10.2 hematocrit 34 platelets 346    BMP: Date: 2/14/2025 Na 138 K 4.5 Cr 1 BUN 18 glucose 135 Ca 9.6 GFR 91  CBC: Date: 2/14/2025 WBC 8.8 Hgb 10.3 hematocrit 33.3 platelets 341              Lab Results   Component Value Date    WBC 17.2 (H) 11/11/2020    HGB 12.8 (L) 11/11/2020    HCT 41.9 11/11/2020     11/11/2020    ALT 12 11/11/2020    AST 14 11/11/2020     11/11/2020    K 3.7 11/11/2020     11/11/2020    CREATININE 0.96 11/11/2020    BUN 14 11/11/2020    CO2 27 11/11/2020    HGBA1C 5.6 11/11/2020             /72   Pulse 75   Temp 36.6 °C (97.9 °F)   Resp 18   Ht 1.763 m (5' 9.4\")   Wt 68.9 kg (151 lb 12.8 oz)   SpO2 99%   BMI 22.16 kg/m²      Physical Exam  Vitals and nursing note reviewed.   Constitutional:       General: He is awake.      Appearance: He is underweight.      Comments: Chronically ill   HENT:      Head: Normocephalic.      Right Ear: External ear normal.      Left Ear: External ear normal.      Nose: Nose normal.      Mouth/Throat:      Mouth: Mucous membranes are moist.      Pharynx: Oropharynx is clear.   Eyes:      Extraocular Movements: " Extraocular movements intact.      Conjunctiva/sclera: Conjunctivae normal.      Pupils: Pupils are equal, round, and reactive to light.   Cardiovascular:      Rate and Rhythm: Normal rate and regular rhythm.      Pulses: Normal pulses.      Heart sounds: Normal heart sounds.   Pulmonary:      Effort: Pulmonary effort is normal.      Breath sounds: Normal breath sounds.   Abdominal:      General: Bowel sounds are normal.      Palpations: Abdomen is soft.   Musculoskeletal:         General: Normal range of motion.      Cervical back: Normal range of motion and neck supple.   Skin:     General: Skin is warm and dry.   Neurological:      Mental Status: He is alert and oriented to person, place, and time. Mental status is at baseline.      Motor: Weakness present.   Psychiatric:         Mood and Affect: Mood normal. Affect is flat.         Behavior: Behavior is slowed. Behavior is not agitated or aggressive. Behavior is cooperative.         Judgment: Judgment is not inappropriate.          Assessment/Plan               Paranoid Schizophrenia ;Major Depressive Disorder; unspecified psychosis :   Continue  Fluphenazine.  Continue psychosis or agitation reported.     Type 2 Diabetes;  noncompliance with diagnostic tests:  Continue Metformin 500 mg PO BID.              Monitor Ha1c.              Encourage compliance with diagnostic tests.    Problem List Items Addressed This Visit       Chronic paranoid schizophrenia (Multi) - Primary    Major depressive disorder    Type 2 diabetes mellitus with hyperosmolarity without coma, without long-term current use of insulin (Multi)     Other Visit Diagnoses         Psychosis, unspecified psychosis type (Multi)          Noncompliance with diagnostic testing                    GARY Valentine-CNP

## 2025-05-23 ENCOUNTER — NURSING HOME VISIT (OUTPATIENT)
Dept: POST ACUTE CARE | Facility: EXTERNAL LOCATION | Age: 65
End: 2025-05-23
Payer: MEDICAID

## 2025-05-23 DIAGNOSIS — F33.9 RECURRENT MAJOR DEPRESSIVE DISORDER, REMISSION STATUS UNSPECIFIED: ICD-10-CM

## 2025-05-23 DIAGNOSIS — F41.9 ANXIETY: ICD-10-CM

## 2025-05-23 DIAGNOSIS — E11.00 TYPE 2 DIABETES MELLITUS WITH HYPEROSMOLARITY WITHOUT COMA, WITHOUT LONG-TERM CURRENT USE OF INSULIN (MULTI): ICD-10-CM

## 2025-05-23 DIAGNOSIS — I10 PRIMARY HYPERTENSION: ICD-10-CM

## 2025-05-23 DIAGNOSIS — F20.0 CHRONIC PARANOID SCHIZOPHRENIA (MULTI): Primary | ICD-10-CM

## 2025-05-23 PROCEDURE — 99308 SBSQ NF CARE LOW MDM 20: CPT | Performed by: INTERNAL MEDICINE

## 2025-05-23 NOTE — LETTER
Patient: Carter Jefferson  : 1960    Encounter Date: 2025    Subjective- monthly follow up.  H/O schizophrenia, hypertension, hyperlipidemia, diabetes seen today for a routine visit .He is sitting in chair .Denies any complaints no concerns per staff.  ROS:  General-no fever  Chest-no pain  Respiratory-nocough  Abdomen-no pain or diarrhea  General: no acute distress  Pain:  Vital signs: /72  T 97.9  71 Weight 151.2 lbs  Gen- NAD,alert  Lungs: clear to auscultation B/L  Cardio: S1-S2 normal  ABD: Soft, nontender  Musc/Skel:No deformities  Extremities: No pedaledema  Neuro: No neurological deficits  Psych: Schizophrenia    Assessment/plan-  Hypertension- stable  c/w ASA  Hyperlipidemia-  Diabetes type 2-  c/w Metformin  Schizophrenia-continue with fluphenazine  Follow-up with psych  Weakness- c/w supportive care    Electronically Signed By: Dianne Jewell MD   25  5:06 PM

## 2025-05-31 PROBLEM — J44.9 CHRONIC OBSTRUCTIVE PULMONARY DISEASE, UNSPECIFIED COPD TYPE (MULTI): Status: RESOLVED | Noted: 2024-02-29 | Resolved: 2025-05-31

## 2025-05-31 NOTE — PROGRESS NOTES
Subjective- monthly follow up.  H/O schizophrenia, hypertension, hyperlipidemia, diabetes seen today for a routine visit .He is sitting in chair .Denies any complaints no concerns per staff.  ROS:  General-no fever  Chest-no pain  Respiratory-nocough  Abdomen-no pain or diarrhea  General: no acute distress  Pain:  Vital signs: /72  T 97.9  71 Weight 151.2 lbs  Gen- NAD,alert  Lungs: clear to auscultation B/L  Cardio: S1-S2 normal  ABD: Soft, nontender  Musc/Skel:No deformities  Extremities: No pedaledema  Neuro: No neurological deficits  Psych: Schizophrenia    Assessment/plan-  Hypertension- stable  c/w ASA  Hyperlipidemia-  Diabetes type 2-  c/w Metformin  Schizophrenia-continue with fluphenazine  Follow-up with psych  Weakness- c/w supportive care

## 2025-06-12 ENCOUNTER — NURSING HOME VISIT (OUTPATIENT)
Dept: POST ACUTE CARE | Facility: EXTERNAL LOCATION | Age: 65
End: 2025-06-12
Payer: MEDICAID

## 2025-06-12 DIAGNOSIS — F20.0 CHRONIC PARANOID SCHIZOPHRENIA (MULTI): ICD-10-CM

## 2025-06-12 DIAGNOSIS — E13.42 DIABETIC POLYNEUROPATHY ASSOCIATED WITH OTHER SPECIFIED DIABETES MELLITUS: ICD-10-CM

## 2025-06-12 DIAGNOSIS — F29 PSYCHOSIS, UNSPECIFIED PSYCHOSIS TYPE (MULTI): ICD-10-CM

## 2025-06-12 DIAGNOSIS — D72.829 LEUKOCYTOSIS, UNSPECIFIED TYPE: ICD-10-CM

## 2025-06-12 DIAGNOSIS — Z91.199 NONCOMPLIANCE WITH DIAGNOSTIC TESTING: ICD-10-CM

## 2025-06-12 DIAGNOSIS — E11.9 TYPE 2 DIABETES MELLITUS WITHOUT COMPLICATION, WITHOUT LONG-TERM CURRENT USE OF INSULIN: Primary | ICD-10-CM

## 2025-06-12 DIAGNOSIS — F33.9 RECURRENT MAJOR DEPRESSIVE DISORDER, REMISSION STATUS UNSPECIFIED: ICD-10-CM

## 2025-06-12 NOTE — LETTER
Patient: Carter Jefferson  : 1960    Encounter Date: 2025    Name: Carter Jefferson    YOB: 1960    Code Status: Full Code    Chief Complaint:   Follow up on DM 2; etc...    HPI       65 year old male at [facility] in Long Term Care.  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     Diagnoses as follows:    Type 2 Diabetes;  Noncompliance with diagnostic tests:  On Metformin 500 mg PO BID.  Recently started on Tradjenta 5 mg PO every day.                Ha1c trends:  21 HA1c 5.6 %   10/12/21 HA1c 5.5 %   5/3/2023: HA1c 5%  2023 Ha1c 5.3 %  2023: Hemoglobin A1c 6.1%  2025: Ha1c 7.3%  Monitoring Ha1c q 3 months  Patient recently had bloodwork performed on 25.  No hypoglycemia reported.  Has diabetic snack ordered at bedtime.   Patient seen today.   No complaints of chest pain, headaches or dizziness.  No nausea or vomiting.   No abdominal pain.  Calm, cooperative.  Follows commands.     Diabetic neuropathy:  Not on any medications for neuropathy.   No complaints of neuropathy today.       Major Depressive Disorder; Paranoid Schizophrenia;  unspecified psychosis :   On Fluphenazine.  No psychosis or agitation reported.   Nursing reports he is  compliant with taking his medications.  His mentation is at baseline.  No recent agitation reported by staff.  Patient generally sleeps a  lot and likes to stay in bed.    Leukocytosis:        Previous elevation of WBC.       No related fevers reported.       No cough reported.       WBC trends:  1/23/2025 WBC 21.1   2/12/2025 WBC 11.3   2/14/2025 WBC 8.8   5/27/2025 WBC 10.2   WBC trends improved.                                                        Reviewed  EMR  Reviewed medical, social, surgical and family history.  Reviewed all current medications and performed medication reconciliation.  Performed prescription drug management.  Reviewed vital signs AND lab results  Reviewed [facility] Care Documentation  Discussed patient with nursing.     ROS: 10 point ROS performed; Negative unless noted in HPI.      Surgical History  Problems    · No history of surgery    Family History  Mother    · No pertinent family history    Social History   · Current every day smoker   · Light cigarette smoker    · Lives in long-term care facility    · No illicit drug use   · Quit consuming alcohol in remote past    Allergies  NoKnown    · No Known Allergies      Outside Labs:  CBC: Date: 10/19/21, WBC: 11.6, Hgb: 12, Hct: 38.5, PLT: 310   CBC: Date: 10/12/21, WBC: 13.2, Hgb: 11.9, Hct: 38.2, PLT: 305   BMP: Date: 10/19/21, Na: 140, K: 4.2, Cl: 104, CO2: 26, BUN: 17, Cr: 1.0, Glu: 51, Ca: 9.7   BMP: Date: 10/12/21, Na: 140, K: 4.3, Cl: 103, CO2: 27, BUN: 17, Cr: 1.1, Glu: 80, Ca: 9.7   Hepatic Function Tests: Date: 10/12/21, AST: 12, ALT: 12, Alk Phos: 44, T Bili: 0.3, Albumin: 4.0   Hepatic Function Tests: Date: 10/19/21, AST: 12, ALT: 11, Alk Phos: 40, T Bili: 0.3, Albumin: 3.9   11/18/20 HA1 C 5.6 %    10/12/21 HA1c 5.5 %.     BMP: Date: 5/3/2023 Na 141 K 4.2 Cr 0.9 BUN 17 glucose 49 Ca 9.6   CBC: Date: 5/3/2023 WBC 13.3 Hgb 11.3 hematocrit 36.5 platelets 323  Liver function: Date: 5/3/2023 ALT 7 AST 11 alkaline phos.  47 bilirubin 0.4 albumin 3.8 protein 8.2  Date: 5/3/2023: Lipid panel: Cholesterol 137; triglycerides 65; HDL 31; LDL 93.  Date 5/3/2023:  HA1c 5%    BMP: Date: 8/4/2023 Na 140 K 4.1 Cr 1.1 BUN 18 glucose 44 Ca 9.2 GFR 82  CBC: Date: 8/4/2023 WBC 10.6 Hgb 10.5 hematocrit 33.3 platelets 388  Liver function: Date: 8/4/2023 ALT 5 AST 11 alkaline phos.  57 bilirubin 0.3 albumin 3.7 protein 7.9    8/4/2023 hemoglobin A1c 5.3%    8/4/2023 vitamin D 25-hydroxy level 49.22 WNL    BMP: Date: 11/7/2023 Na 140 K 4.7 Cr 1.1 BUN 18 glucose 96 Ca 9.4 GFR 82  CBC: Date: 11/7/2023 WBC 10.1 Hgb 11 hematocrit 35.4 platelets 278    11/7/2023: Hemoglobin A1c 6.1%    BMP: Date: 4/10/2024 Na 136 K 4.5 Cr 1.1 BUN 13 glucose 171 Ca 9 GFR 82  CBC: Date: 4/10/2024 WBC 17.7 Hgb 10.1 hematocrit 31.7 platelets 510    BMP: Date: 4/12/2024 Na 138 K 4.5 Cr 1.1 BUN 12 glucose 171 Ca 9 GFR 82  CBC: Date: 4/12/2024 WBC 11.5 Hgb 10.2 hematocrit 31.3 platelets 512    BMP: Date: 10/25/2024 Na 137 K 4.4 Cr 1.2 BUN 14 glucose 164 Ca 9.5 GFR 74  CBC: Date: 10/25/2024 WBC 13.7 Hgb 10.5 hematocrit 33.8 platelets 295    BMP: Date: 1/23/2025 Na 135 K 4 Cr 1.1 BUN 15 glucose 190 Ca 8.9 GFR 82  CBC: Date: 1/23/2025 WBC 21.1 Hgb 10.4 hematocrit 32.6 platelets 295    BMP: Date: 2/12/2025 Na 138 K 4.2 Cr 1 BUN 17 glucose 101 Ca 9 GFR 91  CBC: Date: 2/12/2025 WBC 11.3 Hgb 10.2 hematocrit 34 platelets 346    BMP: Date: 2/14/2025 Na 138 K 4.5 Cr 1 BUN 18 glucose 135 Ca 9.6 GFR 91  CBC: Date: 2/14/2025 WBC 8.8 Hgb 10.3 hematocrit 33.3 platelets 341    BMP: Date: 5/27/2025 Na 139 K 4.5 Cr 1 BUN 18 glucose 129 Ca 9.5 GFR 91  CBC: Date: 5/27/2025 WBC 10.2 Hgb 11.4 hematocrit 35.4 platelets 261    5/27/2025 vitamin D 25-hydroxy level 39 ()  5/27/2025 hemoglobin A1c 6.5%  5/27/2025 prostatic specific antigen 0.77 (0-4)            Lab Results   Component Value Date    WBC 17.2 (H) 11/11/2020    HGB 12.8 (L) 11/11/2020    HCT 41.9 11/11/2020     11/11/2020    ALT 12 11/11/2020    AST 14 11/11/2020     11/11/2020    K 3.7 11/11/2020     11/11/2020    CREATININE 0.96 11/11/2020    BUN 14  "11/11/2020    CO2 27 11/11/2020    HGBA1C 5.6 11/11/2020             /82   Pulse 67   Temp 36.3 °C (97.3 °F)   Resp 18   Ht 1.763 m (5' 9.4\")   Wt 68.6 kg (151 lb 3.2 oz)   SpO2 100%   BMI 22.07 kg/m²      Physical Exam  Vitals and nursing note reviewed.   Constitutional:       General: He is awake.      Appearance: He is underweight.      Comments: Chronically ill   HENT:      Head: Normocephalic.      Right Ear: External ear normal.      Left Ear: External ear normal.      Nose: Nose normal.      Mouth/Throat:      Mouth: Mucous membranes are moist.      Pharynx: Oropharynx is clear.   Eyes:      Extraocular Movements: Extraocular movements intact.      Conjunctiva/sclera: Conjunctivae normal.      Pupils: Pupils are equal, round, and reactive to light.   Cardiovascular:      Rate and Rhythm: Normal rate and regular rhythm.      Pulses: Normal pulses.      Heart sounds: Normal heart sounds.   Pulmonary:      Effort: Pulmonary effort is normal.      Breath sounds: Normal breath sounds.   Abdominal:      General: Bowel sounds are normal.      Palpations: Abdomen is soft.   Musculoskeletal:         General: Normal range of motion.      Cervical back: Normal range of motion and neck supple.   Skin:     General: Skin is warm and dry.   Neurological:      Mental Status: He is alert and oriented to person, place, and time. Mental status is at baseline.      Motor: Weakness present.   Psychiatric:         Mood and Affect: Mood normal. Affect is flat.         Behavior: Behavior is slowed. Behavior is not agitated or aggressive. Behavior is cooperative.         Judgment: Judgment is not inappropriate.          Assessment/Plan  Ordered CMP, CBC with diff., Ha1c, lipid panel, vitamin D 25 hydroxy level for 8/12/25.       Type 2 Diabetes;  noncompliance with diagnostic tests:  Continue Metformin 500 mg PO BID.  Continue tradjenta 5 mg Po every day.              Monitor Ha1c Q 3 months.              Encourage " compliance with diagnostic tests.              Monitor bloodwork routinely.               Monitor glucose levels PRN.     Diabetic neuropathy:  Monitor for neuropathy.  Start gabapentin if patient complains of symptoms.            Major Depressive Disorder;  Paranoid Schizophrenia ; unspecified psychosis :   Continue  Fluphenazine.  Continue psychosis or agitation reported.     Leukocytosis:  Improved.   Monitor WBC.  Monitor for s/s of infection.       Problem List Items Addressed This Visit       Chronic paranoid schizophrenia (Multi)    Diabetic neuropathy (Multi)    Major depressive disorder     Other Visit Diagnoses         Type 2 diabetes mellitus without complication, without long-term current use of insulin    -  Primary      Noncompliance with diagnostic testing          Psychosis, unspecified psychosis type (Multi)          Leukocytosis, unspecified type                      CHON Valentine     Electronically Signed By: CHON Valentine   6/14/25  6:39 PM

## 2025-06-14 VITALS
DIASTOLIC BLOOD PRESSURE: 82 MMHG | WEIGHT: 151.2 LBS | RESPIRATION RATE: 18 BRPM | TEMPERATURE: 97.3 F | HEART RATE: 67 BPM | OXYGEN SATURATION: 100 % | HEIGHT: 69 IN | BODY MASS INDEX: 22.39 KG/M2 | SYSTOLIC BLOOD PRESSURE: 130 MMHG

## 2025-06-14 NOTE — PROGRESS NOTES
Name: Carter Jefferson    YOB: 1960    Code Status: Full Code    Chief Complaint:   Follow up on DM 2; etc...    HPI       65 year old male at [facility] in Long Term Care.  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     Diagnoses as follows:    Type 2 Diabetes;  Noncompliance with diagnostic tests:  On Metformin 500 mg PO BID.  Recently started on Tradjenta 5 mg PO every day.                Ha1c trends:  11/18/21 HA1c 5.6 %   10/12/21 HA1c 5.5 %   5/3/2023: HA1c 5%  8/4/2023 Ha1c 5.3 %  11/7/2023: Hemoglobin A1c 6.1%  1/23/2025: Ha1c 7.3%  Monitoring Ha1c q 3 months  Patient recently had bloodwork performed on 5/27/25.  No hypoglycemia reported.  Has diabetic snack ordered at bedtime.   Patient seen today.   No complaints of chest pain, headaches or dizziness.  No nausea or vomiting.   No abdominal pain.  Calm, cooperative.  Follows commands.     Diabetic neuropathy:  Not on any medications for neuropathy.   No complaints of neuropathy today.       Major Depressive Disorder; Paranoid Schizophrenia;  unspecified psychosis :   On Fluphenazine.  No psychosis or agitation reported.   Nursing reports he is  compliant with taking his medications.  His mentation is at baseline.  No recent agitation reported by staff.  Patient generally sleeps a lot and likes to stay in bed.    Leukocytosis:        Previous elevation  of WBC.       No related fevers reported.       No cough reported.       WBC trends:  1/23/2025 WBC 21.1   2/12/2025 WBC 11.3   2/14/2025 WBC 8.8   5/27/2025 WBC 10.2   WBC trends improved.                                                        Reviewed  EMR  Reviewed medical, social, surgical and family history.  Reviewed all current medications and performed medication reconciliation.  Performed prescription drug management.  Reviewed vital signs AND lab results  Reviewed [facility] Documentation  Discussed patient with nursing.     ROS: 10 point ROS performed; Negative unless noted in HPI.      Surgical History  Problems    · No history of surgery    Family History  Mother    · No pertinent family history    Social History   · Current every day smoker   · Light cigarette smoker    · Lives in long-term care facility    · No illicit drug use   · Quit consuming alcohol in remote past    Allergies  NoKnown    · No Known Allergies      Outside Labs:  CBC: Date: 10/19/21, WBC: 11.6, Hgb: 12, Hct: 38.5, PLT: 310   CBC: Date: 10/12/21, WBC: 13.2, Hgb: 11.9, Hct: 38.2, PLT: 305   BMP: Date: 10/19/21, Na: 140, K: 4.2, Cl: 104, CO2: 26, BUN: 17, Cr: 1.0, Glu: 51, Ca: 9.7   BMP: Date: 10/12/21, Na: 140, K: 4.3, Cl: 103, CO2: 27, BUN: 17, Cr: 1.1, Glu: 80, Ca: 9.7   Hepatic Function Tests: Date: 10/12/21, AST: 12, ALT: 12, Alk Phos: 44, T Bili: 0.3, Albumin: 4.0   Hepatic Function Tests: Date: 10/19/21, AST: 12, ALT: 11, Alk Phos: 40, T Bili: 0.3, Albumin: 3.9   11/18/20 HA1 C 5.6 %    10/12/21 HA1c 5.5 %.     BMP: Date: 5/3/2023 Na 141 K 4.2 Cr 0.9 BUN 17 glucose 49 Ca 9.6   CBC: Date: 5/3/2023 WBC 13.3 Hgb 11.3 hematocrit 36.5 platelets 323  Liver function: Date: 5/3/2023 ALT 7 AST 11 alkaline phos.  47 bilirubin 0.4 albumin 3.8 protein 8.2  Date: 5/3/2023: Lipid panel: Cholesterol 137; triglycerides 65; HDL 31; LDL 93.  Date 5/3/2023: HA1c 5%    BMP: Date: 8/4/2023 Na 140 K 4.1 Cr 1.1 BUN 18 glucose 44 Ca  9.2 GFR 82  CBC: Date: 8/4/2023 WBC 10.6 Hgb 10.5 hematocrit 33.3 platelets 388  Liver function: Date: 8/4/2023 ALT 5 AST 11 alkaline phos.  57 bilirubin 0.3 albumin 3.7 protein 7.9    8/4/2023 hemoglobin A1c 5.3%    8/4/2023 vitamin D 25-hydroxy level 49.22 WNL    BMP: Date: 11/7/2023 Na 140 K 4.7 Cr 1.1 BUN 18 glucose 96 Ca 9.4 GFR 82  CBC: Date: 11/7/2023 WBC 10.1 Hgb 11 hematocrit 35.4 platelets 278    11/7/2023: Hemoglobin A1c 6.1%    BMP: Date: 4/10/2024 Na 136 K 4.5 Cr 1.1 BUN 13 glucose 171 Ca 9 GFR 82  CBC: Date: 4/10/2024 WBC 17.7 Hgb 10.1 hematocrit 31.7 platelets 510    BMP: Date: 4/12/2024 Na 138 K 4.5 Cr 1.1 BUN 12 glucose 171 Ca 9 GFR 82  CBC: Date: 4/12/2024 WBC 11.5 Hgb 10.2 hematocrit 31.3 platelets 512    BMP: Date: 10/25/2024 Na 137 K 4.4 Cr 1.2 BUN 14 glucose 164 Ca 9.5 GFR 74  CBC: Date: 10/25/2024 WBC 13.7 Hgb 10.5 hematocrit 33.8 platelets 295    BMP: Date: 1/23/2025 Na 135 K 4 Cr 1.1 BUN 15 glucose 190 Ca 8.9 GFR 82  CBC: Date: 1/23/2025 WBC 21.1 Hgb 10.4 hematocrit 32.6 platelets 295    BMP: Date: 2/12/2025 Na 138 K 4.2 Cr 1 BUN 17 glucose 101 Ca 9 GFR 91  CBC: Date: 2/12/2025 WBC 11.3 Hgb 10.2 hematocrit 34 platelets 346    BMP: Date: 2/14/2025 Na 138 K 4.5 Cr 1 BUN 18 glucose 135 Ca 9.6 GFR 91  CBC: Date: 2/14/2025 WBC 8.8 Hgb 10.3 hematocrit 33.3 platelets 341    BMP: Date: 5/27/2025 Na 139 K 4.5 Cr 1 BUN 18 glucose 129 Ca 9.5 GFR 91  CBC: Date: 5/27/2025 WBC 10.2 Hgb 11.4 hematocrit 35.4 platelets 261    5/27/2025 vitamin D 25-hydroxy level 39 ()  5/27/2025 hemoglobin A1c 6.5%  5/27/2025 prostatic specific antigen 0.77 (0-4)            Lab Results   Component Value Date    WBC 17.2 (H) 11/11/2020    HGB 12.8 (L) 11/11/2020    HCT 41.9 11/11/2020     11/11/2020    ALT 12 11/11/2020    AST 14 11/11/2020     11/11/2020    K 3.7 11/11/2020     11/11/2020    CREATININE 0.96 11/11/2020    BUN 14 11/11/2020    CO2 27 11/11/2020    HGBA1C 5.6 11/11/2020             BP  "130/82   Pulse 67   Temp 36.3 °C (97.3 °F)   Resp 18   Ht 1.763 m (5' 9.4\")   Wt 68.6 kg (151 lb 3.2 oz)   SpO2 100%   BMI 22.07 kg/m²      Physical Exam  Vitals and nursing note reviewed.   Constitutional:       General: He is awake.      Appearance: He is underweight.      Comments: Chronically ill   HENT:      Head: Normocephalic.      Right Ear: External ear normal.      Left Ear: External ear normal.      Nose: Nose normal.      Mouth/Throat:      Mouth: Mucous membranes are moist.      Pharynx: Oropharynx is clear.   Eyes:      Extraocular Movements: Extraocular movements intact.      Conjunctiva/sclera: Conjunctivae normal.      Pupils: Pupils are equal, round, and reactive to light.   Cardiovascular:      Rate and Rhythm: Normal rate and regular rhythm.      Pulses: Normal pulses.      Heart sounds: Normal heart sounds.   Pulmonary:      Effort: Pulmonary effort is normal.      Breath sounds: Normal breath sounds.   Abdominal:      General: Bowel sounds are normal.      Palpations: Abdomen is soft.   Musculoskeletal:         General: Normal range of motion.      Cervical back: Normal range of motion and neck supple.   Skin:     General: Skin is warm and dry.   Neurological:      Mental Status: He is alert and oriented to person, place, and time. Mental status is at baseline.      Motor: Weakness present.   Psychiatric:         Mood and Affect: Mood normal. Affect is flat.         Behavior: Behavior is slowed. Behavior is not agitated or aggressive. Behavior is cooperative.         Judgment: Judgment is not inappropriate.          Assessment/Plan   Ordered CMP, CBC with diff., Ha1c, lipid panel, vitamin D 25 hydroxy level for 8/12/25.       Type 2 Diabetes;  noncompliance with diagnostic tests:  Continue Metformin 500 mg PO BID.  Continue tradjenta 5 mg Po every day.              Monitor Ha1c Q 3 months.              Encourage compliance with diagnostic tests.              Monitor bloodwork routinely. "               Monitor glucose levels PRN.     Diabetic neuropathy:  Monitor for neuropathy.  Start gabapentin if patient complains of symptoms.            Major Depressive Disorder;  Paranoid Schizophrenia ; unspecified psychosis :   Continue  Fluphenazine.  Continue psychosis or agitation reported.     Leukocytosis:  Improved.   Monitor WBC.  Monitor for s/s of infection.       Problem List Items Addressed This Visit       Chronic paranoid schizophrenia (Multi)    Diabetic neuropathy (Multi)    Major depressive disorder     Other Visit Diagnoses         Type 2 diabetes mellitus without complication, without long-term current use of insulin    -  Primary      Noncompliance with diagnostic testing          Psychosis, unspecified psychosis type (Multi)          Leukocytosis, unspecified type                      Bethany oJnes, APRN-CNP

## 2025-07-17 ENCOUNTER — NURSING HOME VISIT (OUTPATIENT)
Dept: POST ACUTE CARE | Facility: EXTERNAL LOCATION | Age: 65
End: 2025-07-17
Payer: MEDICAID

## 2025-07-17 DIAGNOSIS — F20.0 CHRONIC PARANOID SCHIZOPHRENIA (MULTI): ICD-10-CM

## 2025-07-17 DIAGNOSIS — E13.42 DIABETIC POLYNEUROPATHY ASSOCIATED WITH OTHER SPECIFIED DIABETES MELLITUS: ICD-10-CM

## 2025-07-17 DIAGNOSIS — F33.9 RECURRENT MAJOR DEPRESSIVE DISORDER, REMISSION STATUS UNSPECIFIED: Primary | ICD-10-CM

## 2025-07-17 DIAGNOSIS — F29 PSYCHOSIS, UNSPECIFIED PSYCHOSIS TYPE (MULTI): ICD-10-CM

## 2025-07-17 DIAGNOSIS — E11.9 TYPE 2 DIABETES MELLITUS WITHOUT COMPLICATION, WITHOUT LONG-TERM CURRENT USE OF INSULIN: ICD-10-CM

## 2025-07-17 DIAGNOSIS — D72.829 LEUKOCYTOSIS, UNSPECIFIED TYPE: ICD-10-CM

## 2025-07-17 PROCEDURE — 99308 SBSQ NF CARE LOW MDM 20: CPT | Performed by: NURSE PRACTITIONER

## 2025-07-17 NOTE — LETTER
Patient: Carter Jefferson  : 1960    Encounter Date: 2025    Name: Carter Jefferson    YOB: 1960    Code Status: Full Code    Chief Complaint:   Follow up on Major depressive disorder ; etc...    HPI       65 year old male at [facility] in Long Term Care.  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     Diagnoses as follows:    Major Depressive Disorder; Paranoid Schizophrenia;  unspecified psychosis :   On Fluphenazine.  No psychosis or agitation reported.   Nursing reports he is  compliant with taking his medications.  His mentation is at baseline.  No recent agitation reported by staff.  Patient generally sleeps a lot and likes to stay in bed.    Diabetic neuropathy:  Not on any medications for neuropathy.   No complaints of neuropathy today.                        Type 2 Diabetes;  Noncompliance with diagnostic tests:  On Metformin 500 mg PO BID.  On Tradjenta 5 mg PO every day.                Ha1c trends:  21 HA1c 5.6 %   10/12/21 HA1c 5.5 %   5/3/2023: HA1c 5%  2023 Ha1c 5.3 %  2023: Hemoglobin A1c 6.1%  2025: Ha1c 7.3%  Monitoring Ha1c q 3 months  Patient recently had bloodwork performed on 25.  No hypoglycemia reported.  Has diabetic snack ordered at bedtime.         Leukocytosis:        Previous elevation of WBC.       No related fevers reported.       No cough  reported.       WBC trends:  1/23/2025 WBC 21.1   2/12/2025 WBC 11.3   2/14/2025 WBC 8.8   5/27/2025 WBC 10.2   WBC trends improved.                                                        Reviewed  EMR  Reviewed medical, social, surgical and family history.  Reviewed all current medications and performed medication reconciliation.  Performed prescription drug management.  Reviewed vital signs AND lab results  Reviewed [facility] Documentation  Discussed patient with nursing.     ROS: 10 point ROS performed; Negative unless noted in HPI.      Surgical History  Problems    · No history of surgery    Family History  Mother    · No pertinent family history    Social History   · Current every day smoker   · Light cigarette smoker    · Lives in long-term care facility    · No illicit drug use   · Quit consuming alcohol in remote past    Allergies  NoKnown    · No Known Allergies      Outside Labs:  CBC: Date: 10/19/21, WBC: 11.6, Hgb: 12, Hct: 38.5, PLT: 310   CBC: Date: 10/12/21, WBC: 13.2, Hgb: 11.9, Hct: 38.2, PLT: 305   BMP: Date: 10/19/21, Na: 140, K: 4.2, Cl: 104, CO2: 26, BUN: 17, Cr: 1.0, Glu: 51, Ca: 9.7   BMP: Date: 10/12/21, Na: 140, K: 4.3, Cl: 103, CO2: 27, BUN: 17, Cr: 1.1, Glu: 80, Ca: 9.7   Hepatic Function Tests: Date: 10/12/21, AST: 12, ALT: 12, Alk Phos: 44, T Bili: 0.3, Albumin: 4.0   Hepatic Function Tests: Date: 10/19/21, AST: 12, ALT: 11, Alk Phos: 40, T Bili: 0.3, Albumin: 3.9   11/18/20 HA1 C 5.6 %    10/12/21 HA1c 5.5 %.     BMP: Date: 5/3/2023 Na 141 K 4.2 Cr 0.9 BUN 17 glucose 49 Ca 9.6   CBC: Date: 5/3/2023 WBC 13.3 Hgb 11.3 hematocrit 36.5 platelets 323  Liver function: Date: 5/3/2023 ALT 7 AST 11 alkaline phos.  47 bilirubin 0.4 albumin 3.8 protein 8.2  Date: 5/3/2023: Lipid panel: Cholesterol 137; triglycerides 65; HDL 31; LDL 93.  Date 5/3/2023: HA1c 5%    BMP: Date: 8/4/2023 Na 140 K 4.1 Cr 1.1 BUN 18 glucose 44 Ca 9.2 GFR 82  CBC: Date: 8/4/2023 WBC 10.6 Hgb 10.5  hematocrit 33.3 platelets 388  Liver function: Date: 8/4/2023 ALT 5 AST 11 alkaline phos.  57 bilirubin 0.3 albumin 3.7 protein 7.9    8/4/2023 hemoglobin A1c 5.3%    8/4/2023 vitamin D 25-hydroxy level 49.22 WNL    BMP: Date: 11/7/2023 Na 140 K 4.7 Cr 1.1 BUN 18 glucose 96 Ca 9.4 GFR 82  CBC: Date: 11/7/2023 WBC 10.1 Hgb 11 hematocrit 35.4 platelets 278    11/7/2023: Hemoglobin A1c 6.1%    BMP: Date: 4/10/2024 Na 136 K 4.5 Cr 1.1 BUN 13 glucose 171 Ca 9 GFR 82  CBC: Date: 4/10/2024 WBC 17.7 Hgb 10.1 hematocrit 31.7 platelets 510    BMP: Date: 4/12/2024 Na 138 K 4.5 Cr 1.1 BUN 12 glucose 171 Ca 9 GFR 82  CBC: Date: 4/12/2024 WBC 11.5 Hgb 10.2 hematocrit 31.3 platelets 512    BMP: Date: 10/25/2024 Na 137 K 4.4 Cr 1.2 BUN 14 glucose 164 Ca 9.5 GFR 74  CBC: Date: 10/25/2024 WBC 13.7 Hgb 10.5 hematocrit 33.8 platelets 295    BMP: Date: 1/23/2025 Na 135 K 4 Cr 1.1 BUN 15 glucose 190 Ca 8.9 GFR 82  CBC: Date: 1/23/2025 WBC 21.1 Hgb 10.4 hematocrit 32.6 platelets 295    BMP: Date: 2/12/2025 Na 138 K 4.2 Cr 1 BUN 17 glucose 101 Ca 9 GFR 91  CBC: Date: 2/12/2025 WBC 11.3 Hgb 10.2 hematocrit 34 platelets 346    BMP: Date: 2/14/2025 Na 138 K 4.5 Cr 1 BUN 18 glucose 135 Ca 9.6 GFR 91  CBC: Date: 2/14/2025 WBC 8.8 Hgb 10.3 hematocrit 33.3 platelets 341    BMP: Date: 5/27/2025 Na 139 K 4.5 Cr 1 BUN 18 glucose 129 Ca 9.5 GFR 91  CBC: Date: 5/27/2025 WBC 10.2 Hgb 11.4 hematocrit 35.4 platelets 261    5/27/2025 vitamin D 25-hydroxy level 39 ()  5/27/2025 hemoglobin A1c 6.5%  5/27/2025 prostatic specific antigen 0.77 (0-4)            Lab Results   Component Value Date    WBC 17.2 (H) 11/11/2020    HGB 12.8 (L) 11/11/2020    HCT 41.9 11/11/2020     11/11/2020    ALT 12 11/11/2020    AST 14 11/11/2020     11/11/2020    K 3.7 11/11/2020     11/11/2020    CREATININE 0.96 11/11/2020    BUN 14 11/11/2020    CO2 27 11/11/2020    HGBA1C 5.6 11/11/2020             /79   Pulse 69   Temp 36.4 °C (97.5 °F)    "Resp 18   Ht 1.763 m (5' 9.4\")   Wt 71.2 kg (157 lb)   SpO2 98%   BMI 22.92 kg/m²      Physical Exam  Vitals and nursing note reviewed.   Constitutional:       General: He is awake.      Appearance: He is underweight.      Comments: Chronically ill   HENT:      Head: Normocephalic.      Right Ear: External ear normal.      Left Ear: External ear normal.      Nose: Nose normal.      Mouth/Throat:      Mouth: Mucous membranes are moist.      Pharynx: Oropharynx is clear.     Eyes:      Extraocular Movements: Extraocular movements intact.      Conjunctiva/sclera: Conjunctivae normal.      Pupils: Pupils are equal, round, and reactive to light.       Cardiovascular:      Rate and Rhythm: Normal rate and regular rhythm.      Pulses: Normal pulses.      Heart sounds: Normal heart sounds.   Pulmonary:      Effort: Pulmonary effort is normal.      Breath sounds: Normal breath sounds.   Abdominal:      General: Bowel sounds are normal.      Palpations: Abdomen is soft.     Musculoskeletal:         General: Normal range of motion.      Cervical back: Normal range of motion and neck supple.     Skin:     General: Skin is warm and dry.     Neurological:      Mental Status: He is alert and oriented to person, place, and time. Mental status is at baseline.      Motor: Weakness present.     Psychiatric:         Mood and Affect: Mood normal. Affect is flat.         Behavior: Behavior is slowed. Behavior is not agitated or aggressive. Behavior is cooperative.         Judgment: Judgment is inappropriate.          Assessment/Plan  Ordered CMP, CBC with diff., Ha1c, lipid panel, vitamin D 25 hydroxy level for 8/12/25.     Major Depressive Disorder;  Paranoid Schizophrenia ; unspecified psychosis :   Continue  Fluphenazine.  Continue psychosis or agitation reported.       Type 2 Diabetes;  noncompliance with diagnostic tests:  Continue Metformin 500 mg PO BID.  Continue tradjenta 5 mg Po every day.              Monitor Ha1c Q 3 " months.              Encourage compliance with diagnostic tests.              Monitor bloodwork routinely.               Monitor glucose levels PRN.     Diabetic neuropathy:  Monitor for neuropathy.  Start gabapentin if patient complains of symptoms.              Leukocytosis:  Improved.   Monitor WBC.  Monitor for s/s of infection.       Problem List Items Addressed This Visit       Chronic paranoid schizophrenia (Multi)    Diabetic neuropathy (Multi)    Major depressive disorder - Primary     Other Visit Diagnoses         Psychosis, unspecified psychosis type (Multi)          Type 2 diabetes mellitus without complication, without long-term current use of insulin          Leukocytosis, unspecified type                        CHON Valentine     Electronically Signed By: CHON Valentine   7/21/25  1:09 AM

## 2025-07-21 VITALS
RESPIRATION RATE: 18 BRPM | OXYGEN SATURATION: 98 % | BODY MASS INDEX: 23.25 KG/M2 | HEART RATE: 69 BPM | WEIGHT: 157 LBS | DIASTOLIC BLOOD PRESSURE: 79 MMHG | TEMPERATURE: 97.5 F | SYSTOLIC BLOOD PRESSURE: 127 MMHG | HEIGHT: 69 IN

## 2025-07-21 NOTE — PROGRESS NOTES
Name: Carter Jefferson    YOB: 1960    Code Status: Full Code    Chief Complaint:   Follow up on Major depressive disorder ; etc...    HPI       65 year old male at [facility] in [facility].  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     Diagnoses as follows:    Major Depressive Disorder; Paranoid Schizophrenia;  unspecified psychosis :   On Fluphenazine.  No psychosis or agitation reported.   Nursing reports he is  compliant with taking his medications.  His mentation is at baseline.  No recent agitation reported by staff.  Patient generally sleeps a lot and likes to stay in bed.    Diabetic neuropathy:  Not on any medications for neuropathy.   No complaints of neuropathy today.                        Type 2 Diabetes;  Noncompliance with diagnostic tests:  On Metformin 500 mg PO BID.  On Tradjenta 5 mg PO every day.                Ha1c trends:  11/18/21 HA1c 5.6 %   10/12/21 HA1c 5.5 %   5/3/2023: HA1c 5%  8/4/2023 Ha1c 5.3 %  11/7/2023: Hemoglobin A1c 6.1%  1/23/2025: Ha1c 7.3%  Monitoring Ha1c q 3 months  Patient recently had bloodwork performed on 5/27/25.  No hypoglycemia reported.  Has diabetic snack ordered at bedtime.         Leukocytosis:        Previous elevation of WBC.       No related fevers reported.       No cough reported.       WBC trends:  1/23/2025 WBC 21.1   2/12/2025 WBC 11.3    2/14/2025 WBC 8.8   5/27/2025 WBC 10.2   WBC trends improved.                                                        Reviewed  EMR  Reviewed medical, social, surgical and family history.  Reviewed all current medications and performed medication reconciliation.  Performed prescription drug management.  Reviewed vital signs AND lab results  Reviewed [facility] Documentation  Discussed patient with nursing.     ROS: 10 point ROS performed; Negative unless noted in HPI.      Surgical History  Problems    · No history of surgery    Family History  Mother    · No pertinent family history    Social History   · Current every day smoker   · Light cigarette smoker    · Lives in long-term care facility    · No illicit drug use   · Quit consuming alcohol in remote past    Allergies  NoKnown    · No Known Allergies      Outside Labs:  CBC: Date: 10/19/21, WBC: 11.6, Hgb: 12, Hct: 38.5, PLT: 310   CBC: Date: 10/12/21, WBC: 13.2, Hgb: 11.9, Hct: 38.2, PLT: 305   BMP: Date: 10/19/21, Na: 140, K: 4.2, Cl: 104, CO2: 26, BUN: 17, Cr: 1.0, Glu: 51, Ca: 9.7   BMP: Date: 10/12/21, Na: 140, K: 4.3, Cl: 103, CO2: 27, BUN: 17, Cr: 1.1, Glu: 80, Ca: 9.7   Hepatic Function Tests: Date: 10/12/21, AST: 12, ALT: 12, Alk Phos: 44, T Bili: 0.3, Albumin: 4.0   Hepatic Function Tests: Date: 10/19/21, AST: 12, ALT: 11, Alk Phos: 40, T Bili: 0.3, Albumin: 3.9   11/18/20 HA1 C 5.6 %    10/12/21 HA1c 5.5 %.     BMP: Date: 5/3/2023 Na 141 K 4.2 Cr 0.9 BUN 17 glucose 49 Ca 9.6   CBC: Date: 5/3/2023 WBC 13.3 Hgb 11.3 hematocrit 36.5 platelets 323  Liver function: Date: 5/3/2023 ALT 7 AST 11 alkaline phos.  47 bilirubin 0.4 albumin 3.8 protein 8.2  Date: 5/3/2023: Lipid panel: Cholesterol 137; triglycerides 65; HDL 31; LDL 93.  Date 5/3/2023: HA1c 5%    BMP: Date: 8/4/2023 Na 140 K 4.1 Cr 1.1 BUN 18 glucose 44 Ca 9.2 GFR 82  CBC: Date: 8/4/2023 WBC 10.6 Hgb 10.5 hematocrit 33.3 platelets 388  Liver function: Date: 8/4/2023 ALT 5 AST 11  "alkaline phos.  57 bilirubin 0.3 albumin 3.7 protein 7.9    8/4/2023 hemoglobin A1c 5.3%    8/4/2023 vitamin D 25-hydroxy level 49.22 WNL    BMP: Date: 11/7/2023 Na 140 K 4.7 Cr 1.1 BUN 18 glucose 96 Ca 9.4 GFR 82  CBC: Date: 11/7/2023 WBC 10.1 Hgb 11 hematocrit 35.4 platelets 278    11/7/2023: Hemoglobin A1c 6.1%    BMP: Date: 4/10/2024 Na 136 K 4.5 Cr 1.1 BUN 13 glucose 171 Ca 9 GFR 82  CBC: Date: 4/10/2024 WBC 17.7 Hgb 10.1 hematocrit 31.7 platelets 510    BMP: Date: 4/12/2024 Na 138 K 4.5 Cr 1.1 BUN 12 glucose 171 Ca 9 GFR 82  CBC: Date: 4/12/2024 WBC 11.5 Hgb 10.2 hematocrit 31.3 platelets 512    BMP: Date: 10/25/2024 Na 137 K 4.4 Cr 1.2 BUN 14 glucose 164 Ca 9.5 GFR 74  CBC: Date: 10/25/2024 WBC 13.7 Hgb 10.5 hematocrit 33.8 platelets 295    BMP: Date: 1/23/2025 Na 135 K 4 Cr 1.1 BUN 15 glucose 190 Ca 8.9 GFR 82  CBC: Date: 1/23/2025 WBC 21.1 Hgb 10.4 hematocrit 32.6 platelets 295    BMP: Date: 2/12/2025 Na 138 K 4.2 Cr 1 BUN 17 glucose 101 Ca 9 GFR 91  CBC: Date: 2/12/2025 WBC 11.3 Hgb 10.2 hematocrit 34 platelets 346    BMP: Date: 2/14/2025 Na 138 K 4.5 Cr 1 BUN 18 glucose 135 Ca 9.6 GFR 91  CBC: Date: 2/14/2025 WBC 8.8 Hgb 10.3 hematocrit 33.3 platelets 341    BMP: Date: 5/27/2025 Na 139 K 4.5 Cr 1 BUN 18 glucose 129 Ca 9.5 GFR 91  CBC: Date: 5/27/2025 WBC 10.2 Hgb 11.4 hematocrit 35.4 platelets 261    5/27/2025 vitamin D 25-hydroxy level 39 ()  5/27/2025 hemoglobin A1c 6.5%  5/27/2025 prostatic specific antigen 0.77 (0-4)            Lab Results   Component Value Date    WBC 17.2 (H) 11/11/2020    HGB 12.8 (L) 11/11/2020    HCT 41.9 11/11/2020     11/11/2020    ALT 12 11/11/2020    AST 14 11/11/2020     11/11/2020    K 3.7 11/11/2020     11/11/2020    CREATININE 0.96 11/11/2020    BUN 14 11/11/2020    CO2 27 11/11/2020    HGBA1C 5.6 11/11/2020             /79   Pulse 69   Temp 36.4 °C (97.5 °F)   Resp 18   Ht 1.763 m (5' 9.4\")   Wt 71.2 kg (157 lb)   SpO2 98%   BMI " 22.92 kg/m²      Physical Exam  Vitals and nursing note reviewed.   Constitutional:       General: He is awake.      Appearance: He is underweight.      Comments: Chronically ill   HENT:      Head: Normocephalic.      Right Ear: External ear normal.      Left Ear: External ear normal.      Nose: Nose normal.      Mouth/Throat:      Mouth: Mucous membranes are moist.      Pharynx: Oropharynx is clear.     Eyes:      Extraocular Movements: Extraocular movements intact.      Conjunctiva/sclera: Conjunctivae normal.      Pupils: Pupils are equal, round, and reactive to light.       Cardiovascular:      Rate and Rhythm: Normal rate and regular rhythm.      Pulses: Normal pulses.      Heart sounds: Normal heart sounds.   Pulmonary:      Effort: Pulmonary effort is normal.      Breath sounds: Normal breath sounds.   Abdominal:      General: Bowel sounds are normal.      Palpations: Abdomen is soft.     Musculoskeletal:         General: Normal range of motion.      Cervical back: Normal range of motion and neck supple.     Skin:     General: Skin is warm and dry.     Neurological:      Mental Status: He is alert and oriented to person, place, and time. Mental status is at baseline.      Motor: Weakness present.     Psychiatric:         Mood and Affect: Mood normal. Affect is flat.         Behavior: Behavior is slowed. Behavior is not agitated or aggressive. Behavior is cooperative.         Judgment: Judgment is inappropriate.          Assessment/Plan   Ordered CMP, CBC with diff., Ha1c, lipid panel, vitamin D 25 hydroxy level for 8/12/25.     Major Depressive Disorder;  Paranoid Schizophrenia ; unspecified psychosis :   Continue  Fluphenazine.  Continue psychosis or agitation reported.       Type 2 Diabetes;  noncompliance with diagnostic tests:  Continue Metformin 500 mg PO BID.  Continue tradjenta 5 mg Po every day.              Monitor Ha1c Q 3 months.              Encourage compliance with diagnostic tests.               Monitor bloodwork routinely.               Monitor glucose levels PRN.     Diabetic neuropathy:  Monitor for neuropathy.  Start gabapentin if patient complains of symptoms.              Leukocytosis:  Improved.   Monitor WBC.  Monitor for s/s of infection.       Problem List Items Addressed This Visit       Chronic paranoid schizophrenia (Multi)    Diabetic neuropathy (Multi)    Major depressive disorder - Primary     Other Visit Diagnoses         Psychosis, unspecified psychosis type (Multi)          Type 2 diabetes mellitus without complication, without long-term current use of insulin          Leukocytosis, unspecified type                        Bethany Jones, APRN-CNP

## 2025-08-06 ENCOUNTER — NURSING HOME VISIT (OUTPATIENT)
Dept: POST ACUTE CARE | Facility: EXTERNAL LOCATION | Age: 65
End: 2025-08-06
Payer: MEDICAID

## 2025-08-06 DIAGNOSIS — E11.9 TYPE 2 DIABETES MELLITUS WITHOUT COMPLICATION, WITHOUT LONG-TERM CURRENT USE OF INSULIN: ICD-10-CM

## 2025-08-06 DIAGNOSIS — I10 PRIMARY HYPERTENSION: ICD-10-CM

## 2025-08-06 DIAGNOSIS — F20.0 CHRONIC PARANOID SCHIZOPHRENIA (MULTI): Primary | ICD-10-CM

## 2025-08-06 DIAGNOSIS — F33.9 RECURRENT MAJOR DEPRESSIVE DISORDER, REMISSION STATUS UNSPECIFIED: ICD-10-CM

## 2025-08-06 NOTE — LETTER
Patient: Carter Jefferson  : 1960    Encounter Date: 2025    Subjective- monthly follow up.  H/O schizophrenia, hypertension, hyperlipidemia, diabetes seen today for a routine visit .He is lying in his room .Denies any complaints no concerns per staff.  ROS:  General-no fever  Chest-no pain  Respiratory-nocough  Abdomen-no pain or diarrhea  General: no acute distress  Pain:  Vital signs: /78  T 97.6  71 Weight 158 lbs  Gen- NAD,alert  Lungs: clear to auscultation B/L  Cardio: S1-S2 normal  ABD: Soft, nontender  Musc/Skel:No deformities  Extremities: No pedaledema  Neuro: No neurological deficits  Psych: Schizophrenia  Assessment/plan-  Hypertension- stable  c/w ASA  Hyperlipidemia-  Diabetes type 2- Hba1c 6.5  c/w Metformin, tradjenta  Schizophrenia-continue with fluphenazine  Follow-up with psych  Weakness- Pt/Ot eval    Electronically Signed By: Dianne Jewell MD   25  4:36 PM  
[FreeTextEntry3] : Medical record entries made by the scribe today, were at my direction and personally dictated to them by me, Dr. Juan Malagon on 02/14/2023. I have reviewed the chart and agree that the record accurately reflects my personal performance of the history, physical exam, assessment, and plan.\par

## 2025-08-08 NOTE — PROGRESS NOTES
Subjective- monthly follow up.  H/O schizophrenia, hypertension, hyperlipidemia, diabetes seen today for a routine visit .He is lying in his room .Denies any complaints no concerns per staff.  ROS:  General-no fever  Chest-no pain  Respiratory-nocough  Abdomen-no pain or diarrhea  General: no acute distress  Pain:  Vital signs: /78  T 97.6  71 Weight 158 lbs  Gen- NAD,alert  Lungs: clear to auscultation B/L  Cardio: S1-S2 normal  ABD: Soft, nontender  Musc/Skel:No deformities  Extremities: No pedaledema  Neuro: No neurological deficits  Psych: Schizophrenia  Assessment/plan-  Hypertension- stable  c/w ASA  Hyperlipidemia-  Diabetes type 2- Hba1c 6.5  c/w Metformin, tradjenta  Schizophrenia-continue with fluphenazine  Follow-up with psych  Weakness- Pt/Ot eval

## 2025-08-12 ENCOUNTER — NURSING HOME VISIT (OUTPATIENT)
Dept: POST ACUTE CARE | Facility: EXTERNAL LOCATION | Age: 65
End: 2025-08-12
Payer: MEDICAID

## 2025-08-12 DIAGNOSIS — F20.0 CHRONIC PARANOID SCHIZOPHRENIA (MULTI): ICD-10-CM

## 2025-08-12 DIAGNOSIS — F33.9 RECURRENT MAJOR DEPRESSIVE DISORDER, REMISSION STATUS UNSPECIFIED: ICD-10-CM

## 2025-08-12 DIAGNOSIS — F29 PSYCHOSIS, UNSPECIFIED PSYCHOSIS TYPE (MULTI): ICD-10-CM

## 2025-08-12 DIAGNOSIS — E11.9 TYPE 2 DIABETES MELLITUS WITHOUT COMPLICATION, WITHOUT LONG-TERM CURRENT USE OF INSULIN: Primary | ICD-10-CM

## 2025-08-12 DIAGNOSIS — Z91.199 NONCOMPLIANCE WITH DIAGNOSTIC TESTING: ICD-10-CM

## 2025-08-12 PROCEDURE — 99308 SBSQ NF CARE LOW MDM 20: CPT | Performed by: NURSE PRACTITIONER

## 2025-08-16 VITALS
DIASTOLIC BLOOD PRESSURE: 78 MMHG | TEMPERATURE: 97.6 F | WEIGHT: 158 LBS | BODY MASS INDEX: 23.4 KG/M2 | RESPIRATION RATE: 18 BRPM | OXYGEN SATURATION: 100 % | HEIGHT: 69 IN | HEART RATE: 76 BPM | SYSTOLIC BLOOD PRESSURE: 108 MMHG